# Patient Record
Sex: FEMALE | Race: BLACK OR AFRICAN AMERICAN | NOT HISPANIC OR LATINO | Employment: UNEMPLOYED | URBAN - METROPOLITAN AREA
[De-identification: names, ages, dates, MRNs, and addresses within clinical notes are randomized per-mention and may not be internally consistent; named-entity substitution may affect disease eponyms.]

---

## 2017-07-27 ENCOUNTER — OFFICE VISIT (OUTPATIENT)
Dept: OBSTETRICS AND GYNECOLOGY | Facility: CLINIC | Age: 27
End: 2017-07-27
Attending: OBSTETRICS & GYNECOLOGY
Payer: MEDICAID

## 2017-07-27 VITALS
BODY MASS INDEX: 24.52 KG/M2 | WEIGHT: 152.56 LBS | HEIGHT: 66 IN | DIASTOLIC BLOOD PRESSURE: 64 MMHG | SYSTOLIC BLOOD PRESSURE: 100 MMHG

## 2017-07-27 DIAGNOSIS — Z30.46 ENCOUNTER FOR SURVEILLANCE OF IMPLANTABLE SUBDERMAL CONTRACEPTIVE: ICD-10-CM

## 2017-07-27 DIAGNOSIS — Z01.411 ENCOUNTER FOR GYNECOLOGICAL EXAMINATION WITH ABNORMAL FINDING: Primary | ICD-10-CM

## 2017-07-27 DIAGNOSIS — Z30.9 ENCOUNTER FOR CONTRACEPTIVE MANAGEMENT, UNSPECIFIED TYPE: ICD-10-CM

## 2017-07-27 DIAGNOSIS — Z11.3 SCREEN FOR STD (SEXUALLY TRANSMITTED DISEASE): ICD-10-CM

## 2017-07-27 DIAGNOSIS — Z01.419 PAP SMEAR, AS PART OF ROUTINE GYNECOLOGICAL EXAMINATION: ICD-10-CM

## 2017-07-27 PROCEDURE — 99385 PREV VISIT NEW AGE 18-39: CPT | Mod: S$PBB,,, | Performed by: OBSTETRICS & GYNECOLOGY

## 2017-07-27 PROCEDURE — 88175 CYTOPATH C/V AUTO FLUID REDO: CPT

## 2017-07-27 PROCEDURE — 99999 PR PBB SHADOW E&M-EST. PATIENT-LVL III: CPT | Mod: PBBFAC,,, | Performed by: OBSTETRICS & GYNECOLOGY

## 2017-07-27 PROCEDURE — 87591 N.GONORRHOEAE DNA AMP PROB: CPT

## 2017-07-27 PROCEDURE — 87660 TRICHOMONAS VAGIN DIR PROBE: CPT

## 2017-07-27 PROCEDURE — 87480 CANDIDA DNA DIR PROBE: CPT

## 2017-07-27 PROCEDURE — 99213 OFFICE O/P EST LOW 20 MIN: CPT | Mod: PBBFAC | Performed by: OBSTETRICS & GYNECOLOGY

## 2017-07-27 NOTE — PROGRESS NOTES
SUBJECTIVE:   27 y.o. female   for annual routine Pap and checkup. Patient's last menstrual period was 2017..  She wants STD testing and interested in getting her nexplanon removed.  It has been in for 3 years. .        Past Medical History:   Diagnosis Date    Allergic      History reviewed. No pertinent surgical history.  Social History     Social History    Marital status: Single     Spouse name: N/A    Number of children: N/A    Years of education: N/A     Occupational History    Not on file.     Social History Main Topics    Smoking status: Never Smoker    Smokeless tobacco: Not on file    Alcohol use Yes      Comment: rarely    Drug use: No    Sexual activity: Yes     Other Topics Concern    Not on file     Social History Narrative    No narrative on file     Family History   Problem Relation Age of Onset    Breast cancer Neg Hx     Colon cancer Neg Hx     Ovarian cancer Neg Hx      OB History    Para Term  AB Living   0 0 0 0 0 0   SAB TAB Ectopic Multiple Live Births   0 0 0 0 0               No current outpatient prescriptions on file.     No current facility-administered medications for this visit.      Allergies: Review of patient's allergies indicates no known allergies.     ROS:  Constitutional: no weight loss, weight gain, fever, fatigue  Eyes:  No vision changes, glasses/contacts  ENT/Mouth: No ulcers, sinus problems, ears ringing, headache  Cardiovascular: No inability to lie flat, chest pain, exercise intolerance, swelling, heart palpitations  Respiratory: No wheezing, coughing blood, shortness of breath, or cough  Gastrointestinal: No diarrhea, bloody stool, nausea/vomiting, constipation, gas, hemorrhoids  Genitourinary: No blood in urine, painful urination, urgency of urination, frequency of urination, incomplete emptying, incontinence, abnormal bleeding, painful periods, heavy periods, vaginal discharge, vaginal odor, painful intercourse, sexual problems,  "bleeding after intercourse.  Musculoskeletal: No muscle weakness  Skin/Breast: No painful breasts, nipple discharge, masses, rash, ulcers  Neurological: No passing out, seizures, numbness, headache  Endocrine: No diabetes, hypothyroid, hyperthyroid, hot flashes, hair loss, abnormal hair growth, ance  Psychiatric: No depression, crying  Hematologic: No bruises, bleeding, swollen lymph nodes, anemia.      OBJECTIVE:   The patient appears well, alert, oriented x 3, in no distress.  /64   Ht 5' 6" (1.676 m)   Wt 69.2 kg (152 lb 8.9 oz)   LMP 07/21/2017   BMI 24.62 kg/m²   NECK: no thyromegaly, trachea midline  SKIN: no acne, striae, hirsutism  BREAST EXAM: breasts appear normal, no suspicious masses, no skin or nipple changes or axillary nodes  ABDOMEN: no hernias, masses, or hepatosplenomegaly  GENITALIA: normal external genitalia, no erythema, no discharge  URETHRA: normal appearing urethra with no masses, tenderness or lesions and normal urethra, normal urethral meatus  VAGINA: Normal  CERVIX: no lesions or cervical motion tenderness  UTERUS: normal size, contour, position, consistency, mobility, non-tender  ADNEXA: no mass, fullness, tenderness    \  ASSESSMENT:   Harish was seen today for well woman and contraception.    Diagnoses and all orders for this visit:    Encounter for gynecological examination with abnormal finding  -     Liquid-based pap smear, screening    Encounter for contraceptive management, unspecified type  -     POCT urine pregnancy    Pap smear, as part of routine gynecological examination  -     Liquid-based pap smear, screening    Encounter for surveillance of implantable subdermal contraceptive  Will schedule removal.  Screen for STD (sexually transmitted disease)  -     C. trachomatis/N. gonorrhoeae by AMP DNA Cervix  -     Vaginosis Screen by DNA Probe        "

## 2017-07-28 LAB
CANDIDA RRNA VAG QL PROBE: NEGATIVE
G VAGINALIS RRNA GENITAL QL PROBE: POSITIVE
T VAGINALIS RRNA GENITAL QL PROBE: NEGATIVE

## 2017-07-30 LAB
C TRACH DNA SPEC QL NAA+PROBE: NOT DETECTED
N GONORRHOEA DNA SPEC QL NAA+PROBE: NOT DETECTED

## 2017-07-31 ENCOUNTER — TELEPHONE (OUTPATIENT)
Dept: OBSTETRICS AND GYNECOLOGY | Facility: HOSPITAL | Age: 27
End: 2017-07-31

## 2017-07-31 DIAGNOSIS — N76.0 BV (BACTERIAL VAGINOSIS): Primary | ICD-10-CM

## 2017-07-31 DIAGNOSIS — B96.89 BV (BACTERIAL VAGINOSIS): Primary | ICD-10-CM

## 2017-07-31 RX ORDER — METRONIDAZOLE 500 MG/1
500 TABLET ORAL EVERY 12 HOURS
Qty: 14 TABLET | Refills: 0 | Status: SHIPPED | OUTPATIENT
Start: 2017-07-31 | End: 2017-08-07

## 2017-07-31 NOTE — TELEPHONE ENCOUNTER
Note abnormal affirm.  Positive for bacterial vaginosis.  Rx sent to pharmacy on file.    Please call pt to notify her of the results.

## 2017-08-03 ENCOUNTER — TELEPHONE (OUTPATIENT)
Dept: OBSTETRICS AND GYNECOLOGY | Facility: CLINIC | Age: 27
End: 2017-08-03

## 2017-08-03 NOTE — TELEPHONE ENCOUNTER
----- Message from Ct Tyler sent at 8/3/2017  8:46 AM CDT -----  Contact: self  Patient is wanting to reschedule her nexplanon removal, patient has medicaid, please advise on this request. Thank you! Patient can be reached at [phone]

## 2017-08-03 NOTE — TELEPHONE ENCOUNTER
Patient was rescheduled for nexplanon removal for Tues 8/15 at the [facility].

## 2017-08-15 ENCOUNTER — PROCEDURE VISIT (OUTPATIENT)
Dept: OBSTETRICS AND GYNECOLOGY | Facility: CLINIC | Age: 27
End: 2017-08-15
Attending: OBSTETRICS & GYNECOLOGY
Payer: MEDICAID

## 2017-08-15 VITALS
WEIGHT: 153.88 LBS | DIASTOLIC BLOOD PRESSURE: 60 MMHG | BODY MASS INDEX: 24.73 KG/M2 | SYSTOLIC BLOOD PRESSURE: 100 MMHG | HEIGHT: 66 IN

## 2017-08-15 DIAGNOSIS — Z30.46 ENCOUNTER FOR NEXPLANON REMOVAL: Primary | ICD-10-CM

## 2017-08-15 PROCEDURE — 11982 REMOVE DRUG IMPLANT DEVICE: CPT | Mod: PBBFAC,PO | Performed by: OBSTETRICS & GYNECOLOGY

## 2017-08-15 NOTE — PROCEDURES
Removal of Nexplanon Device  Date/Time: 8/15/2017 9:23 AM  Performed by: HANNAH DEL CID  Authorized by: HANNAH DEL CID   Local anesthesia used: yes  Anesthesia: local infiltration    Anesthesia:  Local anesthesia used: yes  Local Anesthetic: lidocaine 1% without epinephrine  Patient tolerance: Patient tolerated the procedure well with no immediate complications      Ken Bills is a 27 y.o. female  presents for Nexplanon removal.    UPT negative.    Nexplanon palpated through the skin.  Area wiped with rubbing alcohol.  1 cc of 1% lidocaine without epinephrine was injected in the subcutaneous tissue.  The area was prepped with betadine.  A stabbing incision was made with an 11 blade scalpel.  The Nexplanon was identified and grasped with curved hemostat.  It was removed intact.  A bandage was placed over the incision site. Pt tolerated the procedure well.       Hannah Del Cid MD  PGY-4 OB/GYN  [phone]

## 2018-01-14 ENCOUNTER — HOSPITAL ENCOUNTER (EMERGENCY)
Facility: OTHER | Age: 28
Discharge: HOME | End: 2018-01-14
Attending: EMERGENCY MEDICINE
Payer: MEDICAID

## 2018-01-14 VITALS
RESPIRATION RATE: 18 BRPM | BODY MASS INDEX: 25.71 KG/M2 | SYSTOLIC BLOOD PRESSURE: 117 MMHG | HEART RATE: 76 BPM | HEIGHT: 66 IN | WEIGHT: 160 LBS | OXYGEN SATURATION: 99 % | TEMPERATURE: 99 F | DIASTOLIC BLOOD PRESSURE: 59 MMHG

## 2018-01-14 DIAGNOSIS — Z34.90 PREGNANCY AT EARLY STAGE: Primary | ICD-10-CM

## 2018-01-14 LAB
B-HCG UR QL: POSITIVE
BILIRUB UR QL STRIP: NEGATIVE
CLARITY UR: CLEAR
COLOR UR: YELLOW
CTP QC/QA: YES
GLUCOSE UR QL STRIP: NEGATIVE
HGB UR QL STRIP: NEGATIVE
KETONES UR QL STRIP: NEGATIVE
LEUKOCYTE ESTERASE UR QL STRIP: NEGATIVE
NITRITE UR QL STRIP: NEGATIVE
PH UR STRIP: 6 [PH] (ref 5–8)
PROT UR QL STRIP: NEGATIVE
SP GR UR STRIP: 1.02 (ref 1–1.03)
URN SPEC COLLECT METH UR: NORMAL
UROBILINOGEN UR STRIP-ACNC: 1 EU/DL

## 2018-01-14 PROCEDURE — 81025 URINE PREGNANCY TEST: CPT | Performed by: EMERGENCY MEDICINE

## 2018-01-14 PROCEDURE — 81003 URINALYSIS AUTO W/O SCOPE: CPT

## 2018-01-14 PROCEDURE — 99283 EMERGENCY DEPT VISIT LOW MDM: CPT | Mod: 25

## 2018-01-14 PROCEDURE — 25000003 PHARM REV CODE 250: Performed by: PHYSICIAN ASSISTANT

## 2018-01-14 RX ORDER — ACETAMINOPHEN 500 MG
1000 TABLET ORAL
Status: COMPLETED | OUTPATIENT
Start: 2018-01-14 | End: 2018-01-14

## 2018-01-14 RX ADMIN — ACETAMINOPHEN 1000 MG: 500 TABLET ORAL at 06:01

## 2018-01-15 NOTE — ED NOTES
Appearance: Pt awake, alert & oriented to person, place & time. Pt in no acute distress at present time. Pt is clean and well groomed with clothes appropriately fastened.   Skin: Skin warm, dry & intact. Color consistent with ethnicity. Mucous membranes moist. No breakdown or brusing noted.   Musculoskeletal: Patient moving all extremities well, no obvious swelling or deformities noted.   Respiratory: Respirations spontaneous, even, and non-labored. Visible chest rise noted. Airway is open and patent. No accessory muscle use noted.   Neurologic: Sensation is intact. Speech is clear and appropriate. Eyes open spontaneously, behavior appropriate to situation, follows commands,  Cardiac: Pt denies active chest pains, SOB, dizziness, blurred vision, weakness or fatigue at this time.   Abdomen: Abdomen soft, non distended. SEE HPI.   : Pt reports no dysuria or hematuria.

## 2018-01-15 NOTE — ED PROVIDER NOTES
Encounter Date: 2018       History     Chief Complaint   Patient presents with    Possible Pregnancy     took a home pregnancy test and it showed positive and she is looking for confirmation and states that for the last few days her stomach has been hurting her     Patient is a 27-year-old  female with no significant past medical history who presents to the ED with possible pregnancy.  Patient states the past week she has had mild, intermittent upper abdominal cramping for the past week.  She denies vaginal bleeding or vaginal discharge.  She denies nausea or vomiting.  She denies dysuria or hematuria.  She states she took a pregnancy test at home today and was positive.  She states she wants us to confirm that she is pregnant.       The history is provided by the patient.     Review of patient's allergies indicates:  No Known Allergies  Past Medical History:   Diagnosis Date    Allergic      Past Surgical History:   Procedure Laterality Date    NO PAST SURGERIES       Family History   Problem Relation Age of Onset    Breast cancer Neg Hx     Colon cancer Neg Hx     Ovarian cancer Neg Hx      Social History   Substance Use Topics    Smoking status: Never Smoker    Smokeless tobacco: Never Used    Alcohol use Yes      Comment: rarely     Review of Systems   Constitutional: Negative for chills and fever.   HENT: Negative for congestion and sore throat.    Eyes: Negative for pain.   Respiratory: Negative for shortness of breath.    Cardiovascular: Negative for chest pain.   Gastrointestinal: Positive for abdominal pain. Negative for diarrhea, nausea and vomiting.   Genitourinary: Negative for dysuria, hematuria, pelvic pain, vaginal bleeding and vaginal discharge.   Musculoskeletal: Negative for arthralgias.   Skin: Negative for rash.   Neurological: Negative for headaches.       Physical Exam     Initial Vitals [18 1618]   BP Pulse Resp Temp SpO2   (!) 115/55 75 16 98.3 °F (36.8 °C) 100 %       MAP       75         Physical Exam    Constitutional: Vital signs are normal. She is cooperative.   -American female in no acute distress.  She is nontoxic-appearing.   HENT:   Head: Normocephalic and atraumatic.   Mouth/Throat: Oropharynx is clear and moist.   Eyes: Conjunctivae and EOM are normal. Pupils are equal, round, and reactive to light.   Neck: Normal range of motion. Neck supple.   Cardiovascular: Normal rate, regular rhythm and intact distal pulses.   No murmur heard.  Pulmonary/Chest: Breath sounds normal. She has no wheezes. She has no rhonchi. She has no rales.   Abdominal: Soft. Bowel sounds are normal. There is no tenderness. There is no rebound and no guarding.   Musculoskeletal: Normal range of motion. She exhibits no edema.   Neurological: She is alert and oriented to person, place, and time. She has normal strength. GCS eye subscore is 4. GCS verbal subscore is 5. GCS motor subscore is 6.   Skin: Skin is warm and dry. Capillary refill takes less than 2 seconds. No rash noted.   Psychiatric: Her behavior is normal.         ED Course   Procedures  Labs Reviewed   POCT URINE PREGNANCY - Abnormal; Notable for the following:        Result Value    POC Preg Test, Ur Positive (*)     All other components within normal limits   URINALYSIS             Medical Decision Making:   Initial Assessment:   Urgent evaluation of a 27 y.o. female presenting to the emergency department complaining of possible pregnancy. Patient is afebrile, nontoxic appearing and hemodynamically stable.  ED Management:  Patient with positive home UPT.  UPT is positive here.  Exam is unremarkable.  No signs of acute abdomen.  Urinalysis reveals no UTI.  No vaginal bleeding.  I do not believe further workup is indicated at this time.  Patient will be sent home with prenatal vitamins and given referral to [facility] clinic.  I have given her specific return precautions for new or worsening symptoms. I have discussed the  patient with the attending thoroughly and he/she agrees to the treatment and plan.   Other:   I have discussed this case with another health care provider.  This note was created using Dragon Medical Dictation. There may be typographical errors secondary to dictation.                    ED Course      Clinical Impression:     1. Pregnancy at early stage                             Gino Tadeo PA-C  01/14/18 1928

## 2018-01-15 NOTE — ED NOTES
Pt presenting to ED with reporting she had positive home pregnancy today, wanted to come to ED confirm her pregnancy. Pt with generalized abdominal pain. Denies nausea, vomiting, fever or chills. Reports her last LMP was on December 13th of 2017. No other complaints. Pt AAOx4 and appropriate at this time. Respirations even and unlabored. No acute distress noted.

## 2018-01-22 ENCOUNTER — OFFICE VISIT (OUTPATIENT)
Dept: OBSTETRICS AND GYNECOLOGY | Facility: CLINIC | Age: 28
End: 2018-01-22
Payer: MEDICAID

## 2018-01-22 VITALS
SYSTOLIC BLOOD PRESSURE: 110 MMHG | DIASTOLIC BLOOD PRESSURE: 66 MMHG | HEIGHT: 66 IN | WEIGHT: 150.81 LBS | BODY MASS INDEX: 24.24 KG/M2

## 2018-01-22 DIAGNOSIS — Z32.01 POSITIVE PREGNANCY TEST: Primary | ICD-10-CM

## 2018-01-22 DIAGNOSIS — N91.2 AMENORRHEA: ICD-10-CM

## 2018-01-22 LAB
B-HCG UR QL: POSITIVE
C TRACH DNA SPEC QL NAA+PROBE: NOT DETECTED
CTP QC/QA: YES
N GONORRHOEA DNA SPEC QL NAA+PROBE: NOT DETECTED

## 2018-01-22 PROCEDURE — 99213 OFFICE O/P EST LOW 20 MIN: CPT | Mod: TH,S$PBB,, | Performed by: STUDENT IN AN ORGANIZED HEALTH CARE EDUCATION/TRAINING PROGRAM

## 2018-01-22 PROCEDURE — 99999 PR PBB SHADOW E&M-EST. PATIENT-LVL III: CPT | Mod: PBBFAC,,, | Performed by: STUDENT IN AN ORGANIZED HEALTH CARE EDUCATION/TRAINING PROGRAM

## 2018-01-22 PROCEDURE — 87086 URINE CULTURE/COLONY COUNT: CPT

## 2018-01-22 PROCEDURE — 81025 URINE PREGNANCY TEST: CPT | Mod: PBBFAC,PO | Performed by: STUDENT IN AN ORGANIZED HEALTH CARE EDUCATION/TRAINING PROGRAM

## 2018-01-22 PROCEDURE — 99213 OFFICE O/P EST LOW 20 MIN: CPT | Mod: PBBFAC,TH,PO | Performed by: STUDENT IN AN ORGANIZED HEALTH CARE EDUCATION/TRAINING PROGRAM

## 2018-01-22 PROCEDURE — 87491 CHLMYD TRACH DNA AMP PROBE: CPT

## 2018-01-22 NOTE — PROGRESS NOTES
"CC: Absence of menses    Ken Bills is a 27 y.o. female  presents with complaint of absence of menstruation.  UPT is positive.   She complains of multiple symptoms including abdominal pain (patient cannot characterize but says it is not cramping). She also has nausea, vomiting (x 1 two days ago, has not had since), headaches, and dizziness. She has not fallen and denies syncope or seizures. She denies fevers, chills, or diarrhea. She denies vaginal bleeding.    Past Medical History:   Diagnosis Date    Allergic      Past Surgical History:   Procedure Laterality Date    NO PAST SURGERIES       Social History     Social History    Marital status: Single     Spouse name: N/A    Number of children: N/A    Years of education: N/A     Social History Main Topics    Smoking status: Never Smoker    Smokeless tobacco: Never Used    Alcohol use No      Comment: rarely    Drug use: No    Sexual activity: Yes     Partners: Male     Birth control/ protection: None     Other Topics Concern    None     Social History Narrative    None     Family History   Problem Relation Age of Onset    Breast cancer Neg Hx     Colon cancer Neg Hx     Ovarian cancer Neg Hx      OB History    Para Term  AB Living   1 0 0 0 0 0   SAB TAB Ectopic Multiple Live Births   0 0 0 0 0      # Outcome Date GA Lbr Randell/2nd Weight Sex Delivery Anes PTL Lv   1 Current                   /66   Ht 5' 6" (1.676 m)   Wt 68.4 kg (150 lb 12.7 oz)   LMP 2017 (Approximate)   BMI 24.34 kg/m²     ROS:  GENERAL: See HPI.  SKIN: Denies rash or lesions.   HEAD: Denies head injury or headache.   NODES: Denies enlarged lymph nodes.   CHEST: Denies chest pain or shortness of breath.   CARDIOVASCULAR: Denies palpitations or left sided chest pain.   ABDOMEN: No constipation, diarrhea, or rectal bleeding. + nausea, vomiting, abdominal pain.   URINARY: No frequency, dysuria, hematuria, or burning on " urination.  REPRODUCTIVE: See HPI.   BREASTS: The patient performs breast self-examination and denies pain, lumps, or nipple discharge.   HEMATOLOGIC: No easy bruisability or excessive bleeding.   MUSCULOSKELETAL: Denies joint pain or swelling.   NEUROLOGIC: Denies syncope or weakness.   PSYCHIATRIC: Denies depression, anxiety or mood swings.    PE:   APPEARANCE: Well nourished, well developed, in no acute distress.  AFFECT: WNL, alert and oriented x 3.  SKIN: No acne or hirsutism.  NECK: Neck symmetric without masses or thyromegaly.  CHEST: Good respiratory effort.   ABDOMEN: Soft. No tenderness or masses. No hepatosplenomegaly. No hernias.  BREASTS: Symmetrical, no skin changes or visible lesions. No palpable masses, nipple discharge bilaterally.  PELVIC: Normal external female genitalia without lesions. Normal hair distribution. Adequate perineal body, normal urethral meatus. Vagina moist and well rugated without lesions or discharge. Cervix pink, without lesions, discharge or tenderness. No significant cystocele or rectocele. Bimanual exam shows uterus is 8 weeks, regular, mobile and nontender. Adnexa without masses or tenderness.  EXTREMITIES: No edema.          ASSESSMENT and PLAN:    ICD-10-CM ICD-9-CM    1. Positive pregnancy test Z32.01 V72.42 RPR      HIV-1 and HIV-2 antibodies      Hemoglobin Electrophoresis,Hgb A2 [place].      Hepatitis B surface antigen      Type & Screen - Ob Profile      Rubella antibody, IgG      Urine culture      C. trachomatis/N. gonorrhoeae by AMP DNA Cervicovaginal      CBC auto differential      US OB/GYN Procedure ([facility])      CANCELED: Liquid-based pap smear, screening   2. Amenorrhea N91.2 626.0 POCT urine pregnancy          Patient was counseled today on proper weight gain based on the [facility]'s recommendations based on her pre-pregnancy weight. Discussed foods to avoid in pregnancy (i.e. sushi, fish that are high in mercury, deli meat, and unpasteurized  cheeses). Discussed prenatal vitamin options (i.e. stool softener, DHA).  - first trimester labs ordered  - SPADD  - ultrasound ordered  - counseled to use carlos and/or B6/doxylamine prn for nausea. Will have her return to clinic in 2 weeks to assess symptoms and see if patient is losing weight.  - ask about contingency screening and flu shot at that time    Follow-up in about 2 weeks (around 2/5/2018).     I have evaluated the patient and agree with the resident's diagnosis and management plan.

## 2018-01-23 LAB — BACTERIA UR CULT: NORMAL

## 2018-02-05 ENCOUNTER — INITIAL PRENATAL (OUTPATIENT)
Dept: OBSTETRICS AND GYNECOLOGY | Facility: CLINIC | Age: 28
End: 2018-02-05
Payer: MEDICAID

## 2018-02-05 VITALS
WEIGHT: 148.56 LBS | BODY MASS INDEX: 23.98 KG/M2 | DIASTOLIC BLOOD PRESSURE: 78 MMHG | SYSTOLIC BLOOD PRESSURE: 118 MMHG

## 2018-02-05 DIAGNOSIS — O21.9 NAUSEA AND VOMITING IN PREGNANCY: ICD-10-CM

## 2018-02-05 DIAGNOSIS — Z32.01 POSITIVE PREGNANCY TEST: ICD-10-CM

## 2018-02-05 DIAGNOSIS — O21.9 NAUSEA/VOMITING IN PREGNANCY: Primary | ICD-10-CM

## 2018-02-05 PROCEDURE — 3008F BODY MASS INDEX DOCD: CPT | Mod: ,,, | Performed by: STUDENT IN AN ORGANIZED HEALTH CARE EDUCATION/TRAINING PROGRAM

## 2018-02-05 PROCEDURE — 99999 PR PBB SHADOW E&M-EST. PATIENT-LVL II: CPT | Mod: PBBFAC,,, | Performed by: STUDENT IN AN ORGANIZED HEALTH CARE EDUCATION/TRAINING PROGRAM

## 2018-02-05 PROCEDURE — 99212 OFFICE O/P EST SF 10 MIN: CPT | Mod: PBBFAC,TH,PO | Performed by: STUDENT IN AN ORGANIZED HEALTH CARE EDUCATION/TRAINING PROGRAM

## 2018-02-05 PROCEDURE — 99213 OFFICE O/P EST LOW 20 MIN: CPT | Mod: TH,S$PBB,, | Performed by: STUDENT IN AN ORGANIZED HEALTH CARE EDUCATION/TRAINING PROGRAM

## 2018-02-05 RX ORDER — ONDANSETRON 4 MG/1
4 TABLET, ORALLY DISINTEGRATING ORAL EVERY 8 HOURS PRN
Qty: 60 TABLET | Refills: 3 | Status: ON HOLD | OUTPATIENT
Start: 2018-02-05 | End: 2018-09-14 | Stop reason: HOSPADM

## 2018-02-05 NOTE — PROGRESS NOTES
Complaints today: Patient is here today for follow up of nausea and vomiting. She states she vomits infrequently and mainly has nausea causing decreased appetite. She is generally able to keep food down (she had ribs for dinner last night, banana for breakfast this morning) but has vomited with some foods (had emesis following ravioli for lunch yesterday). She has been drinking plenty of fluids. She did not try the ginger as she did not know what kind of ginger to take and did not try doxylamine as she was told at the pharmacy that it was a sleep aid. She is otherwise asymptomatic and has no complaints.    /78   Wt 67.4 kg (148 lb 9.4 oz)   LMP 2017 (Approximate)   BMI 23.98 kg/m²     27 y.o., at Unknown by Estimated Date of Delivery: None noted.  There is no problem list on file for this patient.    OB History    Para Term  AB Living   1 0 0 0 0 0   SAB TAB Ectopic Multiple Live Births   0 0 0 0 0      # Outcome Date GA Lbr Randell/2nd Weight Sex Delivery Anes PTL Lv   1 Current                   Dating reviewed    Allergies and problem list reviewed and updated    Medical and surgical history reviewed    Prenatal labs reviewed and updated    Physical Exam:  ABD: soft, gravid, nontender    Assessment:  Ken was seen today for initial prenatal visit.    Diagnoses and all orders for this visit:    Nausea/vomiting in pregnancy  -     ondansetron (ZOFRAN-ODT) 4 MG TbDL; Take 1 tablet (4 mg total) by mouth every 8 (eight) hours as needed.    Positive pregnancy test  -     ondansetron (ZOFRAN-ODT) 4 MG TbDL; Take 1 tablet (4 mg total) by mouth every 8 (eight) hours as needed.  -     Influenza - Trivalent (3 years & older) (PF); Future    Other orders  -     prenatal 21-iron fu-folic acid (PRENATAL COMPLETE) 14 mg iron- 400 mcg Tab; Take 1 tablet by mouth once daily.      Plan:   - Rx for PNV refilled  - patient has lost 2 lbs since two weeks ago  - discussed that ginger can be taken as tea, in  ginger ale, in capsule form, or in candy form, as long as it is real ginger.  - prescription given for zofran 4mg odt q8prn.   - flu vaccine ordered  - desires contingency screening  - follow up 2 weeks for next prenatal visit. Will reassess n/v at that time. Discussed that she should call sooner if unable to tolerate food or water by mouth. Patient voiced understanding and agreement.

## 2018-02-08 ENCOUNTER — LAB VISIT (OUTPATIENT)
Dept: LAB | Facility: OTHER | Age: 28
End: 2018-02-08
Payer: MEDICAID

## 2018-02-08 ENCOUNTER — PROCEDURE VISIT (OUTPATIENT)
Dept: OBSTETRICS AND GYNECOLOGY | Facility: CLINIC | Age: 28
End: 2018-02-08
Payer: MEDICAID

## 2018-02-08 ENCOUNTER — CLINICAL SUPPORT (OUTPATIENT)
Dept: OBSTETRICS AND GYNECOLOGY | Facility: CLINIC | Age: 28
End: 2018-02-08
Payer: MEDICAID

## 2018-02-08 DIAGNOSIS — N91.1 SECONDARY AMENORRHEA: ICD-10-CM

## 2018-02-08 DIAGNOSIS — Z32.01 POSITIVE PREGNANCY TEST: ICD-10-CM

## 2018-02-08 DIAGNOSIS — Z23 NEEDS FLU SHOT: Primary | ICD-10-CM

## 2018-02-08 DIAGNOSIS — Z36.89 ESTABLISH GESTATIONAL AGE, ULTRASOUND: ICD-10-CM

## 2018-02-08 LAB
ABO + RH BLD: NORMAL
BASOPHILS # BLD AUTO: 0.01 K/UL
BASOPHILS NFR BLD: 0.1 %
BLD GP AB SCN CELLS X3 SERPL QL: NORMAL
DIFFERENTIAL METHOD: NORMAL
EOSINOPHIL # BLD AUTO: 0.1 K/UL
EOSINOPHIL NFR BLD: 1.1 %
ERYTHROCYTE [DISTWIDTH] IN BLOOD BY AUTOMATED COUNT: 13 %
HCT VFR BLD AUTO: 37.3 %
HGB BLD-MCNC: 12.5 G/DL
LYMPHOCYTES # BLD AUTO: 1.6 K/UL
LYMPHOCYTES NFR BLD: 19.4 %
MCH RBC QN AUTO: 29.5 PG
MCHC RBC AUTO-ENTMCNC: 33.5 G/DL
MCV RBC AUTO: 88 FL
MONOCYTES # BLD AUTO: 0.7 K/UL
MONOCYTES NFR BLD: 8.8 %
NEUTROPHILS # BLD AUTO: 5.6 K/UL
NEUTROPHILS NFR BLD: 70.1 %
PLATELET # BLD AUTO: 255 K/UL
PMV BLD AUTO: 9.6 FL
RBC # BLD AUTO: 4.24 M/UL
RPR SER QL: NORMAL
WBC # BLD AUTO: 8 K/UL

## 2018-02-08 PROCEDURE — 86762 RUBELLA ANTIBODY: CPT

## 2018-02-08 PROCEDURE — 76817 TRANSVAGINAL US OBSTETRIC: CPT | Mod: 26,S$PBB,, | Performed by: OBSTETRICS & GYNECOLOGY

## 2018-02-08 PROCEDURE — 99211 OFF/OP EST MAY X REQ PHY/QHP: CPT | Mod: PBBFAC,25,TH

## 2018-02-08 PROCEDURE — 83020 HEMOGLOBIN ELECTROPHORESIS: CPT

## 2018-02-08 PROCEDURE — 76817 TRANSVAGINAL US OBSTETRIC: CPT | Mod: PBBFAC | Performed by: OBSTETRICS & GYNECOLOGY

## 2018-02-08 PROCEDURE — 99999 PR PBB SHADOW E&M-EST. PATIENT-LVL I: CPT | Mod: PBBFAC,,,

## 2018-02-08 PROCEDURE — 90686 IIV4 VACC NO PRSV 0.5 ML IM: CPT | Mod: PBBFAC

## 2018-02-08 PROCEDURE — 86703 HIV-1/HIV-2 1 RESULT ANTBDY: CPT

## 2018-02-08 PROCEDURE — 86850 RBC ANTIBODY SCREEN: CPT

## 2018-02-08 PROCEDURE — 85025 COMPLETE CBC W/AUTO DIFF WBC: CPT

## 2018-02-08 PROCEDURE — 87340 HEPATITIS B SURFACE AG IA: CPT

## 2018-02-08 PROCEDURE — 86592 SYPHILIS TEST NON-TREP QUAL: CPT

## 2018-02-08 PROCEDURE — 36415 COLL VENOUS BLD VENIPUNCTURE: CPT

## 2018-02-08 NOTE — PROGRESS NOTES
Here for Influenza - Quadrivalent - PF (ADULT) vaccine . Vaccine Information sheet given to patient. Patient without complaint of pain prior to or after injection. Immunization tolerated well and patient advised to wait in lobby 15 minutes. Report any adverse reactions.      Site: LD

## 2018-02-09 LAB
HBV SURFACE AG SERPL QL IA: NEGATIVE
HIV 1+2 AB+HIV1 P24 AG SERPL QL IA: NEGATIVE
RUBV IGG SER-ACNC: 16.9 IU/ML
RUBV IGG SER-IMP: REACTIVE

## 2018-02-12 LAB
HGB A2 MFR BLD HPLC: 2.7 %
HGB FRACT BLD ELPH-IMP: NORMAL
HGB FRACT BLD ELPH-IMP: NORMAL

## 2018-02-19 ENCOUNTER — ROUTINE PRENATAL (OUTPATIENT)
Dept: OBSTETRICS AND GYNECOLOGY | Facility: CLINIC | Age: 28
End: 2018-02-19
Payer: MEDICAID

## 2018-02-19 VITALS
DIASTOLIC BLOOD PRESSURE: 74 MMHG | WEIGHT: 152.56 LBS | SYSTOLIC BLOOD PRESSURE: 114 MMHG | BODY MASS INDEX: 24.62 KG/M2

## 2018-02-19 DIAGNOSIS — Z34.02 ENCOUNTER FOR SUPERVISION OF NORMAL FIRST PREGNANCY IN SECOND TRIMESTER: ICD-10-CM

## 2018-02-19 PROBLEM — Z34.00 PREGNANCY, SUPERVISION, NORMAL, FIRST: Status: ACTIVE | Noted: 2018-02-19

## 2018-02-19 PROCEDURE — 99999 PR PBB SHADOW E&M-EST. PATIENT-LVL II: CPT | Mod: PBBFAC,,, | Performed by: STUDENT IN AN ORGANIZED HEALTH CARE EDUCATION/TRAINING PROGRAM

## 2018-02-19 PROCEDURE — 99213 OFFICE O/P EST LOW 20 MIN: CPT | Mod: TH,S$PBB,, | Performed by: STUDENT IN AN ORGANIZED HEALTH CARE EDUCATION/TRAINING PROGRAM

## 2018-02-19 PROCEDURE — 99212 OFFICE O/P EST SF 10 MIN: CPT | Mod: PBBFAC,TH,PO | Performed by: STUDENT IN AN ORGANIZED HEALTH CARE EDUCATION/TRAINING PROGRAM

## 2018-02-19 PROCEDURE — 3008F BODY MASS INDEX DOCD: CPT | Mod: ,,, | Performed by: STUDENT IN AN ORGANIZED HEALTH CARE EDUCATION/TRAINING PROGRAM

## 2018-02-19 NOTE — PROGRESS NOTES
Doing well, no questions,no problems.  I have reviewed the resident's note, evaluated the patient and agree with the diagnosis and management plan

## 2018-02-19 NOTE — PROGRESS NOTES
Complaints today: Denies vaginal bleeding, contractions, loss of fluid. Nausea/vomiting from last visit has resolved. Taking daily prenatal vitamins. Has already received her flu shot. Interested in genetic screening.      /74   Wt 69.2 kg (152 lb 8.9 oz)   LMP 2017 (Approximate)   BMI 24.62 kg/m²     27 y.o., at 11w5d by Estimated Date of Delivery: 18  Patient Active Problem List   Diagnosis    Nausea and vomiting in pregnancy    Pregnancy, supervision, normal, first     OB History    Para Term  AB Living   1 0 0 0 0 0   SAB TAB Ectopic Multiple Live Births   0 0 0 0 0      # Outcome Date GA Lbr Randell/2nd Weight Sex Delivery Anes PTL Lv   1 Current                   Dating reviewed    Allergies and problem list reviewed and updated    Medical and surgical history reviewed    Prenatal labs reviewed and updated    Physical Exam:  ABD: soft, gravid, nontender,     Assessment:  Ken was seen today for routine prenatal visit.    Diagnoses and all orders for this visit:    Encounter for supervision of normal first pregnancy in second trimester  -     US MFM Procedure ([facility]); Future          Plan:   - NT ordered  - s/p flu shot  - will need anatomy scan ordered next visit   follow up 4 Weeks, bleeding/pain precautions given

## 2018-02-26 ENCOUNTER — TELEPHONE (OUTPATIENT)
Dept: OBSTETRICS AND GYNECOLOGY | Facility: CLINIC | Age: 28
End: 2018-02-26

## 2018-02-26 ENCOUNTER — OFFICE VISIT (OUTPATIENT)
Dept: MATERNAL FETAL MEDICINE | Facility: CLINIC | Age: 28
End: 2018-02-26
Payer: MEDICAID

## 2018-02-26 ENCOUNTER — LAB VISIT (OUTPATIENT)
Dept: LAB | Facility: OTHER | Age: 28
End: 2018-02-26
Attending: OBSTETRICS & GYNECOLOGY
Payer: MEDICAID

## 2018-02-26 VITALS — BODY MASS INDEX: 25.34 KG/M2 | WEIGHT: 156.94 LBS

## 2018-02-26 DIAGNOSIS — Z36.82 ENCOUNTER FOR (NT) NUCHAL TRANSLUCENCY SCAN: ICD-10-CM

## 2018-02-26 DIAGNOSIS — Z34.02 ENCOUNTER FOR SUPERVISION OF NORMAL FIRST PREGNANCY IN SECOND TRIMESTER: ICD-10-CM

## 2018-02-26 DIAGNOSIS — Z36.89 ENCOUNTER FOR FETAL ANATOMIC SURVEY: Primary | ICD-10-CM

## 2018-02-26 PROCEDURE — 36415 COLL VENOUS BLD VENIPUNCTURE: CPT

## 2018-02-26 PROCEDURE — 81508 FTL CGEN ABNOR TWO PROTEINS: CPT

## 2018-02-26 PROCEDURE — 99499 UNLISTED E&M SERVICE: CPT | Mod: S$PBB,,, | Performed by: OBSTETRICS & GYNECOLOGY

## 2018-02-26 PROCEDURE — 76813 OB US NUCHAL MEAS 1 GEST: CPT | Mod: PBBFAC | Performed by: OBSTETRICS & GYNECOLOGY

## 2018-02-26 PROCEDURE — 76813 OB US NUCHAL MEAS 1 GEST: CPT | Mod: 26,S$PBB,, | Performed by: OBSTETRICS & GYNECOLOGY

## 2018-02-26 PROCEDURE — 99999 PR PBB SHADOW E&M-EST. PATIENT-LVL I: CPT | Mod: PBBFAC,,,

## 2018-02-26 PROCEDURE — 99211 OFF/OP EST MAY X REQ PHY/QHP: CPT | Mod: PBBFAC,TH

## 2018-02-26 NOTE — TELEPHONE ENCOUNTER
----- Message from Svetlana Mcallister sent at 2/26/2018  9:08 AM CST -----  12wks ob pt would like to know can she take orajel. Pt can be reached at [phone].

## 2018-02-26 NOTE — PROGRESS NOTES
OB Ultrasound Report (see PDF version under imaging tab)    Indication  ========    First trimester screening.    Method  ======    Transabdominal ultrasound examination. View: Good view.    Pregnancy  =========    [place] pregnancy. Number of fetuses: 1.    Dating  ======    Ultrasound examination on: 2/26/2018  GA by U/S based upon: CRL  GA by U/S 12 w + 6 d  DANIEL by U/S: 9/4/2018  Assigned: Dating performed on 02/8/2018, based on ultrasound (CRL)  Assigned GA 12 w + 5 d  Assigned DANIEL: 9/5/2018    General Evaluation  ===============    Cardiac activity: present.  Placenta: posterior.    Fetal Biometry  ===========    CRL 65.0 mm 12w 6d [place]  NT 1.4 mm   bpm    Fetal Anatomy  ===========    The following structures appear normal:  Cranium. GI tract.    The following structures were visualized:  Arms. Legs.    Impression  =========    Fetal size is consistent with established dating, and normal fetal heart motion is seen. The nuchal translucency is measured, and a  sample of maternal blood will be sent today for the first portion of the integrated screen.    Recommendation  ==============    First portion of sequential screening completed today. Results to be sent to primary pregnancy care provider. Recommend to complete  second blood draw beyond 15 weeks EGA of pregnancy. Ultrasound for fetal anatomical survey scheduled by [facility] today for 19-20  weeks EGA.

## 2018-02-28 LAB
# FETUSES US: NORMAL
AGE AT DELIVERY: 28
B-HCG MOM SERPL: NORMAL
B-HCG SERPL-ACNC: 61.3 IU/ML
FET CRL US.MEAS: 65 MM
FET NASAL BONE LENGTH US.MEAS: NORMAL MM
FET NUCHAL FOLD MOM THICKNESS US.MEAS: NORMAL
FET NUCHAL FOLD THICKNESS US.MEAS: 1.4 MM
FET TS 21 RISK FROM MAT AGE: NORMAL
GA (DAYS): 6 D
GA (WEEKS): 12 WK
IDDM PATIENT QL: NORMAL
INTEGRATED SCN PATIENT-IMP: NEGATIVE
PAPP-A MOM SERPL: NORMAL
PAPP-A SERPL-MCNC: 869.4 NG/ML
SEQUENTIAL SCREEN I INTERP.: NORMAL
SMOKING STATUS FTND: NO
TS 18 RISK FETUS: NORMAL
TS 21 RISK FETUS: NORMAL
US DATE: NORMAL

## 2018-03-19 ENCOUNTER — ROUTINE PRENATAL (OUTPATIENT)
Dept: OBSTETRICS AND GYNECOLOGY | Facility: CLINIC | Age: 28
End: 2018-03-19
Payer: MEDICAID

## 2018-03-19 VITALS
DIASTOLIC BLOOD PRESSURE: 70 MMHG | SYSTOLIC BLOOD PRESSURE: 120 MMHG | BODY MASS INDEX: 25.16 KG/M2 | WEIGHT: 155.88 LBS

## 2018-03-19 DIAGNOSIS — Z34.02 ENCOUNTER FOR SUPERVISION OF NORMAL FIRST PREGNANCY IN SECOND TRIMESTER: Primary | ICD-10-CM

## 2018-03-19 DIAGNOSIS — O21.9 NAUSEA AND VOMITING IN PREGNANCY: ICD-10-CM

## 2018-03-19 PROCEDURE — 99212 OFFICE O/P EST SF 10 MIN: CPT | Mod: PBBFAC,TH,PO | Performed by: STUDENT IN AN ORGANIZED HEALTH CARE EDUCATION/TRAINING PROGRAM

## 2018-03-19 PROCEDURE — 99213 OFFICE O/P EST LOW 20 MIN: CPT | Mod: TH,S$PBB,, | Performed by: STUDENT IN AN ORGANIZED HEALTH CARE EDUCATION/TRAINING PROGRAM

## 2018-03-19 PROCEDURE — 99999 PR PBB SHADOW E&M-EST. PATIENT-LVL II: CPT | Mod: PBBFAC,,, | Performed by: STUDENT IN AN ORGANIZED HEALTH CARE EDUCATION/TRAINING PROGRAM

## 2018-03-19 NOTE — PROGRESS NOTES
Doing well,lost lost .5 kg since last visit, feeling better no questions,no problems.  I have reviewed the resident's note, evaluated the patient and agree with the diagnosis and management plan

## 2018-03-19 NOTE — PROGRESS NOTES
Complaints today: Denies vaginal bleeding, contractions, loss of fluid. N/V improved since last visit. Using zofran every other day for nausea/vomiting. States she is feeling better. Requests refill for PNV.    /70   Wt 70.7 kg (155 lb 13.8 oz)   LMP 2017 (Approximate)   BMI 25.16 kg/m²     27 y.o., at 15w5d by Estimated Date of Delivery: 18  Patient Active Problem List   Diagnosis    Nausea and vomiting in pregnancy    Pregnancy, supervision, normal, first     OB History    Para Term  AB Living   1 0 0 0 0 0   SAB TAB Ectopic Multiple Live Births   0 0 0 0 0      # Outcome Date GA Lbr Randell/2nd Weight Sex Delivery Anes PTL Lv   1 Current                   Dating reviewed    Allergies and problem list reviewed and updated    Medical and surgical history reviewed    Prenatal labs reviewed and updated    Physical Exam:  ABD: soft, gravid, nontender,     Assessment:  Ken was seen today for routine prenatal visit.    Diagnoses and all orders for this visit:    Encounter for supervision of normal first pregnancy in second trimester    Nausea and vomiting in pregnancy    Other orders  -     prenatal 21-iron fu-folic acid (PRENATAL COMPLETE) 14 mg iron- 400 mcg Tab; Take 1 tablet by mouth once daily.          Plan:   - s/p Flu shot  - Anatomy scan ordered and in  - completed first part of sequential screening  - continue zofran as needed for nausea vomiting. Patient weight is stable, has lost 1lb since last prenatal visit. Will continue to monitor.    follow up 4Weeks, bleeding/pain precautions  Given

## 2018-04-12 ENCOUNTER — RESEARCH ENCOUNTER (OUTPATIENT)
Dept: RESEARCH | Facility: HOSPITAL | Age: 28
End: 2018-04-12

## 2018-04-12 ENCOUNTER — LAB VISIT (OUTPATIENT)
Dept: LAB | Facility: OTHER | Age: 28
End: 2018-04-12
Attending: OBSTETRICS & GYNECOLOGY
Payer: MEDICAID

## 2018-04-12 ENCOUNTER — OFFICE VISIT (OUTPATIENT)
Dept: MATERNAL FETAL MEDICINE | Facility: CLINIC | Age: 28
End: 2018-04-12
Attending: OBSTETRICS & GYNECOLOGY
Payer: MEDICAID

## 2018-04-12 DIAGNOSIS — Z36.89 ENCOUNTER FOR FETAL ANATOMIC SURVEY: ICD-10-CM

## 2018-04-12 DIAGNOSIS — Z36.9 ENCOUNTER FOR ANTENATAL SCREENING: ICD-10-CM

## 2018-04-12 DIAGNOSIS — Z36.9 ENCOUNTER FOR ANTENATAL SCREENING: Primary | ICD-10-CM

## 2018-04-12 PROCEDURE — 76805 OB US >/= 14 WKS SNGL FETUS: CPT | Mod: PBBFAC | Performed by: OBSTETRICS & GYNECOLOGY

## 2018-04-12 PROCEDURE — 36415 COLL VENOUS BLD VENIPUNCTURE: CPT

## 2018-04-12 PROCEDURE — 76805 OB US >/= 14 WKS SNGL FETUS: CPT | Mod: 26,S$PBB,, | Performed by: OBSTETRICS & GYNECOLOGY

## 2018-04-12 PROCEDURE — 81511 FTL CGEN ABNOR FOUR ANAL: CPT

## 2018-04-12 PROCEDURE — 99499 UNLISTED E&M SERVICE: CPT | Mod: S$PBB,,, | Performed by: OBSTETRICS & GYNECOLOGY

## 2018-04-12 NOTE — PROGRESS NOTES
OB Ultrasound Report (see PDF version under imaging tab)    Indication  ========    Fetal anatomy survey.    Method  ======    Transabdominal ultrasound examination, 2D Color Doppler, [place] E10. View: Good view.    Pregnancy  =========    [place] pregnancy. Number of fetuses: 1.    Dating  ======    Ultrasound examination on: 4/12/2018  GA by U/S based upon: AC, BPD, Femur, HC  GA by U/S 19 w + 1 d  DANIEL by U/S: 9/5/2018  Assigned: Dating performed on 02/8/2018, based on ultrasound ([facility])  Assigned GA 19 w + 1 d  Assigned DANIEL: 9/5/2018    General Evaluation  ===============    Cardiac activity: present.  bpm.  Fetal movements: visualized.  Presentation: breech.  Placenta:  Placental site: posterior. Distance from internal cervical os 32 mm.  Umbilical cord: Cord vessels: 3 vessel cord. Cord insertion: placental insertion: normal.  Amniotic fluid: Amount of AF: normal amount.    Fetal Biometry  ===========    Fetal Biometry  BPD 42.6 mm 18w 6d Hadlock  OFD 56.6 mm 19w 6d Mylene  .8 mm 18w 5d Hadlock  .7 mm 19w 6d Hadlock  Femur 29.6 mm 19w 1d Hadlock  Cerebellum tr 19.8 mm 19w 5d Dent  CM 2.7 mm  Nuchal fold 2.94 mm  Humerus 27.1 mm 18w 4d Mylene   g  Calculated by: Hadlock (BPD-HC-AC-FL)  EFW (lb) 0 lb  EFW (oz) 10 oz  Cephalic index 0.75  HC / AC 1.10  FL / BPD 0.69  FL / AC 0.20   bpm  Head / Face / Neck   6.0 mm  Nasal bone 7.2 mm    Fetal Anatomy  ===========    Cranium: normal  Lateral ventricles: normal  Choroid plexus: normal  Midline falx: normal  Cavum septi pellucidi: normal  Cerebellum: normal  Cisterna magna: normal  Nuchal fold: normal  Lips: normal  Profile: normal  Nose: normal  4-chamber view: normal  RVOT: normal  LVOT: normal  Situs: normal  Aortic arch: normal  Ductal arch: normal  Cardiac rhythm: normal  Rt lung: normal  Lt lung: normal  Diaphragm: normal  Cord  insertion: normal  Stomach: normal  Kidneys: normal  Bladder: normal  Genitals: normal  Bowel: normal  Cervical spine: normal  Thoracic spine: normal  Lumbar spine: normal  Sacral spine: normal  Rt arm: normal  Lt arm: normal  Rt leg: normal  Lt leg: normal  Position of hands: normal  Position of feet: normal  Wants to know gender: no    Maternal Structures  ===============    Uterus / Cervix  Cervical length 38.6 mm  Ovaries / Tubes / Adnexa  Rt ovary: Normal  Lt ovary: Suboptimal    Impression  =========    A luna living IUP is identified. Fetal size is appropriate for established dating. No fetal structural malformations are identified.  Cervical length is normal, and placental location is normal without evidence of previa. Follow-up ultrasound as clinically indicated.

## 2018-04-12 NOTE — PROGRESS NOTES
..2016.165.B TREETOP Consent.      This morning I met with Ken Bills in [facility]. She is pregnant and here for a visit. We discussed the study in detail, including the purpose, numbers/length, procedures, risk/benefit, cost/payment, contacts (investigators/IRB), alternatives/voluntary nature/withdrawal, confidentiality/HIPPA. Dr. Muniz was available for questions. She verbalized understanding and had no questions. She consented to optional blood storage and genetic sequencing. The consent form was signed on [id number]. She was given a copy of the signed informed consent.       Her blood was drawn in the lab at the same time as the 2nd part of her sequential screen. After her blood was drawn, she was given her $25.00 gift card.       Blood was drawn at 11:13. Placed in centrifuge at room temperature at 11:24. hemolysis in both tubes. Tubes were combined at 11:36. Twenty 250 ul aliquots were place in the -80 freezer at 11:41.

## 2018-04-16 ENCOUNTER — ROUTINE PRENATAL (OUTPATIENT)
Dept: OBSTETRICS AND GYNECOLOGY | Facility: CLINIC | Age: 28
End: 2018-04-16
Payer: MEDICAID

## 2018-04-16 VITALS
WEIGHT: 160.06 LBS | SYSTOLIC BLOOD PRESSURE: 110 MMHG | DIASTOLIC BLOOD PRESSURE: 70 MMHG | BODY MASS INDEX: 25.83 KG/M2

## 2018-04-16 DIAGNOSIS — O21.9 NAUSEA AND VOMITING IN PREGNANCY: ICD-10-CM

## 2018-04-16 DIAGNOSIS — Z34.02 ENCOUNTER FOR SUPERVISION OF NORMAL FIRST PREGNANCY IN SECOND TRIMESTER: Primary | ICD-10-CM

## 2018-04-16 LAB
# FETUSES US: NORMAL
AFP MOM SERPL: 0.5
AFP SERPL-MCNC: 31.4 NG/ML
AGE AT DELIVERY: 28
B-HCG MOM SERPL: 0.5
B-HCG SERPL-ACNC: 11.1 IU/ML
COLLECT DATE BLD: NORMAL
COLLECT DATE: NORMAL
FET NASAL BONE LENGTH US.MEAS: NORMAL MM
FET NUCHAL FOLD MOM THICKNESS US.MEAS: 1.06
FET NUCHAL FOLD THICKNESS US.MEAS: 1.4 MM
FET TS 21 RISK FROM MAT AGE: NORMAL
GA (DAYS): 6 D
GA (WEEKS): 19 WK
GA METHOD: NORMAL
GEST. AGE (DAYS) 2ND SAMPLE (SS2): 2
GEST. AGE (WKS) 1ST SAMPLE (SS2): 12
IDDM PATIENT QL: NORMAL
INHIBIN A MOM SERPL: 0.4
INHIBIN A SERPL-MCNC: 64.2 PG/ML
INTEGRATED SCN PATIENT-IMP: NEGATIVE
PAPP-A MOM SERPL: 0.68
PAPP-A SERPL-MCNC: 869.4 NG/ML
SEQUENTIAL SCREEN PART 2 INTERP: NORMAL
TS 18 RISK FETUS: NORMAL
TS 21 RISK FETUS: NORMAL
U ESTRIOL MOM SERPL: 0.78
U ESTRIOL SERPL-MCNC: 1.2 NG/ML

## 2018-04-16 PROCEDURE — 99213 OFFICE O/P EST LOW 20 MIN: CPT | Mod: TH,S$PBB,, | Performed by: STUDENT IN AN ORGANIZED HEALTH CARE EDUCATION/TRAINING PROGRAM

## 2018-04-16 PROCEDURE — 99999 PR PBB SHADOW E&M-EST. PATIENT-LVL II: CPT | Mod: PBBFAC,,, | Performed by: STUDENT IN AN ORGANIZED HEALTH CARE EDUCATION/TRAINING PROGRAM

## 2018-04-16 PROCEDURE — 99212 OFFICE O/P EST SF 10 MIN: CPT | Mod: PBBFAC,TH,PO | Performed by: STUDENT IN AN ORGANIZED HEALTH CARE EDUCATION/TRAINING PROGRAM

## 2018-04-16 NOTE — PROGRESS NOTES
Complaints today:Denies vaginal bleeding, contractions, loss of fluid. Not yet feeling fetal movement. Nausea, improved from last visit, occasionally using zofran. Has gained weight since last visit.     /70   Wt 72.6 kg (160 lb 0.9 oz)   LMP 2017 (Approximate)   BMI 25.83 kg/m²     27 y.o., at 19w5d by Estimated Date of Delivery: 18  Patient Active Problem List   Diagnosis    Nausea and vomiting in pregnancy    Pregnancy, supervision, normal, first     OB History    Para Term  AB Living   1 0 0 0 0 0   SAB TAB Ectopic Multiple Live Births   0 0 0 0 0      # Outcome Date GA Lbr Randell/2nd Weight Sex Delivery Anes PTL Lv   1 Current                   Dating reviewed    Allergies and problem list reviewed and updated    Medical and surgical history reviewed    Prenatal labs reviewed and updated    Physical Exam:  ABD: soft, gravid, nontender,     Assessment:  Ken was seen today for routine prenatal visit.    Diagnoses and all orders for this visit:    Encounter for supervision of normal first pregnancy in second trimester  -     OB Glucose Screen  -     CBC auto differential    Nausea and vomiting in pregnancy          Plan:   - S/p flu shot  - 2 T labs and glucose screen today  - counseled on weight gain  - counseled on feeling fetal movement    follow up 4 Weeks, bleeding/pain precautions given.

## 2018-04-30 ENCOUNTER — HOSPITAL ENCOUNTER (EMERGENCY)
Facility: OTHER | Age: 28
Discharge: HOME | End: 2018-04-30
Payer: MEDICAID

## 2018-04-30 VITALS
DIASTOLIC BLOOD PRESSURE: 65 MMHG | RESPIRATION RATE: 18 BRPM | SYSTOLIC BLOOD PRESSURE: 107 MMHG | HEART RATE: 70 BPM | TEMPERATURE: 98 F | OXYGEN SATURATION: 100 %

## 2018-04-30 DIAGNOSIS — R42 DIZZINESS: Primary | ICD-10-CM

## 2018-04-30 DIAGNOSIS — Z3A.21 21 WEEKS GESTATION OF PREGNANCY: ICD-10-CM

## 2018-04-30 LAB
BASOPHILS # BLD AUTO: 0.02 K/UL
BASOPHILS NFR BLD: 0.2 %
BILIRUB SERPL-MCNC: NORMAL MG/DL
BLOOD URINE, POC: NORMAL
COLOR, POC UA: YELLOW
DIFFERENTIAL METHOD: ABNORMAL
EOSINOPHIL # BLD AUTO: 0.1 K/UL
EOSINOPHIL NFR BLD: 0.6 %
ERYTHROCYTE [DISTWIDTH] IN BLOOD BY AUTOMATED COUNT: 13.4 %
GLUCOSE UR QL STRIP: NORMAL
HCT VFR BLD AUTO: 36.3 %
HGB BLD-MCNC: 12.2 G/DL
KETONES UR QL STRIP: NORMAL
LEUKOCYTE ESTERASE URINE, POC: NORMAL
LYMPHOCYTES # BLD AUTO: 1.8 K/UL
LYMPHOCYTES NFR BLD: 19.3 %
MCH RBC QN AUTO: 30.3 PG
MCHC RBC AUTO-ENTMCNC: 33.6 G/DL
MCV RBC AUTO: 90 FL
MONOCYTES # BLD AUTO: 0.7 K/UL
MONOCYTES NFR BLD: 7.4 %
NEUTROPHILS # BLD AUTO: 6.6 K/UL
NEUTROPHILS NFR BLD: 72.3 %
NITRITE, POC UA: NORMAL
PH, POC UA: NORMAL
PLATELET # BLD AUTO: 217 K/UL
PMV BLD AUTO: 9.1 FL
POCT GLUCOSE: 121 MG/DL (ref 70–110)
PROTEIN, POC: NORMAL
RBC # BLD AUTO: 4.02 M/UL
SPECIFIC GRAVITY, POC UA: NORMAL
UROBILINOGEN, POC UA: NORMAL
WBC # BLD AUTO: 9.08 K/UL

## 2018-04-30 PROCEDURE — 99284 EMERGENCY DEPT VISIT MOD MDM: CPT | Mod: ,,, | Performed by: OBSTETRICS & GYNECOLOGY

## 2018-04-30 PROCEDURE — 99283 EMERGENCY DEPT VISIT LOW MDM: CPT | Mod: 25

## 2018-04-30 PROCEDURE — 81002 URINALYSIS NONAUTO W/O SCOPE: CPT

## 2018-04-30 PROCEDURE — 82962 GLUCOSE BLOOD TEST: CPT

## 2018-04-30 PROCEDURE — 85025 COMPLETE CBC W/AUTO DIFF WBC: CPT

## 2018-04-30 NOTE — DISCHARGE INSTRUCTIONS
Call clinic [phone] or L & D after hours at [phone] for vaginal bleeding, leakage of fluids, contractions 4-5 in one hour, headache not relieved by Tylenol, blurry vision, or temp of 100.4 or greater.  Begin doing fetal kick counts at least 10 movements in 2 hours starting at 28 weeks gestation.  Keep next clinic appointment

## 2018-04-30 NOTE — ED PROVIDER NOTES
"Encounter Date: 2018       History     Chief Complaint   Patient presents with    Dizziness     Ken Bills is a 27 y.o. [id number] at 21w5d presents complaining of feeling dizzy, lightheaded and weak while working to work today. Patient denies syncope or falling. Patient denies any CP or SOB. Last ate some chili cheese fries earlier this morning. Patient admits to not drinking a lot of water but drinking "cold drinks ". This IUP is uncomplicated. Patient denies contractions, denies vaginal bleeding, denies LOF.   Fetal Movement: normal.            Review of patient's allergies indicates:  No Known Allergies  Past Medical History:   Diagnosis Date    Allergic      Past Surgical History:   Procedure Laterality Date    NO PAST SURGERIES       Family History   Problem Relation Age of Onset    Breast cancer Neg Hx     Colon cancer Neg Hx     Ovarian cancer Neg Hx      Social History   Substance Use Topics    Smoking status: Never Smoker    Smokeless tobacco: Never Used    Alcohol use No      Comment: rarely     Review of Systems   Constitutional: Negative for chills, fatigue and fever.   HENT: Negative for congestion.    Eyes: Negative for visual disturbance.   Respiratory: Negative for cough and shortness of breath.    Cardiovascular: Negative for chest pain and palpitations.   Gastrointestinal: Negative for abdominal distention, abdominal pain, constipation, diarrhea, nausea and vomiting.   Genitourinary: Negative for difficulty urinating, dysuria, hematuria, vaginal bleeding and vaginal discharge.   Skin: Negative for rash.   Neurological: Positive for dizziness and light-headedness. Negative for seizures and headaches.   Hematological: Does not bruise/bleed easily.   Psychiatric/Behavioral: Negative for dysphoric mood. The patient is not nervous/anxious.        Physical Exam     Initial Vitals   BP Pulse Resp Temp SpO2   18 1506 18 1506 18 1502 18 1515 18 1507 "   109/62 82 18 98.4 °F (36.9 °C) 97 %      MAP       04/30/18 1506       77.67         Physical Exam    Vitals reviewed.  Constitutional: She appears well-developed and well-nourished. She is not diaphoretic. No distress.   HENT:   Head: Normocephalic and atraumatic.   Eyes: Conjunctivae are normal.   Neck: Neck supple.   Cardiovascular: Normal rate.   Pulmonary/Chest: No respiratory distress.   Abdominal: Soft. She exhibits no distension. There is no tenderness. There is no rebound and no guarding.   Gravid     Musculoskeletal: She exhibits no edema.   Neurological: She is alert and oriented to person, place, and time.   Skin: Skin is warm and dry. No rash noted. No erythema.   Psychiatric: She has a normal mood and affect. Her behavior is normal. Judgment and thought content normal.         ED Course   Procedures  Labs Reviewed   CBC W/ AUTO DIFFERENTIAL - Abnormal; Notable for the following:        Result Value    Hematocrit 36.3 (*)     MPV 9.1 (*)     All other components within normal limits   POCT GLUCOSE - Abnormal; Notable for the following:     POCT Glucose 121 (*)     All other components within normal limits   POCT URINALYSIS, DIPSTICK OR TABLET REAGENT, AUTOMATED, WITH MICROSCOP             Medical Decision Making:   ED Management:  FHTs confirmed 154  VSS  Patient not currently feeling light headed or dizzy  POCT glucose 121  Udip negative  CBC WNL, no signs of anemia  Discussed vasovagal episodes with patient. Discussed increasing po water intake, eating protein, and compression stockings with patient. Patient verbalized understanding.  ED and labor precautions discussed with patient               Attending Attestation:   Physician Attestation Statement for Resident:  As the supervising MD   Physician Attestation Statement: I have personally seen and examined this patient.   I agree with the above history. -:   As the supervising MD I agree with the above PE.    As the supervising MD I agree with the  above treatment, course, plan, and disposition.  I was personally present during the critical portions of the procedure(s) performed by the resident and was immediately available in the ED to provide services and assistance as needed during the entire procedure.  I have reviewed and agree with the residents interpretation of the following: lab data and EKG.  I have reviewed the following: old records at this facility.                       Clinical Impression:   The primary encounter diagnosis was Dizziness. A diagnosis of 21 weeks gestation of pregnancy was also pertinent to this visit.    Disposition:   Disposition: Discharged  Condition: Stable         Giuliana Macdonald MD  Resident  04/30/18 1607       Jessica Hampton DO  05/02/18 5930

## 2018-05-28 ENCOUNTER — ROUTINE PRENATAL (OUTPATIENT)
Dept: OBSTETRICS AND GYNECOLOGY | Facility: CLINIC | Age: 28
End: 2018-05-28
Payer: MEDICAID

## 2018-05-28 VITALS — WEIGHT: 168 LBS | BODY MASS INDEX: 27.96 KG/M2 | SYSTOLIC BLOOD PRESSURE: 110 MMHG | DIASTOLIC BLOOD PRESSURE: 60 MMHG

## 2018-05-28 DIAGNOSIS — Z34.92 SECOND TRIMESTER PREGNANCY: Primary | ICD-10-CM

## 2018-05-28 PROCEDURE — 99213 OFFICE O/P EST LOW 20 MIN: CPT | Mod: S$PBB,TH,, | Performed by: OBSTETRICS & GYNECOLOGY

## 2018-05-28 PROCEDURE — 99999 PR PBB SHADOW E&M-EST. PATIENT-LVL III: CPT | Mod: PBBFAC,,, | Performed by: STUDENT IN AN ORGANIZED HEALTH CARE EDUCATION/TRAINING PROGRAM

## 2018-05-28 PROCEDURE — 99213 OFFICE O/P EST LOW 20 MIN: CPT | Mod: PBBFAC,PO | Performed by: STUDENT IN AN ORGANIZED HEALTH CARE EDUCATION/TRAINING PROGRAM

## 2018-05-28 NOTE — PROGRESS NOTES
Complaints today: No complaints today. Patient missed her OB glucose screen and did not reschedule. Placed order again. Endorses good FM, no vaginal bleeding, no discharge, no contractions.     /60   Wt 76.2 kg (167 lb 15.9 oz)   LMP 2017 (Approximate)   BMI 27.96 kg/m²     27 y.o., at 25w5d by Estimated Date of Delivery: 18  Patient Active Problem List   Diagnosis    Nausea and vomiting in pregnancy    Pregnancy, supervision, normal, first     OB History    Para Term  AB Living   1 0 0 0 0 0   SAB TAB Ectopic Multiple Live Births   0 0 0 0 0      # Outcome Date GA Lbr Randell/2nd Weight Sex Delivery Anes PTL Lv   1 Current                   Dating reviewed    Allergies and problem list reviewed and updated    Medical and surgical history reviewed    Prenatal labs reviewed and updated    Physical Exam:  ABD: soft, gravid, nontender, fundal height 25    Assessment:  Normal 2T pregnancy    Plan:   - OB glucose screen re ordered  - follow up 2 Weeks, bleeding/pain precautions, kick counts, labor precautions counseled    Keaton Jarrett MD  OB/GYN PGY-2  Pager: [phone]

## 2018-05-28 NOTE — PROGRESS NOTES
FM pos  Doing well, no questions,no problems.  I have reviewed the resident's note, evaluated the patient and agree with the diagnosis and management plan

## 2018-05-30 ENCOUNTER — APPOINTMENT (OUTPATIENT)
Dept: LAB | Facility: HOSPITAL | Age: 28
End: 2018-05-30
Attending: OBSTETRICS & GYNECOLOGY
Payer: MEDICAID

## 2018-05-30 DIAGNOSIS — Z34.92 SECOND TRIMESTER PREGNANCY: Primary | ICD-10-CM

## 2018-05-30 LAB — GLUCOSE SERPL-MCNC: 57 MG/DL

## 2018-05-30 PROCEDURE — 82950 GLUCOSE TEST: CPT

## 2018-05-30 PROCEDURE — 36415 COLL VENOUS BLD VENIPUNCTURE: CPT | Mod: PO

## 2018-06-11 ENCOUNTER — ROUTINE PRENATAL (OUTPATIENT)
Dept: OBSTETRICS AND GYNECOLOGY | Facility: CLINIC | Age: 28
End: 2018-06-11
Payer: MEDICAID

## 2018-06-11 VITALS
BODY MASS INDEX: 27.48 KG/M2 | WEIGHT: 165.13 LBS | SYSTOLIC BLOOD PRESSURE: 116 MMHG | DIASTOLIC BLOOD PRESSURE: 70 MMHG

## 2018-06-11 DIAGNOSIS — Z34.92 SECOND TRIMESTER PREGNANCY: Primary | ICD-10-CM

## 2018-06-11 PROCEDURE — 99213 OFFICE O/P EST LOW 20 MIN: CPT | Mod: S$PBB,TH,, | Performed by: STUDENT IN AN ORGANIZED HEALTH CARE EDUCATION/TRAINING PROGRAM

## 2018-06-11 PROCEDURE — 99212 OFFICE O/P EST SF 10 MIN: CPT | Mod: PBBFAC,PO | Performed by: STUDENT IN AN ORGANIZED HEALTH CARE EDUCATION/TRAINING PROGRAM

## 2018-06-11 PROCEDURE — 99999 PR PBB SHADOW E&M-EST. PATIENT-LVL II: CPT | Mod: PBBFAC,,, | Performed by: STUDENT IN AN ORGANIZED HEALTH CARE EDUCATION/TRAINING PROGRAM

## 2018-06-11 NOTE — PROGRESS NOTES
Complaints today: none. Passed OB glucose screen. Good FM. No complaints.     /70   Wt 74.9 kg (165 lb 2 oz)   LMP 2017 (Approximate)   BMI 27.48 kg/m²     27 y.o., at 27w5d by Estimated Date of Delivery: 18  Patient Active Problem List   Diagnosis    Nausea and vomiting in pregnancy    Pregnancy, supervision, normal, first     OB History    Para Term  AB Living   1 0 0 0 0 0   SAB TAB Ectopic Multiple Live Births   0 0 0 0 0      # Outcome Date GA Lbr Randell/2nd Weight Sex Delivery Anes PTL Lv   1 Current                   Dating reviewed    Allergies and problem list reviewed and updated    Medical and surgical history reviewed    Prenatal labs reviewed and updated    Physical Exam:  ABD: soft, gravid, nontender, fundal height at 28 cm    Assessment:  Normal G1 third trimester    Plan:    follow up 2 Weeks, bleeding/pain precautions, kick counts, labor precautions counselled    Keaton Jarrett MD  OB/GYN PGY-2  Pager: [phone]

## 2018-06-11 NOTE — PROGRESS NOTES
Doing well, no questions,no problems. FM pos.  I have reviewed the resident's note, evaluated the patient and agree with the diagnosis and management plan

## 2018-06-25 ENCOUNTER — ROUTINE PRENATAL (OUTPATIENT)
Dept: OBSTETRICS AND GYNECOLOGY | Facility: CLINIC | Age: 28
End: 2018-06-25
Payer: MEDICAID

## 2018-06-25 VITALS
DIASTOLIC BLOOD PRESSURE: 70 MMHG | WEIGHT: 170.88 LBS | BODY MASS INDEX: 28.43 KG/M2 | SYSTOLIC BLOOD PRESSURE: 118 MMHG

## 2018-06-25 DIAGNOSIS — Z34.03 ENCOUNTER FOR SUPERVISION OF NORMAL FIRST PREGNANCY IN THIRD TRIMESTER: Primary | ICD-10-CM

## 2018-06-25 PROCEDURE — 99213 OFFICE O/P EST LOW 20 MIN: CPT | Mod: TH,S$PBB,, | Performed by: STUDENT IN AN ORGANIZED HEALTH CARE EDUCATION/TRAINING PROGRAM

## 2018-06-25 PROCEDURE — 99999 PR PBB SHADOW E&M-EST. PATIENT-LVL II: CPT | Mod: PBBFAC,,, | Performed by: STUDENT IN AN ORGANIZED HEALTH CARE EDUCATION/TRAINING PROGRAM

## 2018-06-25 PROCEDURE — 99212 OFFICE O/P EST SF 10 MIN: CPT | Mod: PBBFAC,TH,PO | Performed by: STUDENT IN AN ORGANIZED HEALTH CARE EDUCATION/TRAINING PROGRAM

## 2018-06-25 RX ORDER — ONDANSETRON 4 MG/1
4 TABLET, FILM COATED ORAL EVERY 8 HOURS PRN
Qty: 60 TABLET | Refills: 3 | Status: ON HOLD | OUTPATIENT
Start: 2018-06-25 | End: 2018-09-14 | Stop reason: HOSPADM

## 2018-06-25 NOTE — PROGRESS NOTES
Complaints today: mildly nauseous. No other complaints. Endorses good FM. Denies contractions.     /70   Wt 77.5 kg (170 lb 13.7 oz)   LMP 2017 (Approximate)   BMI 28.43 kg/m²     28 y.o., at 29w5d by Estimated Date of Delivery: 18  Patient Active Problem List   Diagnosis    Nausea and vomiting in pregnancy    Pregnancy, supervision, normal, first     OB History    Para Term  AB Living   1 0 0 0 0 0   SAB TAB Ectopic Multiple Live Births   0 0 0 0 0      # Outcome Date GA Lbr Randell/2nd Weight Sex Delivery Anes PTL Lv   1 Current                   Dating reviewed    Allergies and problem list reviewed and updated    Medical and surgical history reviewed    Prenatal labs reviewed and updated    Physical Exam:  ABD: soft, gravid, nontender, fundal height at 29 cm    Assessment:  Normal first pregnancy    Plan:   - Zofran for nausea  -  follow up 2 Weeks, bleeding/pain precautions, kick counts, labor precautions counseled

## 2018-07-09 ENCOUNTER — ROUTINE PRENATAL (OUTPATIENT)
Dept: OBSTETRICS AND GYNECOLOGY | Facility: CLINIC | Age: 28
End: 2018-07-09
Payer: MEDICAID

## 2018-07-09 ENCOUNTER — APPOINTMENT (OUTPATIENT)
Dept: LAB | Facility: HOSPITAL | Age: 28
End: 2018-07-09
Attending: OBSTETRICS & GYNECOLOGY
Payer: MEDICAID

## 2018-07-09 VITALS
SYSTOLIC BLOOD PRESSURE: 124 MMHG | WEIGHT: 169.06 LBS | DIASTOLIC BLOOD PRESSURE: 78 MMHG | BODY MASS INDEX: 28.14 KG/M2

## 2018-07-09 DIAGNOSIS — Z3A.31 31 WEEKS GESTATION OF PREGNANCY: Primary | ICD-10-CM

## 2018-07-09 LAB
BACTERIA #/AREA URNS AUTO: NORMAL /HPF
BASOPHILS # BLD AUTO: 0.03 K/UL
BASOPHILS NFR BLD: 0.4 %
BILIRUB UR QL STRIP: NEGATIVE
CLARITY UR REFRACT.AUTO: CLEAR
COLOR UR AUTO: YELLOW
DIFFERENTIAL METHOD: ABNORMAL
EOSINOPHIL # BLD AUTO: 0.1 K/UL
EOSINOPHIL NFR BLD: 1.3 %
ERYTHROCYTE [DISTWIDTH] IN BLOOD BY AUTOMATED COUNT: 12.9 %
GLUCOSE UR QL STRIP: NEGATIVE
HCT VFR BLD AUTO: 36.8 %
HGB BLD-MCNC: 12 G/DL
HGB UR QL STRIP: NEGATIVE
IMM GRANULOCYTES # BLD AUTO: 0.04 K/UL
IMM GRANULOCYTES NFR BLD AUTO: 0.6 %
KETONES UR QL STRIP: NEGATIVE
LEUKOCYTE ESTERASE UR QL STRIP: ABNORMAL
LYMPHOCYTES # BLD AUTO: 1.5 K/UL
LYMPHOCYTES NFR BLD: 20.8 %
MCH RBC QN AUTO: 29.5 PG
MCHC RBC AUTO-ENTMCNC: 32.6 G/DL
MCV RBC AUTO: 90 FL
MICROSCOPIC COMMENT: NORMAL
MONOCYTES # BLD AUTO: 0.8 K/UL
MONOCYTES NFR BLD: 10.9 %
NEUTROPHILS # BLD AUTO: 4.7 K/UL
NEUTROPHILS NFR BLD: 66 %
NITRITE UR QL STRIP: NEGATIVE
NRBC BLD-RTO: 0 /100 WBC
PH UR STRIP: 6 [PH] (ref 5–8)
PLATELET # BLD AUTO: 237 K/UL
PMV BLD AUTO: 10.3 FL
PROT UR QL STRIP: NEGATIVE
RBC # BLD AUTO: 4.07 M/UL
RBC #/AREA URNS AUTO: 1 /HPF (ref 0–4)
SP GR UR STRIP: 1.01 (ref 1–1.03)
SQUAMOUS #/AREA URNS AUTO: 12 /HPF
URN SPEC COLLECT METH UR: ABNORMAL
UROBILINOGEN UR STRIP-ACNC: NEGATIVE EU/DL
WBC # BLD AUTO: 7.16 K/UL
WBC #/AREA URNS AUTO: 1 /HPF (ref 0–5)

## 2018-07-09 PROCEDURE — 99213 OFFICE O/P EST LOW 20 MIN: CPT | Mod: PBBFAC,PO | Performed by: STUDENT IN AN ORGANIZED HEALTH CARE EDUCATION/TRAINING PROGRAM

## 2018-07-09 PROCEDURE — 99213 OFFICE O/P EST LOW 20 MIN: CPT | Mod: TH,S$PBB,, | Performed by: STUDENT IN AN ORGANIZED HEALTH CARE EDUCATION/TRAINING PROGRAM

## 2018-07-09 PROCEDURE — 81001 URINALYSIS AUTO W/SCOPE: CPT

## 2018-07-09 PROCEDURE — 86703 HIV-1/HIV-2 1 RESULT ANTBDY: CPT

## 2018-07-09 PROCEDURE — 99999 PR PBB SHADOW E&M-EST. PATIENT-LVL III: CPT | Mod: PBBFAC,,, | Performed by: STUDENT IN AN ORGANIZED HEALTH CARE EDUCATION/TRAINING PROGRAM

## 2018-07-09 PROCEDURE — 86592 SYPHILIS TEST NON-TREP QUAL: CPT

## 2018-07-09 PROCEDURE — 85025 COMPLETE CBC W/AUTO DIFF WBC: CPT

## 2018-07-09 NOTE — PROGRESS NOTES
Patient has no complaints today. No contractions, bleeding, leakage of fluid. Reports good fetal movement. Nausea has mostly resolved with prn zofran.     /78   Wt 76.7 kg (169 lb 1.5 oz)   LMP 2017 (Approximate)   BMI 28.14 kg/m²     28 y.o., at 31w5d by Estimated Date of Delivery: 18  Patient Active Problem List   Diagnosis    Nausea and vomiting in pregnancy    Pregnancy, supervision, normal, first     OB History    Para Term  AB Living   1 0 0 0 0 0   SAB TAB Ectopic Multiple Live Births   0 0 0 0 0      # Outcome Date GA Lbr Randell/2nd Weight Sex Delivery Anes PTL Lv   1 Current                   Dating reviewed    Allergies and problem list reviewed and updated    Medical and surgical history reviewed    Prenatal labs reviewed and updated    Physical Exam:  GEN: Alert, oriented  RESP: No increased work of breathing.  CARDIO: Regular rate  ABD: Soft, gravid, nontender  EXT: No edema    Assessment:  29 yo G1 at 31w5d presents for follow up.    Plan:   - Third trimester labs drawn today.  - Follow up in 2 weeks.    Mare Lopez MD  OBGYN PGY-1

## 2018-07-09 NOTE — PROGRESS NOTES
Needs blood work   Doing well, no questions,no problems, FM pos.  I have reviewed the resident's note, evaluated the patient and agree with the diagnosis and management plan

## 2018-07-10 LAB
HIV 1+2 AB+HIV1 P24 AG SERPL QL IA: NEGATIVE
RPR SER QL: NORMAL

## 2018-07-18 ENCOUNTER — HOSPITAL ENCOUNTER (EMERGENCY)
Facility: OTHER | Age: 28
Discharge: HOME | End: 2018-07-18
Attending: OBSTETRICS & GYNECOLOGY
Payer: MEDICAID

## 2018-07-18 VITALS
DIASTOLIC BLOOD PRESSURE: 73 MMHG | RESPIRATION RATE: 18 BRPM | TEMPERATURE: 97 F | HEART RATE: 90 BPM | OXYGEN SATURATION: 97 % | SYSTOLIC BLOOD PRESSURE: 104 MMHG

## 2018-07-18 DIAGNOSIS — Z36.89 NST (NON-STRESS TEST) REACTIVE: ICD-10-CM

## 2018-07-18 DIAGNOSIS — R42 LIGHTHEADEDNESS: ICD-10-CM

## 2018-07-18 DIAGNOSIS — R55 VASOVAGAL NEAR SYNCOPE: Primary | ICD-10-CM

## 2018-07-18 DIAGNOSIS — Z3A.33 33 WEEKS GESTATION OF PREGNANCY: ICD-10-CM

## 2018-07-18 LAB
GLUCOSE SERPL-MCNC: 110 MG/DL (ref 70–110)
POCT GLUCOSE: 110 MG/DL (ref 70–110)

## 2018-07-18 PROCEDURE — 99284 EMERGENCY DEPT VISIT MOD MDM: CPT | Mod: 25,,, | Performed by: OBSTETRICS & GYNECOLOGY

## 2018-07-18 PROCEDURE — 59025 FETAL NON-STRESS TEST: CPT | Mod: 26,,, | Performed by: OBSTETRICS & GYNECOLOGY

## 2018-07-18 PROCEDURE — 59025 FETAL NON-STRESS TEST: CPT

## 2018-07-18 PROCEDURE — 99284 EMERGENCY DEPT VISIT MOD MDM: CPT | Mod: 25

## 2018-07-18 PROCEDURE — 82962 GLUCOSE BLOOD TEST: CPT

## 2018-07-18 NOTE — DISCHARGE INSTRUCTIONS
Call provider/return to clinic if you experience vaginal bleeding (other than spotting in the next 24 hours), leaking of fluid, 6 or more contractions in one hour, or decreased fetal movement (less than 10 movements in 2 hours).    Drink plenty of fluid - at least 10 - 12 glasses of water daily.    Call provider/return to clinic if you experience headache unrelieved by Tylenol, blurry vision, or pain in your right upper abdomen.     Your feedback is important to us.  If you should receive a survey in the next few days, please share your experience with us.    Dizziness or Fainting During Pregnancy  Feeling dizzy or faint is very common during pregnancy. It generally does not mean something is wrong. It is most common during the first trimester, but it can happen anytime during pregnancy. Dizziness and fainting (syncope) are often caused by a drop in blood pressure. This is from the hormones released during pregnancy that relax the bodys blood vessels. Too little blood is then pumped up to the brain. When this happens, you lose consciousness (faint). Fainting is not dangerous to you or your baby unless you fall and hurt yourself.     If you must stand for long periods, compression stockings can help keep your blood moving.   Home care  To help prevent dizziness and fainting:  · When you get up from sitting or lying down, do so slowly. Clench and release your leg muscles before and during standing.  · Avoid standing for too long. If you must stand for prolonged periods of time, wear compression stockings. These are special stockings that help keep blood flowing toward the heart. Most medical supply stores sell these stockings.  · Avoid long breaks between meals. Keep snacks handy in case you get hungry.  · Avoid hot showers or baths. Use warm water. Be careful to stand up from a bath slowly.  · After the first trimester, avoid lying on your back.  · Try to eat every few hours. Instead of eating 3 larger meals a day,  try eating 6 smaller meals. Snack on healthy foods that contain some protein like nuts and peanut butter.  · If you have been told you are anemic, eating iron-rich foods may be helpful.  If you feel dizzy:  · Sit or lie down. If you sit, put your head between your knees. If you lie down, try to get your feet higher than your head.  · Take deep breaths. Breathe out slowly.  · Move toward fresh or circulating air.  · Loosen tight clothing.  · Do not eat or drink anything while you feel dizzy  Follow-up care  Follow up with your healthcare provider, or as advised. Arrange for prenatal care if you havent already. Go to all your prenatal appointments. Get prenatal tests as instructed.  When to seek medical advice  Call your healthcare provider right away if you have any of the following symptoms. These may mean a more severe cause for dizziness or fainting:  · Vaginal bleeding  · Pain in your abdomen  · Less movement of the baby than usual  · Unusually pale skin  · Dizziness or fainting with blurred vision, headaches, confusion, palpitations, or fast heartbeat  · You have fainted and hurt yourself or fallen hard on your abdomen  Date Last Reviewed: 6/1/2016  © 0439-1862 [employer]. [address] [place] [address], [place] [address]. All rights reserved. This information is not intended as a substitute for professional medical care. Always follow your healthcare professional's instructions.

## 2018-07-18 NOTE — PROGRESS NOTES
Pt presents to OB ED c/o episode of dizziness while at work at 1400. She became dizzy, began sweating, and felt as if she were going to pass out. Feels less dizzy now. Stated she only drank one bottle of water today. Ate lunch. No pain, ctx, vaginal bleeding, or LOF. + FM. Dr. Lopez notified of above.

## 2018-07-18 NOTE — ED PROVIDER NOTES
Encounter Date: 7/18/2018       History     Chief Complaint   Patient presents with    Dizziness     29 yo G1 at 33w0d presents for an episode of sweating, dizziness and light-headness for the past hour. States that she was at work at [employer] when she began to feel as though she may pass out. She states that she has not experienced this before. Reports that she has eaten twice today, but has not been drinking water. No contractions, vaginal bleeding, leakage of fluid. Reports good fetal movement.    These symptoms have happened to her a few weeks ago.  [facility]; no complications this pregnancy.          Review of patient's allergies indicates:  No Known Allergies  Past Medical History:   Diagnosis Date    Allergic      Past Surgical History:   Procedure Laterality Date    NO PAST SURGERIES       Family History   Problem Relation Age of Onset    Breast cancer Neg Hx     Colon cancer Neg Hx     Ovarian cancer Neg Hx      Social History   Substance Use Topics    Smoking status: Never Smoker    Smokeless tobacco: Never Used    Alcohol use No      Comment: rarely     Review of Systems   Constitutional: Positive for diaphoresis. Negative for activity change, appetite change, chills and fever.   HENT: Negative for congestion and facial swelling.    Eyes: Negative for photophobia and visual disturbance.   Respiratory: Negative for apnea, cough, chest tightness and shortness of breath.    Cardiovascular: Negative for chest pain and palpitations.   Gastrointestinal: Negative for abdominal pain, constipation, diarrhea, nausea and vomiting.   Genitourinary: Negative for dysuria, frequency, pelvic pain, vaginal bleeding and vaginal discharge.   Musculoskeletal: Negative for back pain.   Neurological: Positive for dizziness and light-headedness. Negative for weakness and headaches.   Psychiatric/Behavioral: Negative for dysphoric mood, self-injury and suicidal ideas.       Physical  Exam     Initial Vitals   BP Pulse Resp Temp SpO2   07/18/18 1535 07/18/18 1535 07/18/18 1535 07/18/18 1534 07/18/18 1555   96/61 86 18 97 °F (36.1 °C) 100 %      MAP       --              /73   Pulse 90   Temp 97 °F (36.1 °C)   Resp 18   LMP 12/13/2017 (Approximate)   SpO2 97%     Physical Exam    Vitals reviewed.  Constitutional: She appears well-developed and well-nourished. She is not diaphoretic. No distress.   HENT:   Head: Normocephalic and atraumatic.   Nose: Nose normal.   Eyes: EOM are normal. Right eye exhibits no discharge. Left eye exhibits no discharge.   Cardiovascular: Normal rate, regular rhythm and normal heart sounds. Exam reveals no gallop and no friction rub.    No murmur heard.  Pulmonary/Chest: Breath sounds normal. No respiratory distress. She has no wheezes. She exhibits no tenderness.   Abdominal: Bowel sounds are normal. There is no tenderness.   Gravid   Musculoskeletal: She exhibits no edema or tenderness.   Neurological: She is alert and oriented to person, place, and time.   Skin: Skin is warm and dry.   Psychiatric: She has a normal mood and affect. Her behavior is normal. Judgment and thought content normal.         ED Course   Obtain Fetal nonstress test (NST)  Date/Time: 7/18/2018 3:49 PM  Performed by: CHRIS SHIRLEY  Authorized by: CHRIS SHIRLEY     Nonstress Test:     Variability:  6-25 BPM    Decelerations:  None    Accelerations:  15 bpm    Acoustic Stimulator: No      Baseline:  150    Contractions:  Not present  Biophysical Profile:     Nonstress Test Interpretation: reactive      Overall Impression:  Reassuring      Labs Reviewed   POCT GLUCOSE MONITORING CONTINUOUS - Normal   POCT URINALYSIS, DIPSTICK OR TABLET REAGENT, AUTOMATED, WITH MICROSCOP   POCT GLUCOSE          Imaging Results    None          Medical Decision Making:   Initial Assessment:   29 yo G1 at 33w0d presents for an episode of sweating, light-headedness, and dizziness.  ED  Management:  Vital signs stable.  NST reactive.  Accucheck 101  Urine dipstick negative  LINDA hose placed.  PO hydration with resolution of symptoms.              Attending Attestation:   Physician Attestation Statement for Resident:  As the supervising MD   Physician Attestation Statement: I have personally seen and examined this patient.   I agree with the above history. -:   As the supervising MD I agree with the above PE.    As the supervising MD I agree with the above treatment, course, plan, and disposition.  I was personally present during the critical portions of the procedure(s) performed by the resident and was immediately available in the ED to provide services and assistance as needed during the entire procedure.  I have reviewed and agree with the residents interpretation of the following: lab data.  I have reviewed the following: old records at this facility.                       Clinical Impression:   The primary encounter diagnosis was Vasovagal near syncope. Diagnoses of Lightheadedness, 33 weeks gestation of pregnancy, and NST (non-stress test) reactive were also pertinent to this visit.      Disposition:   Disposition: Discharged  Condition: Stable  Near syncope likely related to dehydration.  Instructed patient to drink 8-10 cups of water daily.  Encouraged patient to keep follow up appointment at [facility].       Mare Lopez MD  OBGYN PGY-1                     Mare Lopez MD  Resident  07/18/18 1983       Ragini Vargas MD  07/18/18 9840

## 2018-07-23 ENCOUNTER — ROUTINE PRENATAL (OUTPATIENT)
Dept: OBSTETRICS AND GYNECOLOGY | Facility: CLINIC | Age: 28
End: 2018-07-23
Payer: MEDICAID

## 2018-07-23 VITALS
BODY MASS INDEX: 28.62 KG/M2 | DIASTOLIC BLOOD PRESSURE: 72 MMHG | SYSTOLIC BLOOD PRESSURE: 110 MMHG | WEIGHT: 171.94 LBS

## 2018-07-23 DIAGNOSIS — Z3A.33 33 WEEKS GESTATION OF PREGNANCY: ICD-10-CM

## 2018-07-23 PROCEDURE — 99212 OFFICE O/P EST SF 10 MIN: CPT | Mod: PBBFAC,PO | Performed by: STUDENT IN AN ORGANIZED HEALTH CARE EDUCATION/TRAINING PROGRAM

## 2018-07-23 PROCEDURE — 99213 OFFICE O/P EST LOW 20 MIN: CPT | Mod: TH,S$PBB,, | Performed by: STUDENT IN AN ORGANIZED HEALTH CARE EDUCATION/TRAINING PROGRAM

## 2018-07-23 PROCEDURE — 99999 PR PBB SHADOW E&M-EST. PATIENT-LVL II: CPT | Mod: PBBFAC,,, | Performed by: STUDENT IN AN ORGANIZED HEALTH CARE EDUCATION/TRAINING PROGRAM

## 2018-07-23 NOTE — PROGRESS NOTES
29 yo G1 at 33w5d presents for routine follow up. Denies contractions, vaginal bleeding, leakage of fluid. Reports good fetal movement. Patient went to OB ED last week for episode of dizziness and sweating. States that she has not had any more episodes like this.    /72   Wt 78 kg (171 lb 15.3 oz)   LMP 2017 (Approximate)   BMI 28.62 kg/m²     28 y.o., at 33w5d by Estimated Date of Delivery: 18  Patient Active Problem List   Diagnosis    Nausea and vomiting in pregnancy    Pregnancy, supervision, normal, first     OB History    Para Term  AB Living   1 0 0 0 0 0   SAB TAB Ectopic Multiple Live Births   0 0 0 0 0      # Outcome Date GA Lbr Randell/2nd Weight Sex Delivery Anes PTL Lv   1 Current                   Dating reviewed    Allergies and problem list reviewed and updated    Medical and surgical history reviewed    Prenatal labs reviewed and updated    Physical Exam:.  GEN: Alert, no apparent distress  CARDIO: Regular rate and rhythm  RESP: Clear to auscultation bilaterally  ABD: Soft, gravid, nontender  EXT: No edema    Assessment:  29 yo G1 at 33w5d presents for routine follow up.    Plan:   - GBS swab at next visit.  - Discussed symptoms of hypoglycemia and ways to manage episodes.  - Follow up 2 weeks. Educated patient regarding kick counts, labor precautions.    Mare Lopez MD  OBGYN PGY-1

## 2018-07-23 NOTE — PROGRESS NOTES
Discussed episodes of hypoglycemia and use of protein snack.  Doing well, no questions,no problems. FM pos.  I have reviewed the resident's note, evaluated the patient and agree with the diagnosis and management plan  RTC 2 weeks.

## 2018-08-06 ENCOUNTER — ROUTINE PRENATAL (OUTPATIENT)
Dept: OBSTETRICS AND GYNECOLOGY | Facility: CLINIC | Age: 28
End: 2018-08-06
Payer: MEDICAID

## 2018-08-06 VITALS — BODY MASS INDEX: 28.4 KG/M2 | WEIGHT: 170.63 LBS | SYSTOLIC BLOOD PRESSURE: 104 MMHG | DIASTOLIC BLOOD PRESSURE: 64 MMHG

## 2018-08-06 DIAGNOSIS — Z34.03 ENCOUNTER FOR SUPERVISION OF NORMAL FIRST PREGNANCY IN THIRD TRIMESTER: Primary | ICD-10-CM

## 2018-08-06 DIAGNOSIS — Z3A.35 35 WEEKS GESTATION OF PREGNANCY: ICD-10-CM

## 2018-08-06 PROCEDURE — 87081 CULTURE SCREEN ONLY: CPT

## 2018-08-06 PROCEDURE — 99213 OFFICE O/P EST LOW 20 MIN: CPT | Mod: PBBFAC,TH,PO | Performed by: STUDENT IN AN ORGANIZED HEALTH CARE EDUCATION/TRAINING PROGRAM

## 2018-08-06 PROCEDURE — 99213 OFFICE O/P EST LOW 20 MIN: CPT | Mod: TH,S$PBB,, | Performed by: STUDENT IN AN ORGANIZED HEALTH CARE EDUCATION/TRAINING PROGRAM

## 2018-08-06 PROCEDURE — 87184 SC STD DISK METHOD PER PLATE: CPT

## 2018-08-06 PROCEDURE — 99999 PR PBB SHADOW E&M-EST. PATIENT-LVL III: CPT | Mod: PBBFAC,,, | Performed by: STUDENT IN AN ORGANIZED HEALTH CARE EDUCATION/TRAINING PROGRAM

## 2018-08-06 PROCEDURE — 87147 CULTURE TYPE IMMUNOLOGIC: CPT

## 2018-08-07 NOTE — PROGRESS NOTES
Complaints today: None. Patient is doing well. Nausea and vomiting is well-controlled with zofran. Reports fetal movement. Denies contractions, vaginal bleeding, and LOF.    /64   Wt 77.4 kg (170 lb 10.2 oz)   LMP 2017 (Approximate)   BMI 28.40 kg/m²     28 y.o., at 35w5d by Estimated Date of Delivery: 18  Patient Active Problem List   Diagnosis    Nausea and vomiting in pregnancy    Pregnancy, supervision, normal, first    33 weeks gestation of pregnancy     OB History    Para Term  AB Living   1 0 0 0 0 0   SAB TAB Ectopic Multiple Live Births   0 0 0 0 0      # Outcome Date GA Lbr Randell/2nd Weight Sex Delivery Anes PTL Lv   1 Current                   Dating reviewed    Allergies and problem list reviewed and updated    Medical and surgical history reviewed    Prenatal labs reviewed and updated    Physical Exam:  ABD: soft, gravid, nontender  Cervical check: closed, thick, and high    Assessment:  29 yo  at 35w5d presenting for VIPUL visit, overall doing well.    Plan:   - GBS and cervical check today  - Continue zofran prn nausea  - Kick counts and labor precautions reviewed, patient voiced understanding  - Follow up: 2 Weeks    Mikaela Blanco MD  OBGYN, PGY-1

## 2018-08-10 LAB — BACTERIA SPEC AEROBE CULT: NORMAL

## 2018-08-20 ENCOUNTER — ROUTINE PRENATAL (OUTPATIENT)
Dept: OBSTETRICS AND GYNECOLOGY | Facility: CLINIC | Age: 28
End: 2018-08-20
Payer: MEDICAID

## 2018-08-20 VITALS
SYSTOLIC BLOOD PRESSURE: 116 MMHG | DIASTOLIC BLOOD PRESSURE: 74 MMHG | WEIGHT: 175.25 LBS | BODY MASS INDEX: 29.17 KG/M2

## 2018-08-20 DIAGNOSIS — Z3A.37 37 WEEKS GESTATION OF PREGNANCY: Primary | ICD-10-CM

## 2018-08-20 PROCEDURE — 99213 OFFICE O/P EST LOW 20 MIN: CPT | Mod: TH,S$PBB,, | Performed by: STUDENT IN AN ORGANIZED HEALTH CARE EDUCATION/TRAINING PROGRAM

## 2018-08-20 PROCEDURE — 99212 OFFICE O/P EST SF 10 MIN: CPT | Mod: PBBFAC,PO | Performed by: STUDENT IN AN ORGANIZED HEALTH CARE EDUCATION/TRAINING PROGRAM

## 2018-08-20 PROCEDURE — 99999 PR PBB SHADOW E&M-EST. PATIENT-LVL II: CPT | Mod: PBBFAC,,, | Performed by: STUDENT IN AN ORGANIZED HEALTH CARE EDUCATION/TRAINING PROGRAM

## 2018-08-20 NOTE — PROGRESS NOTES
Doing well, no questions,no problems.no contractions, FM pos.  I have reviewed the resident's note, evaluated the patient and agree with the diagnosis and management plan

## 2018-08-20 NOTE — PROGRESS NOTES
Complaints today: None. Doing well. Reports good fetal movement. Denies contractions, bleeding, and LOF. Denies n/v with zofran prn.    /74   Wt 79.5 kg (175 lb 4.3 oz)   LMP 2017 (Approximate)   BMI 29.17 kg/m²     28 y.o., at 37w5d by Estimated Date of Delivery: 18  Patient Active Problem List   Diagnosis    Nausea and vomiting in pregnancy    Pregnancy, supervision, normal, first    33 weeks gestation of pregnancy     OB History    Para Term  AB Living   1 0 0 0 0 0   SAB TAB Ectopic Multiple Live Births   0 0 0 0 0      # Outcome Date GA Lbr Randell/2nd Weight Sex Delivery Anes PTL Lv   1 Current                   Dating reviewed    Allergies and problem list reviewed and updated    Medical and surgical history reviewed    Prenatal labs reviewed and updated    Physical Exam:  ABD: soft, gravid, nontender    SVE: cl/th/hi, medium consistency, posterior    Assessment:  27 yo  at 37w5d presents for VIPUL visit, overall doing well.    Plan:   - Patient doing well today, labs reviewed  - GBS+  - Labor precautions reviewed, patient voiced understanding  - Follow-up: 1 week    Mikaela Blanco MD  OBGYN, PGY-1

## 2018-08-27 ENCOUNTER — ROUTINE PRENATAL (OUTPATIENT)
Dept: OBSTETRICS AND GYNECOLOGY | Facility: CLINIC | Age: 28
End: 2018-08-27
Payer: MEDICAID

## 2018-08-27 VITALS — SYSTOLIC BLOOD PRESSURE: 106 MMHG | DIASTOLIC BLOOD PRESSURE: 74 MMHG | WEIGHT: 173.5 LBS | BODY MASS INDEX: 28.87 KG/M2

## 2018-08-27 DIAGNOSIS — Z3A.38 38 WEEKS GESTATION OF PREGNANCY: Primary | ICD-10-CM

## 2018-08-27 PROCEDURE — 99999 PR PBB SHADOW E&M-EST. PATIENT-LVL II: CPT | Mod: PBBFAC,,, | Performed by: STUDENT IN AN ORGANIZED HEALTH CARE EDUCATION/TRAINING PROGRAM

## 2018-08-27 PROCEDURE — 99212 OFFICE O/P EST SF 10 MIN: CPT | Mod: PBBFAC,PO | Performed by: STUDENT IN AN ORGANIZED HEALTH CARE EDUCATION/TRAINING PROGRAM

## 2018-08-27 PROCEDURE — 99213 OFFICE O/P EST LOW 20 MIN: CPT | Mod: TH,S$PBB,, | Performed by: OBSTETRICS & GYNECOLOGY

## 2018-08-27 NOTE — PROGRESS NOTES
29yo  at 38w5d who presents today for VIPUL visit. Ms. Bills complains of crampy abdominal pain but isn't sure if she's having contractions. She states that the pain is sharp and crampy, comes and goes, and has been occurring off and on for 2 days. She also reports occasional mild, frontal headaches. She has not taken anything for pain. Otherwise, she is doing well. Denies nausea/vomiting. Reports good fetal movement. Denies vaginal bleeding and LOF.    /74   Wt 78.7 kg (173 lb 8 oz)   LMP 2017 (Approximate)   BMI 28.87 kg/m²     28 y.o., at 38w5d by Estimated Date of Delivery: 18  Patient Active Problem List   Diagnosis    Nausea and vomiting in pregnancy    Pregnancy, supervision, normal, first    33 weeks gestation of pregnancy     OB History    Para Term  AB Living   1 0 0 0 0 0   SAB TAB Ectopic Multiple Live Births   0 0 0 0 0      # Outcome Date GA Lbr Randell/2nd Weight Sex Delivery Anes PTL Lv   1 Current                   Dating reviewed    Allergies and problem list reviewed and updated    Medical and surgical history reviewed    Prenatal labs reviewed and updated    Physical Exam:  ABD: soft, gravid, nontender  SVE: cl/th/hi, medium consistency, posterior    Assessment:  29yo  at 38w5d who presents for VIPUL visit with complaints of abdominal pain. Otherwise doing well.    Plan:   - 3rd trimester labs reviewed and up to date, GBS+  - Advised that tylenol is safe during pregnancy and take prn pain  - Reviewed labor precautions and kick counts, patient voiced understanding  - Follow up: 1 week    Mikaela Blanco MD  OBGYN, PGY-1

## 2018-09-04 ENCOUNTER — ROUTINE PRENATAL (OUTPATIENT)
Dept: OBSTETRICS AND GYNECOLOGY | Facility: CLINIC | Age: 28
End: 2018-09-04
Payer: MEDICAID

## 2018-09-04 VITALS — BODY MASS INDEX: 29.2 KG/M2 | DIASTOLIC BLOOD PRESSURE: 60 MMHG | WEIGHT: 175.5 LBS | SYSTOLIC BLOOD PRESSURE: 110 MMHG

## 2018-09-04 DIAGNOSIS — Z34.03 ENCOUNTER FOR SUPERVISION OF NORMAL FIRST PREGNANCY IN THIRD TRIMESTER: Primary | ICD-10-CM

## 2018-09-04 PROBLEM — Z3A.33 33 WEEKS GESTATION OF PREGNANCY: Status: RESOLVED | Noted: 2018-07-23 | Resolved: 2018-09-04

## 2018-09-04 PROBLEM — O21.9 NAUSEA AND VOMITING IN PREGNANCY: Status: RESOLVED | Noted: 2018-02-05 | Resolved: 2018-09-04

## 2018-09-04 PROCEDURE — 99999 PR PBB SHADOW E&M-EST. PATIENT-LVL III: CPT | Mod: PBBFAC,,, | Performed by: STUDENT IN AN ORGANIZED HEALTH CARE EDUCATION/TRAINING PROGRAM

## 2018-09-04 PROCEDURE — 99213 OFFICE O/P EST LOW 20 MIN: CPT | Mod: TH,S$PBB,, | Performed by: STUDENT IN AN ORGANIZED HEALTH CARE EDUCATION/TRAINING PROGRAM

## 2018-09-04 PROCEDURE — 99213 OFFICE O/P EST LOW 20 MIN: CPT | Mod: PBBFAC,TH,PO | Performed by: STUDENT IN AN ORGANIZED HEALTH CARE EDUCATION/TRAINING PROGRAM

## 2018-09-04 NOTE — PROGRESS NOTES
Complaints today: none  Good fm.  Denies ctx, vb, lof    /60   Wt 79.6 kg (175 lb 7.8 oz)   LMP 2017 (Approximate)   BMI 29.20 kg/m²     28 y.o., at 39w6d by Estimated Date of Delivery: 18  Patient Active Problem List   Diagnosis    Pregnancy, supervision, normal, first     OB History    Para Term  AB Living   1 0 0 0 0 0   SAB TAB Ectopic Multiple Live Births   0 0 0 0 0      # Outcome Date GA Lbr Randell/2nd Weight Sex Delivery Anes PTL Lv   1 Current                   Dating reviewed    Allergies and problem list reviewed and updated    Medical and surgical history reviewed    Prenatal labs reviewed and updated    Physical Exam:  ABD: soft, gravid, nontender    Assessment:  There are no diagnoses linked to this encounter.    Plan:   1. Routine prenatal visit  - Doing well, no complaints  - Patient states she already received flu shot earlier in the year and does not desire one this year  - Plans for depo for contraception post partum  - Given patient's cervix unfavorable, will schedule induction on  at 2030  - Labor and delivery notified regarding induction    Follow up in 1 Weeks, kick counts, labor precautions

## 2018-09-11 ENCOUNTER — HOSPITAL ENCOUNTER (INPATIENT)
Facility: OTHER | Age: 28
LOS: 4 days | Discharge: HOME | End: 2018-09-15
Attending: OBSTETRICS & GYNECOLOGY | Admitting: OBSTETRICS & GYNECOLOGY
Payer: MEDICAID

## 2018-09-11 ENCOUNTER — ANESTHESIA EVENT (OUTPATIENT)
Dept: OBSTETRICS AND GYNECOLOGY | Facility: OTHER | Age: 28
End: 2018-09-11
Payer: MEDICAID

## 2018-09-11 ENCOUNTER — ANESTHESIA (OUTPATIENT)
Dept: OBSTETRICS AND GYNECOLOGY | Facility: OTHER | Age: 28
End: 2018-09-11
Payer: MEDICAID

## 2018-09-11 DIAGNOSIS — Z3A.41 41 WEEKS GESTATION OF PREGNANCY: ICD-10-CM

## 2018-09-11 DIAGNOSIS — O36.8390 NON-REASSURING FETAL HEART RATE OR RHYTHM AFFECTING MANAGEMENT OF MOTHER: Primary | ICD-10-CM

## 2018-09-11 DIAGNOSIS — O48.0 41 WEEKS GESTATION OF PREGNANCY: ICD-10-CM

## 2018-09-11 LAB
BASOPHILS # BLD AUTO: 0.01 K/UL
BASOPHILS NFR BLD: 0.1 %
DIFFERENTIAL METHOD: ABNORMAL
EOSINOPHIL # BLD AUTO: 0.1 K/UL
EOSINOPHIL NFR BLD: 0.7 %
ERYTHROCYTE [DISTWIDTH] IN BLOOD BY AUTOMATED COUNT: 14.3 %
HCT VFR BLD AUTO: 35.1 %
HGB BLD-MCNC: 11.3 G/DL
LYMPHOCYTES # BLD AUTO: 2 K/UL
LYMPHOCYTES NFR BLD: 24.2 %
MCH RBC QN AUTO: 28.3 PG
MCHC RBC AUTO-ENTMCNC: 32.2 G/DL
MCV RBC AUTO: 88 FL
MONOCYTES # BLD AUTO: 1.2 K/UL
MONOCYTES NFR BLD: 15 %
NEUTROPHILS # BLD AUTO: 4.9 K/UL
NEUTROPHILS NFR BLD: 59.8 %
PLATELET # BLD AUTO: 250 K/UL
PMV BLD AUTO: 9.8 FL
RBC # BLD AUTO: 3.99 M/UL
WBC # BLD AUTO: 8.14 K/UL

## 2018-09-11 PROCEDURE — 11000001 HC ACUTE MED/SURG PRIVATE ROOM

## 2018-09-11 PROCEDURE — 85025 COMPLETE CBC W/AUTO DIFF WBC: CPT

## 2018-09-11 PROCEDURE — 63600175 PHARM REV CODE 636 W HCPCS: Performed by: STUDENT IN AN ORGANIZED HEALTH CARE EDUCATION/TRAINING PROGRAM

## 2018-09-11 PROCEDURE — 25000003 PHARM REV CODE 250: Performed by: STUDENT IN AN ORGANIZED HEALTH CARE EDUCATION/TRAINING PROGRAM

## 2018-09-11 PROCEDURE — 86901 BLOOD TYPING SEROLOGIC RH(D): CPT

## 2018-09-11 RX ORDER — OXYTOCIN/RINGER'S LACTATE 20/1000 ML
20 PLASTIC BAG, INJECTION (ML) INTRAVENOUS ONCE
Status: DISCONTINUED | OUTPATIENT
Start: 2018-09-11 | End: 2018-09-13

## 2018-09-11 RX ORDER — OXYTOCIN/RINGER'S LACTATE 20/1000 ML
333 PLASTIC BAG, INJECTION (ML) INTRAVENOUS CONTINUOUS
Status: ACTIVE | OUTPATIENT
Start: 2018-09-11 | End: 2018-09-11

## 2018-09-11 RX ORDER — OXYTOCIN/RINGER'S LACTATE 20/1000 ML
41.7 PLASTIC BAG, INJECTION (ML) INTRAVENOUS CONTINUOUS
Status: ACTIVE | OUTPATIENT
Start: 2018-09-11 | End: 2018-09-12

## 2018-09-11 RX ORDER — TERBUTALINE SULFATE 1 MG/ML
0.25 INJECTION SUBCUTANEOUS
Status: DISCONTINUED | OUTPATIENT
Start: 2018-09-11 | End: 2018-09-13

## 2018-09-11 RX ORDER — OXYTOCIN/RINGER'S LACTATE 20/1000 ML
41.65 PLASTIC BAG, INJECTION (ML) INTRAVENOUS CONTINUOUS
Status: ACTIVE | OUTPATIENT
Start: 2018-09-11 | End: 2018-09-12

## 2018-09-11 RX ORDER — MISOPROSTOL 200 UG/1
600 TABLET ORAL
Status: DISCONTINUED | OUTPATIENT
Start: 2018-09-11 | End: 2018-09-15 | Stop reason: HOSPADM

## 2018-09-11 RX ORDER — CARBOPROST TROMETHAMINE 250 UG/ML
250 INJECTION, SOLUTION INTRAMUSCULAR
Status: DISCONTINUED | OUTPATIENT
Start: 2018-09-11 | End: 2018-09-15 | Stop reason: HOSPADM

## 2018-09-11 RX ORDER — ONDANSETRON 8 MG/1
8 TABLET, ORALLY DISINTEGRATING ORAL EVERY 8 HOURS PRN
Status: DISCONTINUED | OUTPATIENT
Start: 2018-09-11 | End: 2018-09-13

## 2018-09-11 RX ORDER — METHYLERGONOVINE MALEATE 0.2 MG/ML
200 INJECTION INTRAVENOUS
Status: DISCONTINUED | OUTPATIENT
Start: 2018-09-11 | End: 2018-09-15 | Stop reason: HOSPADM

## 2018-09-11 RX ORDER — SODIUM CHLORIDE, SODIUM LACTATE, POTASSIUM CHLORIDE, CALCIUM CHLORIDE 600; 310; 30; 20 MG/100ML; MG/100ML; MG/100ML; MG/100ML
INJECTION, SOLUTION INTRAVENOUS CONTINUOUS
Status: DISCONTINUED | OUTPATIENT
Start: 2018-09-11 | End: 2018-09-13

## 2018-09-11 RX ORDER — SODIUM CHLORIDE 9 MG/ML
INJECTION, SOLUTION INTRAVENOUS
Status: DISCONTINUED | OUTPATIENT
Start: 2018-09-11 | End: 2018-09-13

## 2018-09-11 RX ADMIN — SODIUM CHLORIDE, SODIUM LACTATE, POTASSIUM CHLORIDE, AND CALCIUM CHLORIDE: .6; .31; .03; .02 INJECTION, SOLUTION INTRAVENOUS at 11:09

## 2018-09-11 RX ADMIN — MISOPROSTOL 50 MCG: 100 TABLET ORAL at 10:09

## 2018-09-11 RX ADMIN — DEXTROSE 5 MILLION UNITS: 50 INJECTION, SOLUTION INTRAVENOUS at 10:09

## 2018-09-12 PROBLEM — O48.0 41 WEEKS GESTATION OF PREGNANCY: Status: ACTIVE | Noted: 2018-09-12

## 2018-09-12 PROBLEM — A49.1 GROUP B STREPTOCOCCAL INFECTION: Status: ACTIVE | Noted: 2018-09-12

## 2018-09-12 PROBLEM — Z3A.41 41 WEEKS GESTATION OF PREGNANCY: Status: ACTIVE | Noted: 2018-09-12

## 2018-09-12 LAB
ABO + RH BLD: NORMAL
BLD GP AB SCN CELLS X3 SERPL QL: NORMAL

## 2018-09-12 PROCEDURE — 11000001 HC ACUTE MED/SURG PRIVATE ROOM

## 2018-09-12 PROCEDURE — 25000003 PHARM REV CODE 250: Performed by: STUDENT IN AN ORGANIZED HEALTH CARE EDUCATION/TRAINING PROGRAM

## 2018-09-12 PROCEDURE — 62326 NJX INTERLAMINAR LMBR/SAC: CPT | Performed by: ANESTHESIOLOGY

## 2018-09-12 PROCEDURE — 37000009 HC ANESTHESIA EA ADD 15 MINS: Performed by: OBSTETRICS & GYNECOLOGY

## 2018-09-12 PROCEDURE — 63600175 PHARM REV CODE 636 W HCPCS: Performed by: STUDENT IN AN ORGANIZED HEALTH CARE EDUCATION/TRAINING PROGRAM

## 2018-09-12 PROCEDURE — 59514 CESAREAN DELIVERY ONLY: CPT | Mod: GB,,, | Performed by: OBSTETRICS & GYNECOLOGY

## 2018-09-12 PROCEDURE — 01968 ANES/ANALG CS DLVR NEURAXIAL: CPT | Mod: AA,,, | Performed by: ANESTHESIOLOGY

## 2018-09-12 PROCEDURE — 27800517 HC TRAY,EPIDURAL-CONTINUOUS: Performed by: ANESTHESIOLOGY

## 2018-09-12 PROCEDURE — S0028 INJECTION, FAMOTIDINE, 20 MG: HCPCS | Performed by: ANESTHESIOLOGY

## 2018-09-12 PROCEDURE — 37000008 HC ANESTHESIA 1ST 15 MINUTES: Performed by: OBSTETRICS & GYNECOLOGY

## 2018-09-12 PROCEDURE — 25000003 PHARM REV CODE 250: Performed by: ANESTHESIOLOGY

## 2018-09-12 PROCEDURE — 27200710 HC EPIDURAL INFUSION PUMP SET: Performed by: ANESTHESIOLOGY

## 2018-09-12 PROCEDURE — 59514 CESAREAN DELIVERY ONLY: CPT | Mod: AA,,, | Performed by: ANESTHESIOLOGY

## 2018-09-12 RX ORDER — OXYTOCIN 10 [USP'U]/ML
INJECTION, SOLUTION INTRAMUSCULAR; INTRAVENOUS
Status: DISCONTINUED | OUTPATIENT
Start: 2018-09-12 | End: 2018-09-13

## 2018-09-12 RX ORDER — OXYTOCIN/RINGER'S LACTATE 20/1000 ML
333 PLASTIC BAG, INJECTION (ML) INTRAVENOUS CONTINUOUS
Status: ACTIVE | OUTPATIENT
Start: 2018-09-12 | End: 2018-09-12

## 2018-09-12 RX ORDER — PHENYLEPHRINE HYDROCHLORIDE 10 MG/ML
INJECTION INTRAVENOUS
Status: DISCONTINUED | OUTPATIENT
Start: 2018-09-12 | End: 2018-09-13

## 2018-09-12 RX ORDER — MISOPROSTOL 200 UG/1
800 TABLET ORAL
Status: DISCONTINUED | OUTPATIENT
Start: 2018-09-12 | End: 2018-09-15 | Stop reason: HOSPADM

## 2018-09-12 RX ORDER — SODIUM CHLORIDE, SODIUM LACTATE, POTASSIUM CHLORIDE, CALCIUM CHLORIDE 600; 310; 30; 20 MG/100ML; MG/100ML; MG/100ML; MG/100ML
INJECTION, SOLUTION INTRAVENOUS CONTINUOUS
Status: DISCONTINUED | OUTPATIENT
Start: 2018-09-12 | End: 2018-09-13

## 2018-09-12 RX ORDER — FENTANYL CITRATE 50 UG/ML
INJECTION, SOLUTION INTRAMUSCULAR; INTRAVENOUS
Status: DISCONTINUED | OUTPATIENT
Start: 2018-09-12 | End: 2018-09-13

## 2018-09-12 RX ORDER — MIDAZOLAM HYDROCHLORIDE 1 MG/ML
INJECTION INTRAMUSCULAR; INTRAVENOUS
Status: DISCONTINUED | OUTPATIENT
Start: 2018-09-12 | End: 2018-09-13

## 2018-09-12 RX ORDER — FAMOTIDINE 10 MG/ML
20 INJECTION INTRAVENOUS ONCE
Status: COMPLETED | OUTPATIENT
Start: 2018-09-12 | End: 2018-09-12

## 2018-09-12 RX ORDER — CEFAZOLIN SODIUM 2 G/50ML
2 SOLUTION INTRAVENOUS
Status: DISCONTINUED | OUTPATIENT
Start: 2018-09-12 | End: 2018-09-13

## 2018-09-12 RX ORDER — SODIUM CITRATE AND CITRIC ACID MONOHYDRATE 334; 500 MG/5ML; MG/5ML
30 SOLUTION ORAL
Status: DISCONTINUED | OUTPATIENT
Start: 2018-09-12 | End: 2018-09-13

## 2018-09-12 RX ORDER — FENTANYL/BUPIVACAINE/NS/PF 2MCG/ML-.1
PLASTIC BAG, INJECTION (ML) INJECTION CONTINUOUS
Status: DISCONTINUED | OUTPATIENT
Start: 2018-09-12 | End: 2018-09-13

## 2018-09-12 RX ORDER — METHYLERGONOVINE MALEATE 0.2 MG/ML
200 INJECTION INTRAVENOUS
Status: DISCONTINUED | OUTPATIENT
Start: 2018-09-12 | End: 2018-09-15 | Stop reason: HOSPADM

## 2018-09-12 RX ORDER — MUPIROCIN 20 MG/G
OINTMENT TOPICAL
Status: CANCELLED | OUTPATIENT
Start: 2018-09-12

## 2018-09-12 RX ORDER — FAMOTIDINE 10 MG/ML
20 INJECTION INTRAVENOUS
Status: CANCELLED | OUTPATIENT
Start: 2018-09-12

## 2018-09-12 RX ORDER — FENTANYL/BUPIVACAINE/NS/PF 2MCG/ML-.1
PLASTIC BAG, INJECTION (ML) INJECTION
Status: DISPENSED
Start: 2018-09-12 | End: 2018-09-13

## 2018-09-12 RX ORDER — SODIUM CITRATE AND CITRIC ACID MONOHYDRATE 334; 500 MG/5ML; MG/5ML
30 SOLUTION ORAL ONCE
Status: COMPLETED | OUTPATIENT
Start: 2018-09-12 | End: 2018-09-12

## 2018-09-12 RX ORDER — FENTANYL/BUPIVACAINE/NS/PF 2MCG/ML-.1
PLASTIC BAG, INJECTION (ML) INJECTION CONTINUOUS PRN
Status: DISCONTINUED | OUTPATIENT
Start: 2018-09-12 | End: 2018-09-13

## 2018-09-12 RX ORDER — LIDOCAINE HCL/EPINEPHRINE/PF 2%-1:200K
VIAL (ML) INJECTION
Status: DISCONTINUED | OUTPATIENT
Start: 2018-09-12 | End: 2018-09-13

## 2018-09-12 RX ORDER — KETOROLAC TROMETHAMINE 30 MG/ML
30 INJECTION, SOLUTION INTRAMUSCULAR; INTRAVENOUS EVERY 6 HOURS
Status: COMPLETED | OUTPATIENT
Start: 2018-09-13 | End: 2018-09-13

## 2018-09-12 RX ORDER — CARBOPROST TROMETHAMINE 250 UG/ML
250 INJECTION, SOLUTION INTRAMUSCULAR
Status: DISCONTINUED | OUTPATIENT
Start: 2018-09-12 | End: 2018-09-13

## 2018-09-12 RX ORDER — OXYTOCIN/RINGER'S LACTATE 20/1000 ML
2 PLASTIC BAG, INJECTION (ML) INTRAVENOUS CONTINUOUS
Status: DISCONTINUED | OUTPATIENT
Start: 2018-09-12 | End: 2018-09-13

## 2018-09-12 RX ORDER — SODIUM CHLORIDE, SODIUM LACTATE, POTASSIUM CHLORIDE, CALCIUM CHLORIDE 600; 310; 30; 20 MG/100ML; MG/100ML; MG/100ML; MG/100ML
INJECTION, SOLUTION INTRAVENOUS CONTINUOUS PRN
Status: DISCONTINUED | OUTPATIENT
Start: 2018-09-12 | End: 2018-09-13

## 2018-09-12 RX ORDER — OXYTOCIN/RINGER'S LACTATE 20/1000 ML
41.7 PLASTIC BAG, INJECTION (ML) INTRAVENOUS CONTINUOUS
Status: DISCONTINUED | OUTPATIENT
Start: 2018-09-12 | End: 2018-09-13

## 2018-09-12 RX ORDER — CEFAZOLIN SODIUM 1 G/3ML
INJECTION, POWDER, FOR SOLUTION INTRAMUSCULAR; INTRAVENOUS
Status: DISCONTINUED | OUTPATIENT
Start: 2018-09-12 | End: 2018-09-13

## 2018-09-12 RX ORDER — ONDANSETRON HYDROCHLORIDE 2 MG/ML
INJECTION, SOLUTION INTRAMUSCULAR; INTRAVENOUS
Status: DISCONTINUED | OUTPATIENT
Start: 2018-09-12 | End: 2018-09-13

## 2018-09-12 RX ORDER — OXYTOCIN/RINGER'S LACTATE 20/1000 ML
10 PLASTIC BAG, INJECTION (ML) INTRAVENOUS ONCE
Status: DISCONTINUED | OUTPATIENT
Start: 2018-09-12 | End: 2018-09-13

## 2018-09-12 RX ORDER — FENTANYL/BUPIVACAINE/NS/PF 2MCG/ML-.1
PLASTIC BAG, INJECTION (ML) INJECTION
Status: DISPENSED
Start: 2018-09-12 | End: 2018-09-12

## 2018-09-12 RX ADMIN — FAMOTIDINE 20 MG: 10 INJECTION, SOLUTION INTRAVENOUS at 10:09

## 2018-09-12 RX ADMIN — DEXTROSE 2.5 MILLION UNITS: 50 INJECTION, SOLUTION INTRAVENOUS at 11:09

## 2018-09-12 RX ADMIN — OXYTOCIN 3 UNITS: 10 INJECTION, SOLUTION INTRAMUSCULAR; INTRAVENOUS at 11:09

## 2018-09-12 RX ADMIN — LIDOCAINE HYDROCHLORIDE,EPINEPHRINE BITARTRATE 5 ML: 20; .005 INJECTION, SOLUTION EPIDURAL; INFILTRATION; INTRACAUDAL; PERINEURAL at 11:09

## 2018-09-12 RX ADMIN — SODIUM CITRATE AND CITRIC ACID MONOHYDRATE 30 ML: 500; 334 SOLUTION ORAL at 10:09

## 2018-09-12 RX ADMIN — LIDOCAINE HYDROCHLORIDE,EPINEPHRINE BITARTRATE 2 ML: 20; .005 INJECTION, SOLUTION EPIDURAL; INFILTRATION; INTRACAUDAL; PERINEURAL at 11:09

## 2018-09-12 RX ADMIN — AZITHROMYCIN MONOHYDRATE 500 MG: 500 INJECTION, POWDER, LYOPHILIZED, FOR SOLUTION INTRAVENOUS at 11:09

## 2018-09-12 RX ADMIN — FENTANYL CITRATE 100 MCG: 50 INJECTION, SOLUTION INTRAMUSCULAR; INTRAVENOUS at 11:09

## 2018-09-12 RX ADMIN — DEXTROSE 2.5 MILLION UNITS: 50 INJECTION, SOLUTION INTRAVENOUS at 03:09

## 2018-09-12 RX ADMIN — PHENYLEPHRINE HYDROCHLORIDE 100 MCG: 10 INJECTION INTRAVENOUS at 11:09

## 2018-09-12 RX ADMIN — DEXTROSE 2.5 MILLION UNITS: 50 INJECTION, SOLUTION INTRAVENOUS at 07:09

## 2018-09-12 RX ADMIN — CEFAZOLIN 2 G: 330 INJECTION, POWDER, FOR SOLUTION INTRAMUSCULAR; INTRAVENOUS at 11:09

## 2018-09-12 RX ADMIN — ONDANSETRON 4 MG: 2 INJECTION, SOLUTION INTRAMUSCULAR; INTRAVENOUS at 11:09

## 2018-09-12 RX ADMIN — FENTANYL CITRATE 100 MCG: 50 INJECTION, SOLUTION INTRAMUSCULAR; INTRAVENOUS at 04:09

## 2018-09-12 RX ADMIN — SODIUM CHLORIDE, SODIUM LACTATE, POTASSIUM CHLORIDE, AND CALCIUM CHLORIDE: .6; .31; .03; .02 INJECTION, SOLUTION INTRAVENOUS at 02:09

## 2018-09-12 RX ADMIN — SODIUM CHLORIDE, SODIUM LACTATE, POTASSIUM CHLORIDE, AND CALCIUM CHLORIDE: 600; 310; 30; 20 INJECTION, SOLUTION INTRAVENOUS at 11:09

## 2018-09-12 RX ADMIN — DEXTROSE 2.5 MILLION UNITS: 50 INJECTION, SOLUTION INTRAVENOUS at 06:09

## 2018-09-12 RX ADMIN — Medication 10 ML/HR: at 02:09

## 2018-09-12 RX ADMIN — MIDAZOLAM HYDROCHLORIDE 2 MG: 1 INJECTION, SOLUTION INTRAMUSCULAR; INTRAVENOUS at 11:09

## 2018-09-12 RX ADMIN — DEXTROSE 2.5 MILLION UNITS: 50 INJECTION, SOLUTION INTRAVENOUS at 02:09

## 2018-09-12 RX ADMIN — Medication 2 MILLI-UNITS/MIN: at 04:09

## 2018-09-12 RX ADMIN — LIDOCAINE HYDROCHLORIDE,EPINEPHRINE BITARTRATE 3 ML: 20; .005 INJECTION, SOLUTION EPIDURAL; INFILTRATION; INTRACAUDAL; PERINEURAL at 11:09

## 2018-09-12 NOTE — PROGRESS NOTES
LABOR PROGRESS NOTE    S:  MD to bedside for cervical check. Complaints: No. Epidural in place, working.      O: Temp:  [96.5 °F (35.8 °C)-97.6 °F (36.4 °C)] 97.6 °F (36.4 °C)  Pulse:  [56-88] 70  Resp:  [16] 16  SpO2:  [97 %-100 %] 100 %  BP: (108-138)/(68-89) 113/76      FHT: 115BPM/Mod beat to beat variability/+accels/occasional decels, overall reassuring  CTX: q 3 minutes  SVE: 2/50/-3        ASSESSMENT:   28 y.o.  at 41w0d, IOL    Active Hospital Problems    Diagnosis  POA    *41 weeks gestation of pregnancy [Z3A.41]  Unknown    Group B streptococcal infection [A49.1]  Unknown    Indication for care in labor or delivery [O75.9]  Yes      Resolved Hospital Problems   No resolved problems to display.     2130: 0.5/-3  0230: 1/50/-3  0330: Cook balloon placed, pitocin started  0515: 2/50/-3    PLAN:    Labor management  Continue Close Maternal/Fetal Monitoring.   Continue pitocin per protocol   Recheck in 2 hours or PRN      Katja Vargas MD

## 2018-09-12 NOTE — H&P
[facility]  Obstetrics  History & Physical    Patient Name: Ken Bills  MRN: [id number]  Admission Date: 2018  Primary Care Provider: Primary Doctor No    Subjective:     Principal Problem:41 weeks gestation of pregnancy    History of Present Illness:  Ken Bills is a 28 y.o.  at 40w6d who presents to L&D for a scheduled induction for post-term dates. On admission, she denies any obstetric complaints including contractions, vaginal bleeding or leaking fluids and states that fetal movements are active.    This IUP is complicated by GBS + Status.     Obstetric HPI:    Obstetric History       T0      L0     SAB0   TAB0   Ectopic0   Multiple0   Live Births0       # Outcome Date GA Lbr Randell/2nd Weight Sex Delivery Anes PTL Lv   1 Current                 Past Medical History:   Diagnosis Date    Allergic      Past Surgical History:   Procedure Laterality Date    NO PAST SURGERIES         PTA Medications   Medication Sig    ondansetron (ZOFRAN) 4 MG tablet Take 1 tablet (4 mg total) by mouth every 8 (eight) hours as needed for Nausea.    ondansetron (ZOFRAN-ODT) 4 MG TbDL Take 1 tablet (4 mg total) by mouth every 8 (eight) hours as needed.    prenatal 21-iron fu-folic acid (PRENATAL COMPLETE) 14 mg iron- 400 mcg Tab Take 1 tablet by mouth once daily.       Review of patient's allergies indicates:  No Known Allergies     Family History     None        Tobacco Use    Smoking status: Never Smoker    Smokeless tobacco: Never Used   Substance and Sexual Activity    Alcohol use: No     Comment: rarely    Drug use: No    Sexual activity: Yes     Partners: Male     Birth control/protection: None     Review of Systems   Constitutional: Negative for chills and fever.   Eyes: Negative for visual disturbance.   Respiratory: Negative for shortness of breath.    Cardiovascular: Negative for chest pain.   Gastrointestinal: Negative for diarrhea, nausea and  vomiting.   Genitourinary: Negative for dysuria, hematuria, vaginal bleeding and vaginal discharge.   Skin:  Negative for rash.   Neurological: Negative for headaches.   Hematological: Does not bruise/bleed easily.   Psychiatric/Behavioral: Negative.  The patient is not nervous/anxious.       Objective:     Vital Signs (Most Recent):  Temp: 96.5 °F (35.8 °C) (09/12/18 0026)  Pulse: 60 (09/12/18 0336)  Resp: 16 (09/12/18 0026)  BP: 119/75 (09/12/18 0336)  SpO2: 100 % (09/12/18 0334) Vital Signs (24h Range):  Temp:  [96.5 °F (35.8 °C)-97.3 °F (36.3 °C)] 96.5 °F (35.8 °C)  Pulse:  [58-88] 60  Resp:  [16] 16  SpO2:  [97 %-100 %] 100 %  BP: (108-138)/(72-89) 119/75        There is no height or weight on file to calculate BMI.    FHT: 125, moderate variability, accels present, no decells, Category 1 - Reassuring  TOCO: Minimal Contraction Activity    Physical Exam:   Constitutional: She is oriented to person, place, and time. She appears well-developed and well-nourished. No distress.    HENT:   Head: Normocephalic and atraumatic.    Eyes: Conjunctivae are normal.    Neck: Neck supple.    Cardiovascular: Normal rate, regular rhythm and intact distal pulses.     Pulmonary/Chest: Effort normal. No respiratory distress.        Abdominal: Soft. She exhibits no distension and no abdominal incision. There is no tenderness. There is no rebound and no guarding.     Genitourinary: Vagina normal.               Neurological: She is alert and oriented to person, place, and time.    Skin: Skin is warm and dry. She is not diaphoretic.    Psychiatric: She has a normal mood and affect. Her behavior is normal. Judgment and thought content normal.       Cervix:  Dilation:  0.5  Effacement:  50%  Station: -3  Presentation: Vertex     Significant Labs:  Lab Results   Component Value Date    GROUPTRH O POS 09/11/2018    HEPBSAG Negative 02/08/2018    STREPBCULT  08/06/2018     STREPTOCOCCUS AGALACTIAE (GROUP B)  Beta-hemolytic streptococci  are routinely susceptible to   penicillins,cephalosporins and carbapenems.         I have personallly reviewed all pertinent lab results from the last 24 hours.    Assessment/Plan:     28 y.o. female  at 41w0d for:    * 41 weeks gestation of pregnancy    - Consents signed and to chart  - Admit to Labor and Delivery unit  - Plan for induction is Cytotec > Pitocin when clinically appropriate  - Epidural per Anesthesia  - Draw CBC, T&S  - Notify Staff - [facility] Staff aware  - Ultrasound performed, fetus in vertex position.   - Recheck in 4 hrs or PRN  - Post-Partum Hemorrhage risk - low        Group B streptococcal infection    - Penicillin for GBS Prophylaxis            Shivam Ruiz MD  Obstetrics  [facility]    Patient presented to the labor and delivery unit for a scheduled induction.

## 2018-09-12 NOTE — PROGRESS NOTES
"LABOR NOTE    S:  Complaints: No.  Epidural working:  yes      O: /74   Pulse 84   Temp 96.3 °F (35.7 °C) (Temporal)   Resp 16   Ht 5' 6" (1.676 m)   Wt 80.7 kg (178 lb)   LMP 2017 (Approximate)   SpO2 100%   Breastfeeding? No   BMI 28.73 kg/m²       FHT: 120bpm, mod btbv,+accels, +1 spontaneous decel to 70bpm for 1 minutes which resolved with maternal repositioning, Cat 1 (reassuring)  CTX: q 3 minutes  SVE: 2/70/-3      ASSESSMENT:   28 y.o.  at 41w0d, IOL for postdates    FHT reassuring    Active Hospital Problems    Diagnosis  POA    *41 weeks gestation of pregnancy [Z3A.41]  Unknown    Group B streptococcal infection [A49.1]  Unknown    Indication for care in labor or delivery [O75.9]  Yes      Resolved Hospital Problems   No resolved problems to display.         PLAN:    Continue Close Maternal/Fetal Monitoring  Pitocin Augmentation per protocol  Recheck 2 hours or PRN      Timeline:  : 0.53  0230: 1/50/-3  0330: Cook balloon placed, pitocin started  0515: 2/50/-3  0900: 2/70/-3, Balloon in place. Pit at 8mU/min.         "

## 2018-09-12 NOTE — HPI
Ken Bills is a 28 y.o.  at 40w6d who presents to L&D for a scheduled induction for post-term dates. On admission, she denies any obstetric complaints including contractions, vaginal bleeding or leaking fluids and states that fetal movements are active.    This IUP is complicated by GBS + Status.

## 2018-09-12 NOTE — SUBJECTIVE & OBJECTIVE
Obstetric HPI:    Obstetric History       T0      L0     SAB0   TAB0   Ectopic0   Multiple0   Live Births0       # Outcome Date GA Lbr Randell/2nd Weight Sex Delivery Anes PTL Lv   1 Current                 Past Medical History:   Diagnosis Date    Allergic      Past Surgical History:   Procedure Laterality Date    NO PAST SURGERIES         PTA Medications   Medication Sig    ondansetron (ZOFRAN) 4 MG tablet Take 1 tablet (4 mg total) by mouth every 8 (eight) hours as needed for Nausea.    ondansetron (ZOFRAN-ODT) 4 MG TbDL Take 1 tablet (4 mg total) by mouth every 8 (eight) hours as needed.    prenatal 21-iron fu-folic acid (PRENATAL COMPLETE) 14 mg iron- 400 mcg Tab Take 1 tablet by mouth once daily.       Review of patient's allergies indicates:  No Known Allergies     Family History     None        Tobacco Use    Smoking status: Never Smoker    Smokeless tobacco: Never Used   Substance and Sexual Activity    Alcohol use: No     Comment: rarely    Drug use: No    Sexual activity: Yes     Partners: Male     Birth control/protection: None     Review of Systems   Constitutional: Negative for chills and fever.   Eyes: Negative for visual disturbance.   Respiratory: Negative for shortness of breath.    Cardiovascular: Negative for chest pain.   Gastrointestinal: Negative for diarrhea, nausea and vomiting.   Genitourinary: Negative for dysuria, hematuria, vaginal bleeding and vaginal discharge.   Skin:  Negative for rash.   Neurological: Negative for headaches.   Hematological: Does not bruise/bleed easily.   Psychiatric/Behavioral: Negative.  The patient is not nervous/anxious.       Objective:     Vital Signs (Most Recent):  Temp: 96.5 °F (35.8 °C) (18 0026)  Pulse: 60 (18 0336)  Resp: 16 (18 0026)  BP: 119/75 (18 0336)  SpO2: 100 % (18 0334) Vital Signs (24h Range):  Temp:  [96.5 °F (35.8 °C)-97.3 °F (36.3 °C)] 96.5 °F (35.8 °C)  Pulse:  [58-88] 60  Resp:  [16]  16  SpO2:  [97 %-100 %] 100 %  BP: (108-138)/(72-89) 119/75        There is no height or weight on file to calculate BMI.    FHT: 125, moderate variability, accels present, no decells, Category 1 - Reassuring  TOCO: Minimal Contraction Activity    Physical Exam:   Constitutional: She is oriented to person, place, and time. She appears well-developed and well-nourished. No distress.    HENT:   Head: Normocephalic and atraumatic.    Eyes: Conjunctivae are normal.    Neck: Neck supple.    Cardiovascular: Normal rate, regular rhythm and intact distal pulses.     Pulmonary/Chest: Effort normal. No respiratory distress.        Abdominal: Soft. She exhibits no distension and no abdominal incision. There is no tenderness. There is no rebound and no guarding.     Genitourinary: Vagina normal.               Neurological: She is alert and oriented to person, place, and time.    Skin: Skin is warm and dry. She is not diaphoretic.    Psychiatric: She has a normal mood and affect. Her behavior is normal. Judgment and thought content normal.       Cervix:  Dilation:  0.5  Effacement:  50%  Station: -3  Presentation: Vertex     Significant Labs:  Lab Results   Component Value Date    GROUPTRH O POS 09/11/2018    HEPBSAG Negative 02/08/2018    STREPBCULT  08/06/2018     STREPTOCOCCUS AGALACTIAE (GROUP B)  Beta-hemolytic streptococci are routinely susceptible to   penicillins,cephalosporins and carbapenems.         I have personallly reviewed all pertinent lab results from the last 24 hours.

## 2018-09-12 NOTE — PROGRESS NOTES
"LABOR NOTE    S:  Complaints: No.  Epidural working:  yes  MD to bedside for routine cervical check.    O: /66   Pulse 64   Temp 98 °F (36.7 °C) (Axillary)   Resp 16   Ht 5' 6" (1.676 m)   Wt 80.7 kg (178 lb)   LMP 2017 (Approximate)   SpO2 100%   Breastfeeding? No   BMI 28.73 kg/m²       FHT: 115bpm, mod btbv, +accels, Cat 1(reassuring)  CTX: q 2-4 minutes  SVE: /-2      ASSESSMENT:   28 y.o.  at 41w0d, IOL, doing well, reassuring fetal status    FHT reassuring    Active Hospital Problems    Diagnosis  POA    *41 weeks gestation of pregnancy [Z3A.41]  Unknown    Group B streptococcal infection [A49.1]  Unknown    Indication for care in labor or delivery [O75.9]  Yes      Resolved Hospital Problems   No resolved problems to display.         PLAN:    Continue Close Maternal/Fetal Monitoring  Pitocin Augmentation per protocol  Recheck 2 hours or PRN    Timeline:  2130: 0.550/-3  0230: 50/-3  0330: Cook balloon placed, pitocin started  0515: 50/-3  0900: /-3, Balloon in place. Pit at 8mU/min.   1130: 3/70/-3, cook in place, pit at 8mU/min  1240: 80/-2, cook balloon removed, AROM. Pit at 8 ,will decrease to 4 due to recurrent late decels and continue to monitor.   1430: 80/-2, pit at 4mu/min    Mikaela Blanco MD  OBGYN, PGY-1      "

## 2018-09-12 NOTE — ASSESSMENT & PLAN NOTE
- Consents signed and to chart  - Admit to Labor and Delivery unit  - Plan for induction is Cytotec > Pitocin when clinically appropriate  - Epidural per Anesthesia  - Draw CBC, T&S  - Notify Staff - [facility] Staff aware  - Ultrasound performed, fetus in vertex position.   - Recheck in 4 hrs or PRN  - Post-Partum Hemorrhage risk - low

## 2018-09-12 NOTE — ANESTHESIA PREPROCEDURE EVALUATION
2018  Ken Bills is a 28 y.o., female presenting for induction of labor.      OB History    Para Term  AB Living   1 0 0 0 0 0   SAB TAB Ectopic Multiple Live Births   0 0 0 0 0      # Outcome Date GA Lbr Randell/2nd Weight Sex Delivery Anes PTL Lv   1 Current                   Wt Readings from Last 1 Encounters:   18 0855 79.6 kg (175 lb 7.8 oz)       BP Readings from Last 3 Encounters:   18 110/60   18 106/74   18 116/74       Patient Active Problem List   Diagnosis    Pregnancy, supervision, normal, first    Indication for care in labor or delivery       Past Surgical History:   Procedure Laterality Date    NO PAST SURGERIES         Social History     Socioeconomic History    Marital status: Single     Spouse name: Not on file    Number of children: Not on file    Years of education: Not on file    Highest education level: Not on file   Social Needs    Financial resource strain: Not on file    Food insecurity - worry: Not on file    Food insecurity - inability: Not on file    Transportation needs - medical: Not on file    Transportation needs - non-medical: Not on file   Occupational History    Not on file   Tobacco Use    Smoking status: Never Smoker    Smokeless tobacco: Never Used   Substance and Sexual Activity    Alcohol use: No     Comment: rarely    Drug use: No    Sexual activity: Yes     Partners: Male     Birth control/protection: None   Other Topics Concern    Not on file   Social History Narrative    Not on file         Chemistry    No results found for: NA, K, CL, CO2, BUN, CREATININE, GLU No results found for: CALCIUM, ALKPHOS, AST, ALT, BILITOT, ESTGFRAFRICA, EGFRNONAA         Lab Results   Component Value Date    WBC 7.16 2018    HGB 12.0 2018    HCT 36.8 (L) 2018    MCV 90 2018      2018       No results for input(s): PT, INR, PROTIME, APTT in the last 72 hours.                Anesthesia Evaluation    I have reviewed the Patient Summary Reports.    I have reviewed the Nursing Notes.   I have reviewed the Medications.     Review of Systems  Anesthesia Hx:  No previous Anesthesia  Neg history of prior surgery. Denies Family Hx of Anesthesia complications.   Denies Personal Hx of Anesthesia complications.   Cardiovascular:   Exercise tolerance: good    OB/GYN/PEDS:  Planned Vaginal Delivery   1  , Para 0        Physical Exam  General:  Well nourished    Airway/Jaw/Neck:  Airway Findings: Mouth Opening: Normal Tongue: Normal  General Airway Assessment: Adult  Mallampati: II  Improves to II with phonation.  TM Distance: Normal, at least 6 cm  Jaw/Neck Findings:  Neck ROM: Normal ROM      Dental:  Dental Findings: In tact   Chest/Lungs:  Chest/Lungs Findings: Clear to auscultation, Normal Respiratory Rate     Heart/Vascular:  Heart Findings: Rate: Normal  Rhythm: Regular Rhythm  Sounds: Normal        Mental Status:  Mental Status Findings:  Cooperative, Alert and Oriented         Anesthesia Plan  Type of Anesthesia, risks & benefits discussed:  Anesthesia Type:  CSE, epidural, general, spinal  Patient's Preference:   Intra-op Monitoring Plan:   Intra-op Monitoring Plan Comments:   Post Op Pain Control Plan: multimodal analgesia  Post Op Pain Control Plan Comments:   Induction:   IV  Beta Blocker:         Informed Consent: Patient understands risks and agrees with Anesthesia plan.  Questions answered. Anesthesia consent signed with patient.  ASA Score: 2     Day of Surgery Review of History & Physical:    H&P update referred to the surgeon.         Ready For Surgery From Anesthesia Perspective.

## 2018-09-12 NOTE — PROGRESS NOTES
"LABOR NOTE    S:  Complaints: No.  Epidural working:  yes      O: /74   Pulse 84   Temp 96.3 °F (35.7 °C) (Temporal)   Resp 16   Ht 5' 6" (1.676 m)   Wt 80.7 kg (178 lb)   LMP 2017 (Approximate)   SpO2 100%   Breastfeeding? No   BMI 28.73 kg/m²       FHT: 120 Cat 1, mod BTBV, +accels (reassuring)  CTX: q 3 minutes  SVE: 3/70/-3, cook in place      ASSESSMENT:   28 y.o.  at 41w0d, IOL for post dates    FHT reassuring    Active Hospital Problems    Diagnosis  POA    *41 weeks gestation of pregnancy [Z3A.41]  Unknown    Group B streptococcal infection [A49.1]  Unknown    Indication for care in labor or delivery [O75.9]  Yes      Resolved Hospital Problems   No resolved problems to display.         PLAN:    Continue Close Maternal/Fetal Monitoring  Pitocin Augmentation per protocol  Recheck 2 hours or PRN    Timeline:  2130: 0.5/-3  0230: -3  0330: Cook balloon placed, pitocin started  0515: -3  0900: -3, Balloon in place. Pit at 8mU/min.   1130: 3/70/-3, cook in place, pit at 8mU/min      Adalgisa Harding MD   OBGYN, PGY-1        "

## 2018-09-12 NOTE — PROGRESS NOTES
LABOR PROGRESS NOTE    S:  MD to bedside for cervical check. Complaints: No.      O: Temp:  [96.5 °F (35.8 °C)-97.3 °F (36.3 °C)] 96.5 °F (35.8 °C)  Pulse:  [60-83] 60  Resp:  [16] 16  SpO2:  [100 %] 100 %  BP: (116-124)/(72-79) 120/77      FHT: 130BPM/Mod beat to beat variability/+accels/-decels Cat 1 (reassuring)  CTX: q 3 minutes  SVE: 3        ASSESSMENT:   28 y.o.  at 41w0d, IOL    Active Hospital Problems    Diagnosis  POA    Indication for care in labor or delivery [O75.9]  Yes      Resolved Hospital Problems   No resolved problems to display.     2130 0.550/-3  0230 3    PLAN:    Labor management  Continue Close Maternal/Fetal Monitoring.   Shi too much for cytotec, will place balloon and start pitocin  Patient would like epidural first       Katja Vargas MD

## 2018-09-12 NOTE — PROGRESS NOTES
To bedside for cook balloon placement. Epidural working, patient comfortable    Cervix 1/50/-3  Cook balloon placed without difficulty    FHTs   130 bpm, Mod BTBV, + Acc, intermittent decelerations resolved with maternal repositioning, Overall reactive/reassuring  [place]: q2-3 minutes      - Cook placed, will start pitocin as FHTs continue to remain reassuring     Katja Vargas MD  Ob/Gyn PGY-3

## 2018-09-12 NOTE — PROGRESS NOTES
"LABOR NOTE    S:  Complaints: No.  Epidural working:  Now working, anesthesia on way out of room after adjusting the epidural  MD to bedside for routine cervical check.    O: /75   Pulse 73   Temp 98 °F (36.7 °C) (Axillary)   Resp 16   Ht 5' 6" (1.676 m)   Wt 80.7 kg (178 lb)   LMP 2017 (Approximate)   SpO2 100%   Breastfeeding? No   BMI 28.73 kg/m²       FHT: 115bpm, mod btbv, +accels, Cat 1(reassuring)  CTX: q 2-3 minutes  SVE: 4/80/-2, IUPC placed, pit at 5mu/min      ASSESSMENT:   28 y.o.  at 41w0d, IOL, doing well, reassuring fetal status    FHT reassuring    Active Hospital Problems    Diagnosis  POA    *41 weeks gestation of pregnancy [Z3A.41]  Unknown    Group B streptococcal infection [A49.1]  Unknown    Indication for care in labor or delivery [O75.9]  Yes      Resolved Hospital Problems   No resolved problems to display.     PLAN:    Continue Close Maternal/Fetal Monitoring  Pitocin Augmentation per protocol  Recheck 2 hours or PRN      Timeline:  2130: 0.5/50/-3  0230: 1/50/-3  0330: Cook balloon placed, pitocin started  0515: 2/50/-3  0900: 2/70/-3, Balloon in place. Pit at 8mU/min.   1130: 3/70/-3, cook in place, pit at 8mU/min  1240: 4/80/-2, cook balloon removed, AROM. Pit at 8 ,will decrease to 4 due to recurrent late decels and continue to monitor.   1430: 4/80/-2, pit at 4mu/min  1630: 4/80/-2, IUPC placed, pit at 5mu/min      Mikaela Blanco MD  OBGYN, PGY-1      "

## 2018-09-12 NOTE — ANESTHESIA PROCEDURE NOTES
Epidural    Patient location during procedure: OB   Reason for block: primary anesthetic   Diagnosis: iup   Start time: 9/12/2018 4:10 PM  Timeout: 9/12/2018 4:10 PM  End time: 9/12/2018 2:25 PM  Staffing  Anesthesiologist: Madeleine Chen MD  Resident/CRNA: Ady Feldman MD  Performed: resident/CRNA   Preanesthetic Checklist  Completed: patient identified, site marked, surgical consent, pre-op evaluation, timeout performed, IV checked, risks and benefits discussed, monitors and equipment checked, anesthesia consent given, hand hygiene performed and patient being monitored  Preparation  Patient position: sitting  Prep: ChloraPrep  Patient monitoring: Pulse Ox, continuous capnometry and Blood Pressure  Epidural  Skin Anesthetic: lidocaine 1%  Skin Wheal: 3 mL  Administration type: continuous  Approach: midline  Interspace: L2-3  Injection technique: TERE saline  Needle and Epidural Catheter  Needle type: Tuohy   Needle gauge: 17  Needle length: 3.5 inches  Needle insertion depth: 5.5 cm  Catheter type: springwound and multi-orifice  Catheter size: 19 G  Catheter at skin depth: 10 cm  Test dose: 3 mL of lidocaine 1.5% with Epi 1-to-200,000  Additional Documentation: incremental injection, negative aspiration for heme and CSF, no paresthesia on injection, no signs/symptoms of IV or SA injection, no significant complaints from patient and no significant pain on injection  Needle localization: anatomical landmarks

## 2018-09-12 NOTE — NURSING
Pt was educated about the benefits of breastfeeding for mother and baby, pt is willing to try breastfeeding.

## 2018-09-12 NOTE — ANESTHESIA PROCEDURE NOTES
Epidural    Patient location during procedure: OB   Reason for block: primary anesthetic   Diagnosis: iup in labor   Start time: 9/12/2018 2:20 AM  Timeout: 9/12/2018 2:19 AM  End time: 9/12/2018 2:29 AM  Staffing  Anesthesiologist: Argenis Benedict MD  Resident/CRNA: Zion Fajardo MD  Performed: resident/CRNA   Preanesthetic Checklist  Completed: patient identified, site marked, surgical consent, pre-op evaluation, timeout performed, IV checked, risks and benefits discussed, monitors and equipment checked, anesthesia consent given, hand hygiene performed and patient being monitored  Preparation  Patient position: sitting  Prep: ChloraPrep  Patient monitoring: Pulse Ox and Blood Pressure  Epidural  Skin Anesthetic: lidocaine 1%  Skin Wheal: 3 mL  Administration type: continuous  Approach: midline  Interspace: L3-4  Injection technique: TERE saline  Needle and Epidural Catheter  Needle type: Tuohy   Needle gauge: 17  Needle length: 3.5 inches  Needle insertion depth: 5.5 cm  Catheter type: springwound  Catheter size: 18 G  Catheter at skin depth: 10 cm  Test dose: 3 mL of lidocaine 1.5% with Epi 1-to-200,000  Additional Documentation: incremental injection, negative aspiration for heme and CSF, no paresthesia on injection, no signs/symptoms of IV or SA injection, no significant pain on injection and no significant complaints from patient  Needle localization: anatomical landmarks  Assessment  Ease of block: easy  Patient's tolerance of the procedure: comfortable throughout block and no complaints  Post dural Puncture Headache?: No

## 2018-09-13 LAB
ALLENS TEST: ABNORMAL
ALLENS TEST: ABNORMAL
BASOPHILS # BLD AUTO: 0.01 K/UL
BASOPHILS NFR BLD: 0.1 %
DELSYS: ABNORMAL
DELSYS: ABNORMAL
DIFFERENTIAL METHOD: ABNORMAL
EOSINOPHIL # BLD AUTO: 0.1 K/UL
EOSINOPHIL NFR BLD: 0.6 %
ERYTHROCYTE [DISTWIDTH] IN BLOOD BY AUTOMATED COUNT: 14.6 %
HCO3 UR-SCNC: 19.9 MMOL/L (ref 24–28)
HCO3 UR-SCNC: 26.3 MMOL/L (ref 24–28)
HCT VFR BLD AUTO: 28.2 %
HGB BLD-MCNC: 9.1 G/DL
LYMPHOCYTES # BLD AUTO: 1.6 K/UL
LYMPHOCYTES NFR BLD: 16.4 %
MCH RBC QN AUTO: 28.3 PG
MCHC RBC AUTO-ENTMCNC: 32.3 G/DL
MCV RBC AUTO: 88 FL
MONOCYTES # BLD AUTO: 1 K/UL
MONOCYTES NFR BLD: 9.9 %
NEUTROPHILS # BLD AUTO: 7 K/UL
NEUTROPHILS NFR BLD: 72.7 %
PCO2 BLDA: 34.3 MMHG (ref 35–45)
PCO2 BLDA: 74.2 MMHG (ref 35–45)
PH SMN: 7.16 [PH] (ref 7.35–7.45)
PH SMN: 7.37 [PH] (ref 7.35–7.45)
PLATELET # BLD AUTO: 174 K/UL
PMV BLD AUTO: 9.3 FL
PO2 BLDA: 26 MMHG (ref 80–100)
PO2 BLDA: 7 MMHG (ref 80–100)
POC BE: -2 MMOL/L
POC BE: -5 MMOL/L
POC SATURATED O2: 4 % (ref 95–100)
POC SATURATED O2: 47 % (ref 95–100)
RBC # BLD AUTO: 3.22 M/UL
SAMPLE: ABNORMAL
SAMPLE: ABNORMAL
SITE: ABNORMAL
SITE: ABNORMAL
WBC # BLD AUTO: 9.68 K/UL

## 2018-09-13 PROCEDURE — 99900035 HC TECH TIME PER 15 MIN (STAT)

## 2018-09-13 PROCEDURE — 25000003 PHARM REV CODE 250: Performed by: STUDENT IN AN ORGANIZED HEALTH CARE EDUCATION/TRAINING PROGRAM

## 2018-09-13 PROCEDURE — 82803 BLOOD GASES ANY COMBINATION: CPT

## 2018-09-13 PROCEDURE — 88312 SPECIAL STAINS GROUP 1: CPT | Mod: 26,,, | Performed by: PATHOLOGY

## 2018-09-13 PROCEDURE — 88307 TISSUE EXAM BY PATHOLOGIST: CPT | Performed by: PATHOLOGY

## 2018-09-13 PROCEDURE — 63600175 PHARM REV CODE 636 W HCPCS: Performed by: STUDENT IN AN ORGANIZED HEALTH CARE EDUCATION/TRAINING PROGRAM

## 2018-09-13 PROCEDURE — 88307 TISSUE EXAM BY PATHOLOGIST: CPT | Mod: 26,,, | Performed by: PATHOLOGY

## 2018-09-13 PROCEDURE — 88312 SPECIAL STAINS GROUP 1: CPT | Performed by: PATHOLOGY

## 2018-09-13 PROCEDURE — 85025 COMPLETE CBC W/AUTO DIFF WBC: CPT

## 2018-09-13 PROCEDURE — 99231 SBSQ HOSP IP/OBS SF/LOW 25: CPT | Mod: ,,, | Performed by: OBSTETRICS & GYNECOLOGY

## 2018-09-13 PROCEDURE — 36415 COLL VENOUS BLD VENIPUNCTURE: CPT

## 2018-09-13 PROCEDURE — 11000001 HC ACUTE MED/SURG PRIVATE ROOM

## 2018-09-13 PROCEDURE — 25000003 PHARM REV CODE 250: Performed by: OBSTETRICS & GYNECOLOGY

## 2018-09-13 PROCEDURE — 99232 SBSQ HOSP IP/OBS MODERATE 35: CPT | Mod: ,,, | Performed by: OBSTETRICS & GYNECOLOGY

## 2018-09-13 RX ORDER — AMOXICILLIN 250 MG
1 CAPSULE ORAL NIGHTLY PRN
Status: DISCONTINUED | OUTPATIENT
Start: 2018-09-13 | End: 2018-09-15 | Stop reason: HOSPADM

## 2018-09-13 RX ORDER — ACETAMINOPHEN 325 MG/1
650 TABLET ORAL EVERY 6 HOURS
Status: DISCONTINUED | OUTPATIENT
Start: 2018-09-14 | End: 2018-09-13

## 2018-09-13 RX ORDER — SIMETHICONE 80 MG
1 TABLET,CHEWABLE ORAL EVERY 6 HOURS PRN
Status: DISCONTINUED | OUTPATIENT
Start: 2018-09-13 | End: 2018-09-15 | Stop reason: HOSPADM

## 2018-09-13 RX ORDER — ACETAMINOPHEN 10 MG/ML
INJECTION, SOLUTION INTRAVENOUS
Status: DISCONTINUED | OUTPATIENT
Start: 2018-09-13 | End: 2018-09-13

## 2018-09-13 RX ORDER — HYDROCODONE BITARTRATE AND ACETAMINOPHEN 5; 325 MG/1; MG/1
1 TABLET ORAL EVERY 4 HOURS PRN
Status: DISCONTINUED | OUTPATIENT
Start: 2018-09-14 | End: 2018-09-15 | Stop reason: HOSPADM

## 2018-09-13 RX ORDER — OXYCODONE HYDROCHLORIDE 5 MG/1
10 TABLET ORAL EVERY 4 HOURS PRN
Status: ACTIVE | OUTPATIENT
Start: 2018-09-13 | End: 2018-09-14

## 2018-09-13 RX ORDER — ONDANSETRON 2 MG/ML
4 INJECTION INTRAMUSCULAR; INTRAVENOUS EVERY 6 HOURS PRN
Status: ACTIVE | OUTPATIENT
Start: 2018-09-13 | End: 2018-09-14

## 2018-09-13 RX ORDER — SODIUM CHLORIDE, SODIUM LACTATE, POTASSIUM CHLORIDE, CALCIUM CHLORIDE 600; 310; 30; 20 MG/100ML; MG/100ML; MG/100ML; MG/100ML
INJECTION, SOLUTION INTRAVENOUS CONTINUOUS
Status: ACTIVE | OUTPATIENT
Start: 2018-09-13 | End: 2018-09-13

## 2018-09-13 RX ORDER — DOCUSATE SODIUM 100 MG/1
200 CAPSULE, LIQUID FILLED ORAL 2 TIMES DAILY
Status: DISCONTINUED | OUTPATIENT
Start: 2018-09-13 | End: 2018-09-15 | Stop reason: HOSPADM

## 2018-09-13 RX ORDER — HYDROCODONE BITARTRATE AND ACETAMINOPHEN 10; 325 MG/1; MG/1
1 TABLET ORAL EVERY 4 HOURS PRN
Status: DISCONTINUED | OUTPATIENT
Start: 2018-09-14 | End: 2018-09-15 | Stop reason: HOSPADM

## 2018-09-13 RX ORDER — BISACODYL 10 MG
10 SUPPOSITORY, RECTAL RECTAL ONCE AS NEEDED
Status: ACTIVE | OUTPATIENT
Start: 2018-09-13 | End: 2018-09-13

## 2018-09-13 RX ORDER — KETAMINE HYDROCHLORIDE 50 MG/ML
INJECTION, SOLUTION INTRAMUSCULAR; INTRAVENOUS
Status: DISCONTINUED | OUTPATIENT
Start: 2018-09-13 | End: 2018-09-13

## 2018-09-13 RX ORDER — DIPHENHYDRAMINE HCL 25 MG
25 CAPSULE ORAL EVERY 4 HOURS PRN
Status: DISCONTINUED | OUTPATIENT
Start: 2018-09-13 | End: 2018-09-15 | Stop reason: HOSPADM

## 2018-09-13 RX ORDER — MORPHINE SULFATE 0.5 MG/ML
INJECTION, SOLUTION EPIDURAL; INTRATHECAL; INTRAVENOUS
Status: DISCONTINUED | OUTPATIENT
Start: 2018-09-13 | End: 2018-09-13

## 2018-09-13 RX ORDER — OXYCODONE HYDROCHLORIDE 5 MG/1
5 TABLET ORAL EVERY 4 HOURS PRN
Status: ACTIVE | OUTPATIENT
Start: 2018-09-13 | End: 2018-09-14

## 2018-09-13 RX ORDER — ONDANSETRON 8 MG/1
8 TABLET, ORALLY DISINTEGRATING ORAL EVERY 8 HOURS PRN
Status: DISCONTINUED | OUTPATIENT
Start: 2018-09-13 | End: 2018-09-15 | Stop reason: HOSPADM

## 2018-09-13 RX ORDER — HYDROCORTISONE 25 MG/G
CREAM TOPICAL 3 TIMES DAILY PRN
Status: DISCONTINUED | OUTPATIENT
Start: 2018-09-13 | End: 2018-09-15 | Stop reason: HOSPADM

## 2018-09-13 RX ORDER — OXYTOCIN/RINGER'S LACTATE 20/1000 ML
41.65 PLASTIC BAG, INJECTION (ML) INTRAVENOUS CONTINUOUS
Status: ACTIVE | OUTPATIENT
Start: 2018-09-13 | End: 2018-09-13

## 2018-09-13 RX ORDER — ADHESIVE BANDAGE
30 BANDAGE TOPICAL 2 TIMES DAILY PRN
Status: DISCONTINUED | OUTPATIENT
Start: 2018-09-14 | End: 2018-09-15 | Stop reason: HOSPADM

## 2018-09-13 RX ORDER — IBUPROFEN 600 MG/1
600 TABLET ORAL EVERY 6 HOURS
Status: DISCONTINUED | OUTPATIENT
Start: 2018-09-14 | End: 2018-09-15 | Stop reason: HOSPADM

## 2018-09-13 RX ORDER — ACETAMINOPHEN 325 MG/1
650 TABLET ORAL EVERY 6 HOURS
Status: DISPENSED | OUTPATIENT
Start: 2018-09-13 | End: 2018-09-14

## 2018-09-13 RX ORDER — MUPIROCIN 20 MG/G
1 OINTMENT TOPICAL 2 TIMES DAILY
Status: DISCONTINUED | OUTPATIENT
Start: 2018-09-13 | End: 2018-09-15 | Stop reason: HOSPADM

## 2018-09-13 RX ADMIN — OXYTOCIN 3 UNITS: 10 INJECTION, SOLUTION INTRAMUSCULAR; INTRAVENOUS at 12:09

## 2018-09-13 RX ADMIN — KETAMINE HYDROCHLORIDE 20 MG: 50 INJECTION INTRAMUSCULAR; INTRAVENOUS at 12:09

## 2018-09-13 RX ADMIN — PHENYLEPHRINE HYDROCHLORIDE 100 MCG: 10 INJECTION INTRAVENOUS at 12:09

## 2018-09-13 RX ADMIN — KETOROLAC TROMETHAMINE 30 MG: 30 INJECTION, SOLUTION INTRAMUSCULAR; INTRAVENOUS at 05:09

## 2018-09-13 RX ADMIN — KETOROLAC TROMETHAMINE 30 MG: 30 INJECTION, SOLUTION INTRAMUSCULAR; INTRAVENOUS at 12:09

## 2018-09-13 RX ADMIN — ACETAMINOPHEN 650 MG: 325 TABLET ORAL at 06:09

## 2018-09-13 RX ADMIN — ACETAMINOPHEN 650 MG: 325 TABLET ORAL at 12:09

## 2018-09-13 RX ADMIN — DOCUSATE SODIUM 200 MG: 100 CAPSULE, LIQUID FILLED ORAL at 06:09

## 2018-09-13 RX ADMIN — ACETAMINOPHEN 1000 MG: 10 INJECTION, SOLUTION INTRAVENOUS at 12:09

## 2018-09-13 RX ADMIN — OXYTOCIN 4 UNITS: 10 INJECTION, SOLUTION INTRAMUSCULAR; INTRAVENOUS at 12:09

## 2018-09-13 RX ADMIN — ACETAMINOPHEN 650 MG: 325 TABLET ORAL at 05:09

## 2018-09-13 RX ADMIN — Medication 1.5 MG: at 12:09

## 2018-09-13 RX ADMIN — MUPIROCIN 1 G: 20 OINTMENT TOPICAL at 06:09

## 2018-09-13 NOTE — PROGRESS NOTES
Baby Led Bottle Feeding education    Wash your hands.   Feed Baby on cue, not on a schedule. Babies give cues when ready to feed. Cues are soft sounds like grunts, moving arms and leg, licking lips, turning head to the side with an open mouth, and sucking hands/fingers.   Hold baby skin to skin during feedings. Look into babys eyes, talk to baby, and stroke baby while baby suckles.   Baby should be fed 8 or more times a day depending on babys cues. Some babies may be sleepy and may need to be awakened for their feeds to get the 8 feeds a day needed.   Hold the baby close while feeding and never prop a bottle.   Hold baby upright supporting head and neck.   Place the tip of nipple below babys nose, rubbing top lip and allow baby to open mouth and accept the nipple.   Hold the bottle horizontally, (level with the ground), tilt the bottle just enough to get milk in the nipple.   Watch for stress cues during feeding. Be alert for baby wrinkling eyebrow, baby turning head away from bottle, or getting fussy. Baby may need a break.   Once baby releases nipple or pulls away, do not force baby to finish bottle. Baby is full when sucks slow or stops, arms relax, turns away from nipple, pushes away or falls asleep.   Pace the feeding, feed slowly so that baby is given 15-20 minutes with breaks to burp every 10-15mls.   Alternate arms part way through feeding to allow stimulation to both babys eyes.   Use formula within one hour of starting feeding. Throw away left over formula.    Mother able to demonstrate baby led bottlefeeding

## 2018-09-13 NOTE — PHYSICIAN QUERY
PT Name: Ken Bills  MR #: [id number]     Physician Query Form - Documentation Clarification      CDS/: TIFFANY Medina,RNC-[email or web address]            Contact information:[email or web address]    This form is a permanent document in the medical record.     Query Date: 2018    By submitting this query, we are merely seeking further clarification of documentation. Please utilize your independent clinical judgment when addressing the question(s) below.    The Medical record reflects the following:    Supporting Clinical Findings Location in Medical Record   This IUP is complicated by GBS + Status    Group B streptococcal infection  - Penicillin for GBS Prophylaxis    Technical Procedures Used: Primary  Delivery via Pfannenstiel Incision   H&P             L&D Delivery note                                                                                       Doctor, Please specify diagnosis or diagnoses associated with above clinical findings.    Provider Use Only      [ X ] Group beta strep positive carrier status only  [  ] Group beta strep infection, please specify infection type:____________________________________________  [  ] Other, please specify:__________________________________                                                                                                                   [  ] Clinically undetermined

## 2018-09-13 NOTE — TRANSFER OF CARE
"Anesthesia Transfer of Care Note    Patient: Ken Bills    Procedure(s) Performed: Procedure(s) (LRB):   SECTION (N/A)    Patient location: Labor and Delivery    Anesthesia Type: epidural    Transport from OR: Transported from OR on room air with adequate spontaneous ventilation    Post pain: adequate analgesia    Post assessment: no apparent anesthetic complications    Post vital signs: stable    Level of consciousness: awake    Nausea/Vomiting: no nausea/vomiting    Complications: none    Transfer of care protocol was followed      Last vitals:   Visit Vitals  /85   Pulse (!) 54   Temp 36.7 °C (98 °F)   Resp 18   Ht 5' 6" (1.676 m)   Wt 80.7 kg (178 lb)   LMP 2017 (Approximate)   SpO2 100%   Breastfeeding? No   BMI 28.73 kg/m²     "

## 2018-09-13 NOTE — ANESTHESIA POSTPROCEDURE EVALUATION
"Anesthesia Post Evaluation    Patient: Ken Bills    Procedure(s) Performed: Procedure(s) (LRB):   SECTION (N/A)    Final Anesthesia Type: epidural  Patient location during evaluation: labor & delivery  Patient participation: Yes- Able to Participate  Level of consciousness: awake and alert  Post-procedure vital signs: reviewed and stable  Pain management: adequate  Airway patency: patent  PONV status at discharge: No PONV  Anesthetic complications: no      Cardiovascular status: blood pressure returned to baseline and stable  Respiratory status: room air, unassisted and spontaneous ventilation  Hydration status: euvolemic  Follow-up not needed.        Visit Vitals  BP (!) 114/58   Pulse 97   Temp 37.2 °C (98.9 °F) (Oral)   Resp 18   Ht 5' 6" (1.676 m)   Wt 80.7 kg (178 lb)   LMP 2017 (Approximate)   SpO2 98%   Breastfeeding? Unknown   BMI 28.73 kg/m²       Pain/Lisette Score: Pain Rating Prior to Med Admin: 5 (2018 12:44 PM)  Pain Rating Post Med Admin: 5 (2018  6:54 AM)        "

## 2018-09-13 NOTE — L&D DELIVERY NOTE
[facility]   Section   Operative Note    SUMMARY     Date of Procedure: 2018     Procedure: Procedure(s) (LRB):   SECTION (N/A)    Surgeon(s) and Role:     * Jesi Iqbal MD - Primary    Assisting Surgeon: Shivam Ruiz MD    Pre-Operative Diagnosis: Indication for care in labor or delivery [O75.9]  Non-reassuring fetal heart rate or rhythm affecting management of mother [O36.8390]    Post-Operative Diagnosis: Post-Op Diagnosis Codes:     * Indication for care in labor or delivery [O75.9]     * Non-reassuring fetal heart rate or rhythm affecting management of mother [O36.8390]    Anesthesia: Spinal/Epidural    Technical Procedures Used: Primary  Delivery via Pfannenstiel Incision           Description of the Findings of the Procedure:   1. Single viable female infant in transverse presentation  2. Normal uterine contour with bilateral normal ovaries and fallopian tubes   3. Good hemostasis noted at the conclusion of the case    Complications: No    Blood Loss: 825 mL    PreOp CBC:   Lab Results   Component Value Date    WBC 8.14 2018    HGB 11.3 (L) 2018    HCT 35.1 (L) 2018    MCV 88 2018     2018       Procedure Details   The patient was seen in the Holding Room. The risks, benefits, complications, treatment options, and expected outcomes were discussed with the patient.  The patient concurred with the proposed plan, giving informed consent.  The site of surgery properly noted/marked. The patient was taken to Operating Room, identified as Ken Bills and the procedure verified as  Delivery. A Time Out was held and the above information confirmed.    After induction of anesthesia, the patient was prepped and draped in the usual sterile manner while placed in a dorsal supine position with a left lateral tilt.  A jacobsen catheter was also placed per nursing. Preoperative antibiotics were administered, a  vaginal prep was perfromed and an allis test was performed yielding adequate anesthesia.  A Pfannenstiel incision was made and carried down through the subcutaneous tissue to the fascia. Fascial incision was made and extended transversely. The fascia was grasped with Kocher clamps and  from the underlying rectus tissue superiorly and inferiorly. The peritoneum was identified, found to be free of adherent bowel and entered bluntly. Peritoneal incision was extended longitudinally. The vesico-uterine peritoneum was identified and bladder blade was inserted. The vesico-uterine peritoneum was incised transversely and the bladder flap was bluntly freed from the lower uterine segment. The bladder blade was reinserted to keep the bladder out of the operative field. A low transverse uterine incision was made with knife and extended with finger fracture. The amniotic sac was ruptured with hysterotomy and the infant was noted to be in vertex transverse position. The fetal head was brought to the incision and elevated out of the pelvis. Additionally a loose nuchal was noted and reduced without complication. The patient delivered a single viable female infant without difficulty.  Infant weighed 3220 grams with Apgar scores of 6/9 at one and five minutes respectively. After the umbilical cord was clamped and cut cord blood was obtained for evaluation. The placenta was removed intact and appeared normal and was sent for pathology. The uterus was exteriorized. The uterine outline, tubes and ovaries appeared normal. The uterine incision was closed with running locked sutures of 0-Chromic. Hemostasis was observed. The uterus was returned to the abdominal cavity. Incision was reinspected and good hemostasis was noted. The abdominal cavity was irrigated to remove clots. The peritoneum and muscle was reapproximated with 2-0 Vicryl and 0-Chromic. The fascia was then reapproximated with running sutures of #1 PDS.  The skin was  reapproximated with 4-0 Monocryl.     Instrument, sponge, and needle counts were correct prior the abdominal closure and at the conclusion of the case.     Pt tolerated procedure well and Dr. Iqbal discussed with family that pt was stable and in good condition after the procedure.          Specimens:   Specimen (12h ago, onward)    Start     Ordered    18  Specimen to Pathology - Surgery  Once     Comments:  placenta     Start Status   18 Collected (18)       18          Condition: Good    Disposition: PACU - hemodynamically stable.    Attestation: Good         Delivery Information for  Andre Bills    Birth information:  YOB: 2018   Time of birth: 11:49 PM   Sex: female   Head Delivery Date/Time: 2018 11:49 PM   Delivery type: , Low Transverse   Gestational Age: 41w0d    Delivery Providers    Delivering clinician:  Jesi Iqbal   Provider Role    Shivam Ruiz MD Resident    Jesi Friedman, RN Registered Nurse    Isak Rubio, RN Registered Nurse    Devi Arvizu Surgical Tech            Measurements    Weight:  3220 g  Length:  50.8 cm  Head circumference:  34.3 cm  Chest circumference:  33 cm         Apgars    Living status:  Living  Apgars:   1 min.:   5 min.:   10 min.:   15 min.:   20 min.:     Skin color:   0  1       Heart rate:   2  2       Reflex irritability:   1  2       Muscle tone:   2  2       Respiratory effort:   1  2       Total:   6  9       Apgars assigned by:  GULSHAN/GREGOR         Operative Delivery    Forceps attempted?:  No  Vacuum extractor attempted?:  No         Shoulder Dystocia    Shoulder dystocia present?:  No           Presentation    Presentation:  Vertex  Position:  Occiput           Interventions/Resuscitation    Method:  Bulb Suctioning, Blow By Oxygen, Deep Suctioning, NICU Attended       Cord    Vessels:  3 vessels  Complications:  Nuchal  Nuchal Intervention:  reduced  Nuchal Cord  Description:  loose nuchal cord  Number of Loops:  1  Delayed Cord Clamping?:  No  Cord Clamped Date/Time:  2018 11:49 PM  Cord Blood Disposition:  Sent with Baby  Gases Sent?:  Yes  Stem Cell Collection (by MD):  No       Placenta    Placenta delivery date/time:  2018 2351  Placenta removal:  Manual removal  Placenta appearance:  Intact  Placenta disposition:  pathology           Labor Events:       labor: No     Labor Onset Date/Time:         Dilation Complete Date/Time:         Start Pushing Date/Time:       Rupture Date/Time:              Rupture type:           Fluid Amount:        Fluid Color:        Fluid Odor:        Membrane Status (PeriCalm): ARM (Artificial Rupture)      Rupture Date/Time (PeriCalm):        Fluid Amount (PeriCalm): Small      Fluid Color (PeriCalm): Bloody       steroids: None     Antibiotics given for GBS: Yes     Induction: balloon dilation (Boucher);misoprostol     Indications for induction:  Post-term Gestation     Augmentation: oxytocin     Indications for augmentation: Ineffective Contraction Pattern     Labor complications: Fetal Intolerance     Additional complications:          Cervical ripening:                     Delivery:      Episiotomy: None     Indication for Episiotomy:       Perineal Lacerations: None Repaired:      Periurethral Laceration: none Repaired:     Labial Laceration: none Repaired:     Sulcus Laceration: none Repaired:     Vaginal Laceration: No Repaired:     Cervical Laceration: No Repaired:     Repair suture:       Repair # of packets:       Vaginal delivery QBL (mL): 0      QBL (mL): 825     Combined Blood Loss (mL): 825     Vaginal Sweep Performed:       Surgicount Correct:         Other providers:       Anesthesia    Method:  Epidural, Spinal          Details (if applicable):  Trial of Labor Yes    Categorization: Primary    Priority: Routine   Indications for : Fetal Intolerance of  Labor   Incision Type: low transverse     Additional  information:  Forceps:    Vacuum:    Breech:    Observed anomalies    Other (Comments): NICU called to delivery for terminal meconium. Arrived at 2 min of life Apgars 6/9 assigned.

## 2018-09-13 NOTE — PROGRESS NOTES
POSTPARTUM PROGRESS NOTE     Ken Bills is a 28 y.o. female POD #1 status post Spontaneous vaginal delivery at 41w0d in a pregnancy complicated by GBS+ status. Patient is doing well this morning. She denies nausea, vomiting, fever or chills. Patient reports moderate abdominal pain that is adequately relieved by oral pain medications. Lochia is mild to moderate  and stable. Patient has not yet voided or ambulated due to Boucher still in place. She has not passed flatus, and has not had BM. Patient does plan to breast feed. Patient desires Depo for contraception.    Objective:       Temp:  [97.7 °F (36.5 °C)-98 °F (36.7 °C)] 97.7 °F (36.5 °C)  Pulse:  [54-93] 65  Resp:  [18] 18  SpO2:  [89 %-100 %] 100 %  BP: ()/(60-92) 114/80    General:   alert, appears stated age and cooperative   Lungs:   clear to auscultation bilaterally   Heart:   regular rate and rhythm, S1, S2 normal, no murmur, click, rub or gallop   Abdomen:  soft, non-tender; bowel sounds normal; no masses,  no organomegaly   Uterus:  firm located at the umblicus.    Incision: Bandage in place and is clean and dry   Extremities: peripheral pulses normal, no pedal edema, no clubbing or cyanosis     Lab Review  No results found for this or any previous visit (from the past 4 hour(s)).    I/O    Intake/Output Summary (Last 24 hours) at 2018 0755  Last data filed at 2018 0310  Gross per 24 hour   Intake 3943.7 ml   Output 2975 ml   Net 968.7 ml        Assessment:     Patient Active Problem List   Diagnosis    Pregnancy, supervision, normal, first    Indication for care in labor or delivery    41 weeks gestation of pregnancy    Group B streptococcal infection     delivery delivered - Failure to Progress at 5 cm        Plan:   1. Postpartum care s/p 1LTCS:  - Patient doing well. Continue routine management and advances.  - Continue PO pain meds. Pain well controlled.  - Heme: H/h 11.3/35.1  - Boucher to be discontinued this  morning, encourage ambulation  - Contraception - Depo before discharge  - Lactation prn      Dispo: As patient meets milestones, will plan to discharge POD#3-4.    Mikaela Blanco MD  OBGYN, PGY-1

## 2018-09-13 NOTE — PROGRESS NOTES
LABOR PROGRESS NOTE    S:  MD to bedside for cervical check. Epidural in place, working    O: Temp:  [96.3 °F (35.7 °C)-98 °F (36.7 °C)] 98 °F (36.7 °C)  Pulse:  [54-93] 54  Resp:  [16-18] 18  SpO2:  [89 %-100 %] 100 %  BP: (101-139)/(62-92) 139/85      FHT: 115BPM/Mod beat to beat variability/+accels/intermittent late decel Cat 2  CTX: q 3-5 minutes  SVE: 4-5/80/-2      Discussed labor course with patient including the fact that she has remained 4-5 cm for 10 hours. Labor course also complicated by persistent intermittent late decelerations, inability to increase pitocin due to decelerations.   Recommended C/S at this time due to fetal intolerance of labor, patient and family in agreement with plan.         Discussed with Dr. Iqbal. Anesthesia, charge aware.    Katja Vargas MD  Ob/Gyn PGY-3

## 2018-09-13 NOTE — ANESTHESIA RELEASE NOTE
"Anesthesia Release from PACU Note    Patient: Ken Bills    Procedure(s) Performed: Procedure(s) (LRB):   SECTION (N/A)    Anesthesia type: epidural    Post pain: Adequate analgesia    Post assessment: no apparent anesthetic complications    Last Vitals:   Visit Vitals  /66   Pulse 69   Temp 36.7 °C (98 °F)   Resp 18   Ht 5' 6" (1.676 m)   Wt 80.7 kg (178 lb)   LMP 2017 (Approximate)   SpO2 100%   Breastfeeding? No   BMI 28.73 kg/m²       Post vital signs: stable    Level of consciousness: awake    Nausea/Vomiting: no nausea/no vomiting    Complications: none    Airway Patency: patent    Respiratory: unassisted    Cardiovascular: stable and blood pressure at baseline    Hydration: euvolemic  "

## 2018-09-14 PROCEDURE — 72100003 HC LABOR CARE, EA. ADDL. 8 HRS

## 2018-09-14 PROCEDURE — 36000684 HC CESAREAN SECTION, UNSCHEDULED

## 2018-09-14 PROCEDURE — 25000003 PHARM REV CODE 250: Performed by: STUDENT IN AN ORGANIZED HEALTH CARE EDUCATION/TRAINING PROGRAM

## 2018-09-14 PROCEDURE — 51702 INSERT TEMP BLADDER CATH: CPT

## 2018-09-14 PROCEDURE — 11000001 HC ACUTE MED/SURG PRIVATE ROOM

## 2018-09-14 PROCEDURE — 99231 SBSQ HOSP IP/OBS SF/LOW 25: CPT | Mod: ,,, | Performed by: OBSTETRICS & GYNECOLOGY

## 2018-09-14 RX ORDER — HYDROCODONE BITARTRATE AND ACETAMINOPHEN 5; 325 MG/1; MG/1
1 TABLET ORAL EVERY 4 HOURS PRN
Qty: 30 TABLET | Refills: 0 | OUTPATIENT
Start: 2018-09-14 | End: 2019-05-15

## 2018-09-14 RX ORDER — IBUPROFEN 600 MG/1
600 TABLET ORAL EVERY 6 HOURS
Qty: 30 TABLET | Refills: 0 | OUTPATIENT
Start: 2018-09-14 | End: 2019-05-15

## 2018-09-14 RX ORDER — DOCUSATE SODIUM 100 MG/1
200 CAPSULE, LIQUID FILLED ORAL 2 TIMES DAILY
Qty: 40 CAPSULE | Refills: 0 | COMMUNITY
Start: 2018-09-14

## 2018-09-14 RX ADMIN — DOCUSATE SODIUM 200 MG: 100 CAPSULE, LIQUID FILLED ORAL at 09:09

## 2018-09-14 RX ADMIN — MUPIROCIN 1 G: 20 OINTMENT TOPICAL at 09:09

## 2018-09-14 RX ADMIN — IBUPROFEN 600 MG: 600 TABLET ORAL at 06:09

## 2018-09-14 RX ADMIN — HYDROCODONE BITARTRATE AND ACETAMINOPHEN 1 TABLET: 10; 325 TABLET ORAL at 04:09

## 2018-09-14 RX ADMIN — IBUPROFEN 600 MG: 600 TABLET ORAL at 12:09

## 2018-09-14 RX ADMIN — HYDROCODONE BITARTRATE AND ACETAMINOPHEN 1 TABLET: 5; 325 TABLET ORAL at 09:09

## 2018-09-14 RX ADMIN — IBUPROFEN 600 MG: 600 TABLET ORAL at 01:09

## 2018-09-14 NOTE — PLAN OF CARE
Pt tolerating PO well, no acute distress, ambulating and voiding without difficulty, bleeding light, fundus firm, pain well controlled on prescribed meds, bonding well with infant-- formula feeding.  Pt safety maintained.

## 2018-09-14 NOTE — DISCHARGE SUMMARY
Delivery Discharge Summary  Obstetrics      Primary OB Clinician: [facility]    Admission date: 2018  Discharge date: 09/15/2018    Disposition: To home, self care    Admit Dx:      Patient Active Problem List   Diagnosis    Pregnancy, supervision, normal, first    Indication for care in labor or delivery    41 weeks gestation of pregnancy    Group B streptococcal infection     delivery delivered - Failure to Progress at 5 cm     Discharge Dx:    Patient Active Problem List   Diagnosis    Pregnancy, supervision, normal, first    Indication for care in labor or delivery    41 weeks gestation of pregnancy    Group B streptococcal infection     delivery delivered - Failure to Progress at 5 cm     Procedure: , due to non-reassuring fetal heart tracing    Hospital Course:  Ken Bills is a 28 y.o. now , POD #3 who was admitted on 2018 at 40w6d for scheduled IOL for dates. On initial assessment, vital signs were stable and physical exam was normal. Infant was in cephalic presentation. Patient was subsequently admitted to labor and delivery unit with signed consents. Her induction course was notable for failure to dilate beyond 5 cm, which was complicated by persistent intermittent late decelerations precluding pitocin augmentation. The decision was therefore made to proceed with 1LTCS. She delivered a single viable  female via 1LTCS. Please see delivery note for further details. Pt was in stable condition post delivery and was transferred to the Mother-Baby Unit. Her postpartum course was uncomplicated. On discharge day, patient's pain is controlled with oral pain medications. Pt is tolerating ambulation without SOB or CP, and PO diet without N/V. Reports lochia is mild. Denies any HA, vision changes, F/C, LE swelling. Pt in stable condition and ready for discharge. Discharge instructions and precautions reviewed.    Pertinent studies:  Postpartum  CBC  Lab Results   Component Value Date    WBC 9.68 2018    HGB 9.1 (L) 2018    HCT 28.2 (L) 2018    MCV 88 2018     2018     Tubal Ligation: n/a  Feeding Method: breast  Rh Immune Globulin Given(O POS): N/A  Rubella Vaccine Given: N/A - immune  Tdap Vaccine Given: ordered to be given at discharge    Delivery:    Episiotomy: None   Lacerations: None   Repair suture:     Repair # of packets:     Blood loss (ml): 0     Birth information:  YOB: 2018   Time of birth: 11:49 PM   Sex: female   Delivery type: , Low Transverse   Gestational Age: 41w0d    Delivery Clinician:      Other providers:       Additional  information:  Forceps:    Vacuum:    Breech:    Observed anomalies      Living?:           APGARS  One minute Five minutes Ten minutes   Skin color:         Heart rate:         Grimace:         Muscle tone:         Breathing:         Totals: 6  9        Placenta: Delivered:       appearance      Patient Instructions:   Current Discharge Medication List      START taking these medications    Details   docusate sodium (COLACE) 100 MG capsule Take 2 capsules (200 mg total) by mouth 2 (two) times daily.  Qty: 40 capsule, Refills: 0      HYDROcodone-acetaminophen (NORCO) 5-325 mg per tablet Take 1 tablet by mouth every 4 (four) hours as needed.  Qty: 30 tablet, Refills: 0      ibuprofen (ADVIL,MOTRIN) 600 MG tablet Take 1 tablet (600 mg total) by mouth every 6 (six) hours.  Qty: 30 tablet, Refills: 0         CONTINUE these medications which have NOT CHANGED    Details   prenatal 21-iron fu-folic acid (PRENATAL COMPLETE) 14 mg iron- 400 mcg Tab Take 1 tablet by mouth once daily.  Qty: 30 tablet, Refills: 7         STOP taking these medications       ondansetron (ZOFRAN) 4 MG tablet Comments:   Reason for Stopping:         ondansetron (ZOFRAN-ODT) 4 MG TbDL Comments:   Reason for Stopping:             Discharge Procedure Orders   Diet Adult Regular     Notify  your health care provider if you experience any of the following:  increased confusion or weakness     Notify your health care provider if you experience any of the following:  persistent dizziness, light-headedness, or visual disturbances     Notify your health care provider if you experience any of the following:  worsening rash     Notify your health care provider if you experience any of the following:  severe persistent headache     Notify your health care provider if you experience any of the following:  difficulty breathing or increased cough     Notify your health care provider if you experience any of the following:  severe uncontrolled pain     Notify your health care provider if you experience any of the following:  persistent nausea and vomiting or diarrhea     Notify your health care provider if you experience any of the following:  temperature >100.4     Notify your health care provider if you experience any of the following:  redness, tenderness, or signs of infection (pain, swelling, redness, odor or green/yellow discharge around incision site)     Activity as tolerated       Follow-up Information     [facility] In 6 weeks.    Specialty:  Obstetrics and Gynecology  Why:  Post-partum check-up, Post-op checkup  Contact information:  [address] [facility] [address] [phone]               Karen Harrison MD, PhD  OBGYN, PGY-3

## 2018-09-14 NOTE — PROGRESS NOTES
POSTPARTUM PROGRESS NOTE     Ken Bills is a 28 y.o. female POD #2 status post Spontaneous vaginal delivery at 41w0d in a pregnancy complicated by GBS+ status. Patient is doing well this morning. She denies nausea, vomiting, fever or chills. Patient reports moderate abdominal pain that is adequately relieved by oral pain medications. Lochia is mild and stable. Patient has voiding and ambulating without difficulty. She has passed flatus, and has not had BM. Patient does plan to breast feed. Patient desires Depo for contraception.    Objective:       Temp:  [97.7 °F (36.5 °C)-98.9 °F (37.2 °C)] 98.4 °F (36.9 °C)  Pulse:  [71-97] 89  Resp:  [18] 18  SpO2:  [97 %-99 %] 97 %  BP: (112-139)/(55-74) 139/55    General:   alert, appears stated age and cooperative   Lungs:   clear to auscultation bilaterally   Heart:   regular rate and rhythm, S1, S2 normal, no murmur, click, rub or gallop   Abdomen:  soft, non-tender; bowel sounds normal; no masses,  no organomegaly   Uterus:  firm located at the umblicus.    Incision: Bandage in place and is clean and dry   Extremities: peripheral pulses normal, no pedal edema, no clubbing or cyanosis     Lab Review  No results found for this or any previous visit (from the past 4 hour(s)).    I/O    Intake/Output Summary (Last 24 hours) at 2018 0718  Last data filed at 2018 2000  Gross per 24 hour   Intake --   Output 940 ml   Net -940 ml        Assessment:     Patient Active Problem List   Diagnosis    Pregnancy, supervision, normal, first    Indication for care in labor or delivery    41 weeks gestation of pregnancy    Group B streptococcal infection     delivery delivered - Failure to Progress at 5 cm        Plan:   1. Postpartum care s/p 1LTCS:  - Patient doing well. Continue routine management and advances.  - Continue PO pain meds. Pain well controlled.  - Heme: H/h 11.3/35.1  - Boucher to be discontinued this morning, encourage ambulation  -  Contraception - Depo before discharge  - Lactation prn    2. Contraception  - depo injection prior to discharge      Dispo: As patient meets milestones, will plan to discharge POD#3-4.    Adalgisa Harding MD   OBGYN, PGY-1

## 2018-09-15 VITALS
WEIGHT: 178 LBS | OXYGEN SATURATION: 99 % | RESPIRATION RATE: 18 BRPM | SYSTOLIC BLOOD PRESSURE: 123 MMHG | BODY MASS INDEX: 28.61 KG/M2 | TEMPERATURE: 98 F | HEIGHT: 66 IN | HEART RATE: 82 BPM | DIASTOLIC BLOOD PRESSURE: 69 MMHG

## 2018-09-15 PROCEDURE — 25000003 PHARM REV CODE 250: Performed by: STUDENT IN AN ORGANIZED HEALTH CARE EDUCATION/TRAINING PROGRAM

## 2018-09-15 PROCEDURE — 99233 SBSQ HOSP IP/OBS HIGH 50: CPT | Mod: ,,, | Performed by: OBSTETRICS & GYNECOLOGY

## 2018-09-15 RX ADMIN — HYDROCODONE BITARTRATE AND ACETAMINOPHEN 1 TABLET: 5; 325 TABLET ORAL at 09:09

## 2018-09-15 RX ADMIN — MUPIROCIN 1 G: 20 OINTMENT TOPICAL at 09:09

## 2018-09-15 RX ADMIN — IBUPROFEN 600 MG: 600 TABLET ORAL at 12:09

## 2018-09-15 RX ADMIN — DOCUSATE SODIUM 200 MG: 100 CAPSULE, LIQUID FILLED ORAL at 09:09

## 2018-09-15 RX ADMIN — IBUPROFEN 600 MG: 600 TABLET ORAL at 06:09

## 2018-09-15 NOTE — PLAN OF CARE
Problem: Patient Care Overview  Goal: Plan of Care Review  Outcome: Outcome(s) achieved Date Met: 09/15/18  Pt ambulating, voiding, and passing flatus. Pt tolerating PO well and there is no SS of distress at this time. Pt's pain well controlled throughout shift by oral pain medication. Pt's bleeding has been light throughout shift and fundus is firm. Pt discharged this afternoon per physician's order. Pt verbalized understanding that she must follow up with her OBGYN in 6 weeks. Mother Baby Care Guide reviewed yesterday. Pt stable at this time.

## 2018-09-15 NOTE — PROGRESS NOTES
POSTPARTUM PROGRESS NOTE     Ken Bills is a 28 y.o. female POD #3 status post Spontaneous vaginal delivery at 41w0d in a pregnancy complicated by GBS+ status. Patient is doing well this morning. She denies nausea, vomiting, fever or chills. Patient reports moderate abdominal pain that is adequately relieved by oral pain medications. Lochia is mild and stable. Patient has voiding and ambulating without difficulty. She has passed flatus, and has not had BM. Patient does plan to breast feed. Depo ordered to be given prior to discharge for contraception.    Objective:       Temp:  [98.1 °F (36.7 °C)-98.4 °F (36.9 °C)] 98.4 °F (36.9 °C)  Pulse:  [74-92] 82  Resp:  [16-18] 18  SpO2:  [99 %-100 %] 99 %  BP: (116-134)/(60-83) 123/69    General:   alert, appears stated age and cooperative   Lungs:   clear to auscultation bilaterally   Heart:   regular rate and rhythm, S1, S2 normal, no murmur, click, rub or gallop   Abdomen:  soft, non-tender; bowel sounds normal; no masses,  no organomegaly   Uterus:  firm located at the umblicus.    Incision: Incision is clean, intact, and dry   Extremities: peripheral pulses normal, no pedal edema, no clubbing or cyanosis     Lab Review  No results found for this or any previous visit (from the past 4 hour(s)).    I/O    Intake/Output Summary (Last 24 hours) at 9/15/2018 0916  Last data filed at 2018  Gross per 24 hour   Intake --   Output 750 ml   Net -750 ml        Assessment:     Patient Active Problem List   Diagnosis    Pregnancy, supervision, normal, first    Indication for care in labor or delivery    41 weeks gestation of pregnancy    Group B streptococcal infection     delivery delivered - Failure to Progress at 5 cm     Plan:   1. Postpartum care s/p 1LTCS:  - Patient doing well. Continue routine management and advances.  - Continue PO pain meds. Pain well controlled.  - Heme: H/h 11.3/35.1 > 9.7/9.1/28.2/174    - Boucher to be discontinued this  morning, encourage ambulation  - Contraception - Depo ordered to be given before discharge  - Lactation prn    2. Contraception  - depo injection prior to discharge      Dispo: As patient meets milestones, will plan to discharge today, POD#3.    Karen Harrison MD, PhD  OBGYN, PGY-3

## 2018-10-02 ENCOUNTER — TELEPHONE (OUTPATIENT)
Dept: OBSTETRICS AND GYNECOLOGY | Facility: CLINIC | Age: 28
End: 2018-10-02

## 2018-10-02 NOTE — TELEPHONE ENCOUNTER
----- Message from Lotus Hennessy sent at 10/2/2018  1:09 PM CDT -----  Contact: self   Pt is requesting refills on her prescriptions (ADVIL,MOTRIN) and  (NORCO). The pt can be reached at [phone].

## 2018-10-26 ENCOUNTER — POSTPARTUM VISIT (OUTPATIENT)
Dept: OBSTETRICS AND GYNECOLOGY | Facility: CLINIC | Age: 28
End: 2018-10-26
Payer: MEDICAID

## 2018-10-26 VITALS
BODY MASS INDEX: 26.54 KG/M2 | DIASTOLIC BLOOD PRESSURE: 80 MMHG | HEIGHT: 66 IN | SYSTOLIC BLOOD PRESSURE: 136 MMHG | WEIGHT: 165.13 LBS

## 2018-10-26 PROBLEM — Z34.00 PREGNANCY, SUPERVISION, NORMAL, FIRST: Status: RESOLVED | Noted: 2018-02-19 | Resolved: 2018-10-26

## 2018-10-26 PROBLEM — Z3A.41 41 WEEKS GESTATION OF PREGNANCY: Status: RESOLVED | Noted: 2018-09-12 | Resolved: 2018-10-26

## 2018-10-26 PROBLEM — O48.0 41 WEEKS GESTATION OF PREGNANCY: Status: RESOLVED | Noted: 2018-09-12 | Resolved: 2018-10-26

## 2018-10-26 PROBLEM — A49.1 GROUP B STREPTOCOCCAL INFECTION: Status: RESOLVED | Noted: 2018-09-12 | Resolved: 2018-10-26

## 2018-10-26 PROCEDURE — 99212 OFFICE O/P EST SF 10 MIN: CPT | Mod: PBBFAC,PO | Performed by: OBSTETRICS & GYNECOLOGY

## 2018-10-26 PROCEDURE — 99999 PR PBB SHADOW E&M-EST. PATIENT-LVL II: CPT | Mod: PBBFAC,,, | Performed by: OBSTETRICS & GYNECOLOGY

## 2018-10-26 NOTE — PROGRESS NOTES
Chief Complaint   Patient presents with    Postpartum Care       HPI:  28 y.o. female  presents for a post-partum check-up    Patient's last menstrual period was 2017 (approximate).    Delivery: LTCS, 2018  Hospitalization: UNCOMPLICATED  Ambulating, tolerating Po, moving bowels: YES  Pain controlled: YES  Bleeding: STOPPED  Breastfeeding: NO  Breast pain or engorgement: DENIES  Postpartum depression: NO MOOD ISSUES.  NO THOUGHTS OF HURTING HERSELF OR HER BABY  Incision: NO COMPLAINTS    Contraception: DESIRES DEPO-PROVERA  Pap: 2017, NILM    Past Medical History:   Diagnosis Date    Allergic      Past Surgical History:   Procedure Laterality Date     SECTION N/A 2018    Procedure:  SECTION;  Surgeon: Jesi Iqbal MD;  Location: [facility] L&D;  Service: OB/GYN;  Laterality: N/A;     SECTION N/A 2018    Performed by Jesi Iqbal MD at [facility] L&D    NO PAST SURGERIES         Social History     Tobacco Use    Smoking status: Never Smoker    Smokeless tobacco: Never Used   Substance Use Topics    Alcohol use: No     Comment: rarely     Family History   Problem Relation Age of Onset    Breast cancer Neg Hx     Colon cancer Neg Hx     Ovarian cancer Neg Hx      OB History    Para Term  AB Living   1 1 1 0 0 1   SAB TAB Ectopic Multiple Live Births   0 0 0 0 1      # Outcome Date GA Lbr Randell/2nd Weight Sex Delivery Anes PTL Lv   1 Term 18 41w0d  3.22 kg (7 lb 1.6 oz) F CS-LTranv EPI, Spinal N EFE      Complications: Fetal Intolerance          MEDICATIONS: Reviewed with patient.  ALLERGIES: Patient has no known allergies.     ROS:  Review of Systems   Constitutional: Negative for fever.   Respiratory: Negative for shortness of breath.    Cardiovascular: Negative for chest pain.   Gastrointestinal: Negative for abdominal pain, nausea and vomiting.   Genitourinary: Negative for menstrual problem, pelvic pain and vaginal bleeding.  "  Neurological: Negative for headaches.   Psychiatric/Behavioral: Negative for depression.   Breast: Negative for breast mass and breast pain      PHYSICAL EXAM:    /80   Ht 5' 6" (1.676 m)   Wt 74.9 kg (165 lb 2 oz)   LMP 12/13/2017 (Approximate)   BMI 26.65 kg/m²     Physical Exam:   Constitutional: She is oriented to person, place, and time. She appears well-developed.    HENT:   Head: Normocephalic.       Pulmonary/Chest: Effort normal.        Abdominal: She exhibits no mass. There is no hepatosplenomegaly. There is no tenderness. No hernia.   Incision: Well-healed; no erythema, induration, or drainage.     Genitourinary: Vagina normal. Uterus is not enlarged and not tender. Cervix is normal. Right adnexum displays no tenderness and no fullness. Left adnexum displays no tenderness and no fullness.   Genitourinary Comments: External genitalia: Normal  Urethra: No tenderness; normal meatus  Bladder: No tenderness               Neurological: She is alert and oriented to person, place, and time.     Psychiatric: She has a normal mood and affect.         ASSESSMENT & PLAN:   Encounter for routine postpartum follow-up        - Doing well  - Exam: NORMAL  - Mood / depression screening: NO ISSUES  - Contraception: PATIENT PLANS TO GET DEPO-PROVERA FROM A CLINIC CLOSER TO HER HOME.  - Lactation referral: NOT INDICATED    - Follow-up: 1 YEAR FOR ANNUAL EXAM  "

## 2018-10-26 NOTE — LETTER
10/26/2018                 [facility]  [address]  [facility] [address]  Phone: [phone]   10/26/2018    Patient: Ken Bills   YOB: 1990   Date of Visit: 10/26/2018       To Whom it May Concern:    Ken Bills was seen in my clinic on 10/26/2018. She can return to work on 11/4/2018.    If you have any questions or concerns, please don't hesitate to call.    Sincerely,         Maricarmen Franklin MD

## 2018-11-19 NOTE — PROGRESS NOTES
"Subjective:      Christie Messer is a 22 m.o. female who presents with Nasal Congestion and Fever      Fever   This is a new problem. The current episode started in the past 7 days (T-max 101°F two days ago). The problem occurs intermittently. The problem has been waxing and waning. Associated symptoms include anorexia, congestion, fatigue, a fever and vomiting. Pertinent negatives include no rash or urinary symptoms. Nothing aggravates the symptoms. She has tried acetaminophen for the symptoms. The treatment provided significant (Got rid of the fever but has not helped with other symptoms) relief.       Review of Systems   Constitutional: Positive for fatigue, fever and malaise/fatigue.   HENT: Positive for congestion.    Eyes: Negative for discharge and redness.   Gastrointestinal: Positive for anorexia and vomiting. Negative for constipation and diarrhea.        Decreased appetite   Skin: Negative for rash.       PMH:  has a past medical history of No known health problems.  MEDS:   Current Outpatient Prescriptions:   •  triamcinolone acetonide (KENALOG) 0.1 % Cream, APPLY THIN FILM TO AFFECTED AREA UP TO TWICE A DAY ONLY IF NEEDED FOR RASH OR ITCHING., Disp: 80 g, Rfl: 3  ALLERGIES: No Known Allergies  SURGHX: History reviewed. No pertinent surgical history.  SOCHX: Lives at home with her parents and sister  FH: Family history was reviewed, no pertinent findings to report     Objective:     Pulse 134   Temp 36.4 °C (97.6 °F) (Temporal)   Resp 32   Ht 0.787 m (2' 7\")   Wt 10.4 kg (23 lb)   SpO2 94%   BMI 16.83 kg/m²        Physical Exam   Constitutional: She appears well-developed and well-nourished. She is active.   HENT:   Head: Normocephalic and atraumatic.   Right Ear: External ear and canal normal. Tympanic membrane is erythematous and bulging.   Left Ear: External ear and canal normal. Tympanic membrane is erythematous and bulging.   Nose: Mucosal edema and congestion present.   Mouth/Throat: Mucous " Doing well, no questions,no problems.  I have reviewed the resident's note, evaluated the patient and agree with the diagnosis and management plan     membranes are moist. Dentition is normal. Pharynx erythema present. Tonsils are 1+ on the right. Tonsils are 1+ on the left. No tonsillar exudate.   Eyes: Pupils are equal, round, and reactive to light. Conjunctivae are normal.   Neck: Full passive range of motion without pain. No neck adenopathy.   Cardiovascular: Normal rate, regular rhythm, S1 normal and S2 normal.    No murmur heard.  Pulmonary/Chest: Effort normal and breath sounds normal. She has no wheezes.   Abdominal: Soft. There is no tenderness.   Neurological: She is alert.   Skin: Skin is warm and dry. Capillary refill takes less than 2 seconds. No rash noted.   Vitals reviewed.        POCT Influenza A/B - Negative    POCT Rapid Strep A - Negative    Assessment/Plan:     1. Fever, unspecified fever cause  - POCT Influenza A/B  - POCT Rapid Strep A    2. Acute suppurative otitis media of both ears without spontaneous rupture of tympanic membranes, recurrence not specified  - cefdinir (OMNICEF) 250 MG/5ML suspension; Take 1.46 mL by mouth 2 times a day for 10 days.  Dispense: 29.2 mL; Refill: 0          Differential Diagnosis, natural history, and supportive care discussed. Return to the Urgent Care or follow up with your PCP if symptoms fail to resolve, or for any new or worsening symptoms. Emergency room precautions discussed. Patient and/or family appears understanding of information.

## 2018-11-28 ENCOUNTER — TELEPHONE (OUTPATIENT)
Dept: OBSTETRICS AND GYNECOLOGY | Facility: CLINIC | Age: 28
End: 2018-11-28

## 2018-11-28 DIAGNOSIS — Z30.014 ENCOUNTER FOR INITIAL PRESCRIPTION OF INTRAUTERINE CONTRACEPTIVE DEVICE (IUD): Primary | ICD-10-CM

## 2018-11-28 NOTE — TELEPHONE ENCOUNTER
----- Message from Reyna Long sent at 11/28/2018  9:39 AM CST -----  Contact: Pt   Name of Who is Calling: GEETA LANGLEY [[id number]]    What is the request in detail: Pt states that she has the information to the clinic she was told to give to Dr. Franklin. She would like her birth control information to be sent to the [facility]# [phone]. She states she is now thinking about having the Mirena. Please advise.     Can the clinic reply by [facility]: No     What Number to Call Back if not in [facility]: [phone]

## 2018-11-28 NOTE — TELEPHONE ENCOUNTER
----- Message from Liu Cortes sent at 11/28/2018 10:32 AM CST -----  Contact: GEETA LANGLEY [[id number]]    Name of Who is Calling: GEETA LANGLEY [[id number]]      What is the request in detail: Returning a call      Can the clinic reply by [facility]: no      What [facility] Back [facility] [phone]

## 2018-12-03 ENCOUNTER — HOSPITAL ENCOUNTER (EMERGENCY)
Facility: OTHER | Age: 28
Discharge: HOME | End: 2018-12-03
Attending: EMERGENCY MEDICINE
Payer: MEDICAID

## 2018-12-03 VITALS
HEART RATE: 59 BPM | OXYGEN SATURATION: 100 % | WEIGHT: 160 LBS | RESPIRATION RATE: 16 BRPM | SYSTOLIC BLOOD PRESSURE: 116 MMHG | TEMPERATURE: 98 F | BODY MASS INDEX: 25.82 KG/M2 | DIASTOLIC BLOOD PRESSURE: 73 MMHG

## 2018-12-03 DIAGNOSIS — T81.49XA INCISIONAL ABSCESS: Primary | ICD-10-CM

## 2018-12-03 LAB
B-HCG UR QL: NEGATIVE
CTP QC/QA: YES

## 2018-12-03 PROCEDURE — 87077 CULTURE AEROBIC IDENTIFY: CPT

## 2018-12-03 PROCEDURE — 25000003 PHARM REV CODE 250: Performed by: EMERGENCY MEDICINE

## 2018-12-03 PROCEDURE — 81025 URINE PREGNANCY TEST: CPT | Performed by: EMERGENCY MEDICINE

## 2018-12-03 PROCEDURE — 87070 CULTURE OTHR SPECIMN AEROBIC: CPT

## 2018-12-03 PROCEDURE — 87186 SC STD MICRODIL/AGAR DIL: CPT

## 2018-12-03 PROCEDURE — 99283 EMERGENCY DEPT VISIT LOW MDM: CPT | Mod: 25

## 2018-12-03 RX ORDER — CEPHALEXIN 500 MG/1
500 CAPSULE ORAL 4 TIMES DAILY
Qty: 28 CAPSULE | Refills: 0 | Status: SHIPPED | OUTPATIENT
Start: 2018-12-03 | End: 2018-12-10

## 2018-12-03 RX ORDER — CEPHALEXIN 500 MG/1
500 CAPSULE ORAL
Status: COMPLETED | OUTPATIENT
Start: 2018-12-03 | End: 2018-12-03

## 2018-12-03 RX ORDER — CEPHALEXIN 500 MG/1
500 CAPSULE ORAL 4 TIMES DAILY
Qty: 28 CAPSULE | Refills: 0 | Status: SHIPPED | OUTPATIENT
Start: 2018-12-03 | End: 2018-12-03 | Stop reason: SDUPTHER

## 2018-12-03 RX ADMIN — CEPHALEXIN 500 MG: 500 CAPSULE ORAL at 12:12

## 2018-12-03 NOTE — ED PROVIDER NOTES
"Encounter Date: 12/3/2018    SCRIBE #1 NOTE: I, Janene Sol, am scribing for, and in the presence of, Dr. Buck.       History     Chief Complaint   Patient presents with    Wound Check     "knot" x 4 days underneath  incision from , now with drainage x 1 day. Denies fevers.      Time seen by provider: 11:53 AM    This is a 28 y.o. female who presents for wound check. Pt reports  section on 18 with Dr. Iqbal, with no complications. She states that she saw two "bumps" to the incision four days ago. One began to drain yesterday. She reports no change in size. She states there is no pain. She has no hx of skin infections. She reports no fever, chills, fatigue, N/V, abdominal pain, lightheadedness, dizziness, confusion, or urinary sx. She reports she has been fine post-op until noticing the knots.      The history is provided by the patient.     Review of patient's allergies indicates:  No Known Allergies  Past Medical History:   Diagnosis Date    Allergic      Past Surgical History:   Procedure Laterality Date     SECTION N/A 2018    Procedure:  SECTION;  Surgeon: Jesi Iqbal MD;  [facility];  Service: OB/GYN;  Laterality: N/A;     SECTION N/A 2018    Performed by Jesi Iqbal MD at [facility] L&D    NO PAST SURGERIES       Family History   Problem Relation Age of Onset    Breast cancer Neg Hx     Colon cancer Neg Hx     Ovarian cancer Neg Hx      Social History     Tobacco Use    Smoking status: Never Smoker    Smokeless tobacco: Never Used   Substance Use Topics    Alcohol use: No     Comment: rarely    Drug use: No     Review of Systems   Constitutional: Negative for chills, fatigue and fever.   HENT: Negative for congestion, rhinorrhea and sore throat.    Respiratory: Negative for cough and shortness of breath.    Cardiovascular: Negative for chest pain.   Gastrointestinal: Negative for abdominal pain, " diarrhea, nausea and vomiting.   Endocrine: Negative for polyuria.   Genitourinary: Negative for decreased urine volume, difficulty urinating, dysuria, frequency and urgency.   Musculoskeletal: Negative for back pain.   Skin: Negative for rash.        Two knots by  section incision to lower abdomen, one of which is burst.   Allergic/Immunologic: Negative for immunocompromised state.   Neurological: Negative for dizziness, weakness and light-headedness.   Hematological: Does not bruise/bleed easily.   Psychiatric/Behavioral: Negative for confusion.       Physical Exam     Initial Vitals [18 1119]   BP Pulse Resp Temp SpO2   118/77 65 16 97.7 °F (36.5 °C) 99 %      MAP       --         Physical Exam    Nursing note and vitals reviewed.  Constitutional: She appears well-developed and well-nourished. She is not diaphoretic. No distress.   HENT:   Head: Normocephalic and atraumatic.   Right Ear: External ear normal.   Left Ear: External ear normal.   Eyes: Right eye exhibits no discharge. Left eye exhibits no discharge.   Neck: Normal range of motion. Neck supple.   Cardiovascular: Normal rate, regular rhythm and normal heart sounds. Exam reveals no gallop and no friction rub.    No murmur heard.  Pulmonary/Chest: Breath sounds normal. No respiratory distress. She has no wheezes. She has no rhonchi. She has no rales.   Abdominal: Soft. Bowel sounds are normal. She exhibits no distension. There is no tenderness. There is no rebound and no guarding.   Musculoskeletal: Normal range of motion. She exhibits no edema or tenderness.   Lymphadenopathy:     She has no cervical adenopathy.   Neurological: She is alert and oriented to person, place, and time. She has normal strength. No sensory deficit.   Skin: Skin is warm and dry. No rash noted. No erythema.   Lower abdominal horizontal  scar with induration and purulent drainage R of midline. Also area of induration L of midline with no tenderness or  erythema.   Psychiatric: She has a normal mood and affect. Her behavior is normal.         ED Course   Procedures  Labs Reviewed   CULTURE, AEROBIC  (SPECIFY SOURCE)   POCT URINE PREGNANCY             Medical Decision Making:   Initial Assessment:       28F presents with 2 areas of swelling and induration over  incision x 4 days.  done in September with no complications since. Exam with purulent drainage of induration R of midline, and firm subcutaneous induration L of midline, both with no erythema or tenderness. Unlikely to have stitch abscess this far out from procedure, and no other clear source for apparent incisional infection. No systemic symptoms or fevers, and no h/o previous skin infections. No abdominal tenderness to suggest intra-abdominal infection.   Culture of purulent drainage sent. Discussed with GYN consult, they are about to go into OR and can see patient when they finish in about an hour. Patient states she has to go to work soon and cannot wait, so per GYN recs will start on Keflex and patient will f/u for wound check with GYN clinic in 2-3 days, which is reasonable since no fevers or tenderness. Patient comfortable with discharge plan and will return for any worsening sx or other concerns.    Clinical Tests:   Lab Tests: Ordered and Reviewed            Scribe Attestation:   Scribe #1: I performed the above scribed service and the documentation accurately describes the services I performed. I attest to the accuracy of the note.    Attending Attestation:           Physician Attestation for Scribe:  Physician Attestation Statement for Scribe #1: I, Dr. Buck, reviewed documentation, as scribed by Janene Sol in my presence, and it is both accurate and complete.                    Clinical Impression:     1. Incisional abscess                                 Heber Buck MD  [id number]

## 2018-12-04 ENCOUNTER — TELEPHONE (OUTPATIENT)
Dept: PHARMACY | Facility: CLINIC | Age: 28
End: 2018-12-04

## 2018-12-04 DIAGNOSIS — Z30.013 ENCOUNTER FOR INITIAL PRESCRIPTION OF INJECTABLE CONTRACEPTIVE: Primary | ICD-10-CM

## 2018-12-04 NOTE — TELEPHONE ENCOUNTER
Reached Pt. HIPAA verified. She does not want to move forward w/ MIRENA or any other IUD at this time. Notifying MDO of update.

## 2018-12-05 ENCOUNTER — TELEPHONE (OUTPATIENT)
Dept: OBSTETRICS AND GYNECOLOGY | Facility: CLINIC | Age: 28
End: 2018-12-05

## 2018-12-05 LAB — BACTERIA SPEC AEROBE CULT: NORMAL

## 2018-12-05 RX ORDER — MEDROXYPROGESTERONE ACETATE 150 MG/ML
150 INJECTION, SUSPENSION INTRAMUSCULAR
Qty: 1 ML | Refills: 3 | Status: SHIPPED | OUTPATIENT
Start: 2018-12-05 | End: 2019-09-29 | Stop reason: SDUPTHER

## 2018-12-06 ENCOUNTER — OFFICE VISIT (OUTPATIENT)
Dept: OBSTETRICS AND GYNECOLOGY | Facility: CLINIC | Age: 28
End: 2018-12-06
Payer: MEDICAID

## 2018-12-06 VITALS
HEIGHT: 66 IN | WEIGHT: 162.69 LBS | BODY MASS INDEX: 26.14 KG/M2 | DIASTOLIC BLOOD PRESSURE: 80 MMHG | SYSTOLIC BLOOD PRESSURE: 120 MMHG

## 2018-12-06 DIAGNOSIS — Z32.00 POSSIBLE PREGNANCY: ICD-10-CM

## 2018-12-06 LAB
B-HCG UR QL: NEGATIVE
CTP QC/QA: YES

## 2018-12-06 PROCEDURE — 81025 URINE PREGNANCY TEST: CPT | Mod: PBBFAC,PO | Performed by: OBSTETRICS & GYNECOLOGY

## 2018-12-06 PROCEDURE — 99212 OFFICE O/P EST SF 10 MIN: CPT | Mod: S$PBB,,, | Performed by: OBSTETRICS & GYNECOLOGY

## 2018-12-06 PROCEDURE — 99999 PR PBB SHADOW E&M-EST. PATIENT-LVL III: CPT | Mod: PBBFAC,,, | Performed by: OBSTETRICS & GYNECOLOGY

## 2018-12-06 PROCEDURE — 99213 OFFICE O/P EST LOW 20 MIN: CPT | Mod: PBBFAC,TH,PO | Performed by: OBSTETRICS & GYNECOLOGY

## 2018-12-06 NOTE — PROGRESS NOTES
"Chief Complaint   Patient presents with    Postpartum Care       HPI:  28 y.o. female  presents for a post-partum check-up    Patient's last menstrual period was 2018.    - Seen in ED on 12/3/2018 for drainage from her C/S incision  - Noted to have a small abscess  - Discharged on keflex for 7 days    Contraception: DEPO-PROVERA  Pap: 2017, NILM    Past Medical History:   Diagnosis Date    Allergic      Past Surgical History:   Procedure Laterality Date     SECTION N/A 2018    Performed by [facility] at [facility] L[facility]D    NO PAST SURGERIES         Social History     Tobacco Use    Smoking status: Never Smoker    Smokeless tobacco: Never Used   Substance Use Topics    Alcohol use: No     Comment: rarely     Family History   Problem Relation Age of Onset    Breast cancer Neg Hx     Colon cancer Neg Hx     Ovarian cancer Neg Hx      OB History    Para Term  AB Living   1 1 1 0 0 1   SAB TAB Ectopic Multiple Live Births   0 0 0 0 1      # Outcome Date GA Lbr Randell/2nd Weight Sex Delivery Anes PTL Lv   1 Term 18 41w0d  3.22 kg (7 lb 1.6 oz) F CS-LTranv EPI, Spinal N EFE      Complications: Fetal Intolerance          MEDICATIONS: Reviewed with patient.  ALLERGIES: Patient has no known allergies.     ROS:  Review of Systems   Constitutional: Negative for fever.   Respiratory: Negative for shortness of breath.    Cardiovascular: Negative for chest pain.   Gastrointestinal: Negative for abdominal pain, nausea and vomiting.   Genitourinary: Negative for menstrual problem.   Neurological: Negative for headaches.       PHYSICAL EXAM:    /80   Ht 5' 6" (1.676 m)   Wt 73.8 kg (162 lb 11.2 oz)   LMP 2018   BMI 26.26 kg/m²     Physical Exam:   Constitutional: She is oriented to person, place, and time. She appears well-developed.    HENT:   Head: Normocephalic.       Pulmonary/Chest: Effort normal.        Abdominal: She exhibits no distension and no " mass. There is no hepatosplenomegaly. There is no tenderness. No hernia.   Incsion: 1-cm area of swelling with drainage                 Neurological: She is alert and oriented to person, place, and time.     Psychiatric: She has a normal mood and affect.         ASSESSMENT & PLAN:   Infection of obstetric surgical wound, superficial incisional site    Possible pregnancy  -     POCT Urine Pregnancy        - 1-cm incisional swelling with drainage  - Minimal purulence expressed from site  - Encouraged patient to complete antibiotics  - RTC if area does not improve

## 2019-05-04 ENCOUNTER — HOSPITAL ENCOUNTER (EMERGENCY)
Facility: OTHER | Age: 29
Discharge: HOME | End: 2019-05-04
Attending: EMERGENCY MEDICINE
Payer: MEDICAID

## 2019-05-04 VITALS
BODY MASS INDEX: 25.71 KG/M2 | TEMPERATURE: 99 F | RESPIRATION RATE: 16 BRPM | WEIGHT: 160 LBS | OXYGEN SATURATION: 98 % | HEIGHT: 66 IN | SYSTOLIC BLOOD PRESSURE: 123 MMHG | DIASTOLIC BLOOD PRESSURE: 79 MMHG | HEART RATE: 83 BPM

## 2019-05-04 DIAGNOSIS — Z20.7 SCABIES EXPOSURE: ICD-10-CM

## 2019-05-04 DIAGNOSIS — J06.9 VIRAL URI WITH COUGH: Primary | ICD-10-CM

## 2019-05-04 PROCEDURE — 99284 EMERGENCY DEPT VISIT MOD MDM: CPT

## 2019-05-04 RX ORDER — PERMETHRIN 50 MG/G
CREAM TOPICAL
Qty: 60 G | Refills: 0 | OUTPATIENT
Start: 2019-05-04 | End: 2019-05-15

## 2019-05-04 RX ORDER — BENZONATATE 100 MG/1
100 CAPSULE ORAL 3 TIMES DAILY PRN
Qty: 15 CAPSULE | Refills: 0 | Status: SHIPPED | OUTPATIENT
Start: 2019-05-04 | End: 2019-05-09

## 2019-05-04 RX ORDER — GUAIFENESIN/DEXTROMETHORPHAN 100-10MG/5
5 SYRUP ORAL NIGHTLY PRN
Qty: 120 ML | Refills: 0 | Status: SHIPPED | OUTPATIENT
Start: 2019-05-04 | End: 2019-05-09

## 2019-05-05 NOTE — ED NOTES
Pt c/o cough x several days without fever, chills, or SOB. Pt AAOx4 and appropriate at this time. Respirations even and unlabored. No acute distress noted.

## 2019-05-05 NOTE — ED NOTES
Appearance: Pt awake, alert & oriented to person, place & time. Pt in no acute distress at present time. Pt is clean and well groomed with clothes appropriately fastened.   Skin: Skin warm, dry & intact. Color consistent with ethnicity. Mucous membranes moist. No breakdown or brusing noted.   Musculoskeletal: Patient moving all extremities well, no obvious swelling or deformities noted.   Respiratory: Respirations spontaneous, even, and non-labored. Visible chest rise noted. Airway is open and patent. No accessory muscle use noted. Nonproductive cough x several days, mild expiratory wheezing heard upon auscultation.   Neurologic: Sensation is intact. Speech is clear and appropriate. Eyes open spontaneously, behavior appropriate to situation, follows commands,  purposeful motor response noted.   Cardiac:  No Bilateral lower extremity edema. Cap refill is <3 seconds. Pt denies active chest pains, SOB, dizziness, blurred vision, weakness or fatigue at this time.

## 2019-05-15 ENCOUNTER — HOSPITAL ENCOUNTER (EMERGENCY)
Facility: OTHER | Age: 29
Discharge: HOME | End: 2019-05-15
Attending: EMERGENCY MEDICINE
Payer: MEDICAID

## 2019-05-15 VITALS
TEMPERATURE: 99 F | RESPIRATION RATE: 18 BRPM | SYSTOLIC BLOOD PRESSURE: 117 MMHG | HEART RATE: 74 BPM | DIASTOLIC BLOOD PRESSURE: 72 MMHG | BODY MASS INDEX: 25.71 KG/M2 | HEIGHT: 66 IN | WEIGHT: 160 LBS | OXYGEN SATURATION: 98 %

## 2019-05-15 DIAGNOSIS — R09.82 PND (POST-NASAL DRIP): ICD-10-CM

## 2019-05-15 DIAGNOSIS — R07.9 CHEST PAIN: ICD-10-CM

## 2019-05-15 DIAGNOSIS — R05.9 COUGH: ICD-10-CM

## 2019-05-15 DIAGNOSIS — J30.9 ALLERGIC RHINITIS, UNSPECIFIED SEASONALITY, UNSPECIFIED TRIGGER: ICD-10-CM

## 2019-05-15 DIAGNOSIS — R07.89 BURNING CHEST PAIN: Primary | ICD-10-CM

## 2019-05-15 LAB
B-HCG UR QL: NEGATIVE
CTP QC/QA: YES

## 2019-05-15 PROCEDURE — 93005 ELECTROCARDIOGRAM TRACING: CPT

## 2019-05-15 PROCEDURE — 93010 ELECTROCARDIOGRAM REPORT: CPT | Mod: ,,, | Performed by: INTERNAL MEDICINE

## 2019-05-15 PROCEDURE — 93010 EKG 12-LEAD: ICD-10-PCS | Mod: ,,, | Performed by: INTERNAL MEDICINE

## 2019-05-15 PROCEDURE — 25000003 PHARM REV CODE 250: Performed by: PHYSICIAN ASSISTANT

## 2019-05-15 PROCEDURE — 81025 URINE PREGNANCY TEST: CPT | Performed by: EMERGENCY MEDICINE

## 2019-05-15 PROCEDURE — 99284 EMERGENCY DEPT VISIT MOD MDM: CPT | Mod: 25

## 2019-05-15 RX ORDER — BENZONATATE 200 MG/1
200 CAPSULE ORAL 3 TIMES DAILY PRN
Qty: 20 CAPSULE | Refills: 0 | Status: SHIPPED | OUTPATIENT
Start: 2019-05-15 | End: 2019-05-25

## 2019-05-15 RX ORDER — FLUTICASONE PROPIONATE 50 MCG
1 SPRAY, SUSPENSION (ML) NASAL 2 TIMES DAILY PRN
Qty: 15 G | Refills: 0 | Status: SHIPPED | OUTPATIENT
Start: 2019-05-15

## 2019-05-15 RX ORDER — FAMOTIDINE 20 MG/1
20 TABLET, FILM COATED ORAL 2 TIMES DAILY
Qty: 20 TABLET | Refills: 0 | Status: SHIPPED | OUTPATIENT
Start: 2019-05-15 | End: 2020-05-14

## 2019-05-15 RX ORDER — CETIRIZINE HYDROCHLORIDE 10 MG/1
10 TABLET ORAL DAILY
Qty: 30 TABLET | Refills: 0 | Status: SHIPPED | OUTPATIENT
Start: 2019-05-15 | End: 2020-05-14

## 2019-05-15 RX ADMIN — LIDOCAINE HYDROCHLORIDE: 20 SOLUTION ORAL; TOPICAL at 10:05

## 2019-05-15 NOTE — ED PROVIDER NOTES
"Encounter Date: 5/15/2019       History     Chief Complaint   Patient presents with    Heartburn     burning chest pain worse after eating     28-year-old female with history of seasonal allergies and postnasal drip presents to the emergency department with complaints of burning in her chest.  She states it has been present for the last 2 days after eating.  She admits that it started after she took antibiotics for her tooth.  She also states that she is still experiencing a cough, postnasal drip and nasal congestion.  She states that she has been taking Robitussin with no relief as well as other over-the-counter medications including "Vicks."  She complains of pain at an 8/10. She denies shortness of breath, fever, chills, nausea or vomiting. She describes it as a burning pain like heartburn.  She states that it worsened after eating lasagna, spaghetti and meatballs and drinking Mountain Dew.  No current treatment at this time.    The history is provided by the patient.     Review of patient's allergies indicates:  No Known Allergies  Past Medical History:   Diagnosis Date    Allergic      Past Surgical History:   Procedure Laterality Date     SECTION N/A 2018    Performed [facility] at [facility]    NO PAST SURGERIES       Family History   Problem Relation Age of Onset    Breast cancer Neg Hx     Colon cancer Neg Hx     Ovarian cancer Neg Hx      Social History     Tobacco Use    Smoking status: Never Smoker    Smokeless tobacco: Never Used   Substance Use Topics    Alcohol use: Yes     Comment: rarely    Drug use: No     Review of Systems   Constitutional: Negative for chills and fever.   HENT: Positive for congestion and postnasal drip. Negative for sore throat and trouble swallowing.    Respiratory: Positive for cough. Negative for shortness of breath.    Cardiovascular: Positive for chest pain.   Gastrointestinal: Negative for abdominal pain, nausea and vomiting. "   Genitourinary: Negative for dysuria.   Musculoskeletal: Negative for back pain.   Skin: Negative for rash.   Neurological: Negative for weakness.   Hematological: Does not bruise/bleed easily.       Physical Exam     Initial Vitals [05/15/19 0927]   BP Pulse Resp Temp SpO2   129/64 73 16 99.2 °F (37.3 °C) 99 %      MAP       --         Physical Exam    Nursing note and vitals reviewed.  Constitutional: Vital signs are normal. She appears well-developed and well-nourished. She is not diaphoretic.  Non-toxic appearance. No distress.   HENT:   Head: Normocephalic and atraumatic.   Right Ear: External ear normal.   Left Ear: External ear normal.   Nose: Nose normal.   Mouth/Throat: Oropharynx is clear and moist. No oropharyngeal exudate.   Eyes: Conjunctivae and lids are normal. No scleral icterus.   Neck: Normal range of motion and phonation normal. Neck supple.   Cardiovascular: Normal rate, regular rhythm and normal heart sounds. Exam reveals no gallop and no friction rub.    No murmur heard.  Pulmonary/Chest: Effort normal and breath sounds normal. No respiratory distress. She has no decreased breath sounds. She has no wheezes. She has no rhonchi. She has no rales.   Abdominal: Normal appearance.   Musculoskeletal: Normal range of motion.   No obvious deformities, moving all extremities, normal gait   Neurological: She is alert and oriented to person, place, and time. She has normal strength. No sensory deficit.   Skin: Skin is warm, dry and intact. No lesion and no rash noted. No erythema.   Psychiatric: She has a normal mood and affect. Her speech is normal and behavior is normal. Judgment normal. Cognition and memory are normal.         ED Course   Procedures  Labs Reviewed   POCT URINE PREGNANCY     EKG Readings: (Independently Interpreted)   Initial Reading: No STEMI. Rhythm: Normal Sinus Rhythm. Heart Rate: 61. Ectopy: No Ectopy. Conduction: Normal. ST Segments: Normal ST Segments. T Waves: Normal. Clinical  Impression: Normal Sinus Rhythm       Imaging Results    None          Medical Decision Making:   History:   Old Medical Records: I decided to obtain old medical records.  Initial Assessment:   28-year-old female with complaints consistent with burning chest pain likely secondary to GERD/reflux.  Vital signs stable, afebrile, neurovascularly intact.  She is alert and healthy and nontoxic appearing.  She is in no apparent distress. No focal neurological deficits.  Exam documented above.  The pain is not reproducible.  She states that is worse after eating.  I did consider possible he pill esophagitis as symptoms started after taking antibiotics for dental infection.  She also complains of postnasal drip, congestion and cough.  Lungs are clear to auscultation. I do not suspect pneumonia or serious bacterial infection.  Her cough could potentially also be due secondary to GERD.  Clinical Tests:   Lab Tests: Reviewed  The following lab test(s) were unremarkable: UPT  ED Management:  UPT obtained from triage and negative. EKG obtained and independently interpreted by myself.  Consistent with normal sinus rhythm with a rate of 61 beats per minute.  No evidence of acute ischemia or STEMI.  She was administered a GI cocktail.  Will discharge home with Pepcid as well as Flonase, Zyrtec and Tessalon Perles.  She is urged close follow-up with primary care physician and [facility].  She is to return to the emergency department for any worsening signs or symptoms. She states understanding agrees with this plan.  This is the extent of patient's complaints today.  This note was created using [employer] dictation.  There may be typographical errors secondary to dictation.                        Clinical Impression:     1. Burning chest pain    2. Chest pain    3. PND (post-nasal drip)    4. Allergic rhinitis, unspecified seasonality, unspecified trigger    5. Cough            Disposition:    Disposition: Discharged  Condition: Stable                        Shobha Castellano PA-C  05/15/19 1023

## 2019-05-15 NOTE — ED NOTES
"Pt denies PMH. C/o "chest burning every time I eat" x several days. Denies SOB. Pt AAOx4 and appropriate at this time. Respirations even and unlabored. No acute distress noted.    "

## 2019-05-15 NOTE — ED NOTES
Appearance: Pt awake, alert & oriented to person, place & time. Pt in no acute distress at present time. Pt is clean and well groomed with clothes appropriately fastened. Pt reporting burning to midsternal chest after eating x several days.   Skin: Skin warm, dry & intact. Color consistent with ethnicity. Mucous membranes moist. No breakdown or brusing noted.   Musculoskeletal: Patient moving all extremities well, no obvious swelling or deformities noted.   Respiratory: Respirations spontaneous, even, and non-labored. Visible chest rise noted. Airway is open and patent. No accessory muscle use noted.   Neurologic: Sensation is intact. Speech is clear and appropriate. Eyes open spontaneously, behavior appropriate to situation, follows commands,  purposeful motor response noted.   Cardiac:  No Bilateral lower extremity edema. Cap refill is <3 seconds. Pt denies  SOB, dizziness, blurred vision, weakness or fatigue at this time.   Abdomen: Pt denies active abd pains, cramping or discomfort, No N/V/D at this time.

## 2019-09-29 DIAGNOSIS — Z30.013 ENCOUNTER FOR INITIAL PRESCRIPTION OF INJECTABLE CONTRACEPTIVE: ICD-10-CM

## 2019-10-01 RX ORDER — MEDROXYPROGESTERONE ACETATE 150 MG/ML
150 INJECTION, SUSPENSION INTRAMUSCULAR
Qty: 1 SYRINGE | Refills: 3 | Status: SHIPPED | OUTPATIENT
Start: 2019-10-01 | End: 2021-04-05 | Stop reason: SDUPTHER

## 2019-10-05 DIAGNOSIS — Z30.013 ENCOUNTER FOR INITIAL PRESCRIPTION OF INJECTABLE CONTRACEPTIVE: ICD-10-CM

## 2019-10-09 RX ORDER — MEDROXYPROGESTERONE ACETATE 150 MG/ML
150 INJECTION, SUSPENSION INTRAMUSCULAR
Qty: 1 SYRINGE | Refills: 3 | OUTPATIENT
Start: 2019-10-09 | End: 2020-05-07

## 2020-09-14 ENCOUNTER — TELEPHONE (OUTPATIENT)
Dept: OBSTETRICS AND GYNECOLOGY | Facility: CLINIC | Age: 30
End: 2020-09-14

## 2020-09-17 DIAGNOSIS — Z30.013 ENCOUNTER FOR INITIAL PRESCRIPTION OF INJECTABLE CONTRACEPTIVE: ICD-10-CM

## 2020-09-18 RX ORDER — MEDROXYPROGESTERONE ACETATE 150 MG/ML
150 INJECTION, SUSPENSION INTRAMUSCULAR
Qty: 1 SYRINGE | Refills: 3 | OUTPATIENT
Start: 2020-09-18 | End: 2021-06-16

## 2020-09-18 NOTE — TELEPHONE ENCOUNTER
----- Message from Svetlana Mcallister sent at 9/18/2020  9:19 AM CDT -----  Pt needs to talk to Doctor about her birth control. Pt can be reached at [phone].

## 2021-02-23 ENCOUNTER — HOSPITAL ENCOUNTER (EMERGENCY)
Facility: OTHER | Age: 31
Discharge: HOME | End: 2021-02-23
Attending: EMERGENCY MEDICINE
Payer: MEDICAID

## 2021-02-23 VITALS
SYSTOLIC BLOOD PRESSURE: 133 MMHG | DIASTOLIC BLOOD PRESSURE: 76 MMHG | HEART RATE: 96 BPM | OXYGEN SATURATION: 100 % | TEMPERATURE: 98 F | RESPIRATION RATE: 18 BRPM

## 2021-02-23 DIAGNOSIS — R00.0 TACHYCARDIA: ICD-10-CM

## 2021-02-23 DIAGNOSIS — T50.905A ADVERSE EFFECT OF DRUG, INITIAL ENCOUNTER: Primary | ICD-10-CM

## 2021-02-23 LAB
B-HCG UR QL: NEGATIVE
CTP QC/QA: YES

## 2021-02-23 PROCEDURE — 96374 THER/PROPH/DIAG INJ IV PUSH: CPT

## 2021-02-23 PROCEDURE — 93010 ELECTROCARDIOGRAM REPORT: CPT | Mod: ,,, | Performed by: INTERNAL MEDICINE

## 2021-02-23 PROCEDURE — 81025 URINE PREGNANCY TEST: CPT | Performed by: EMERGENCY MEDICINE

## 2021-02-23 PROCEDURE — 99284 EMERGENCY DEPT VISIT MOD MDM: CPT | Mod: 25

## 2021-02-23 PROCEDURE — 25000003 PHARM REV CODE 250: Performed by: EMERGENCY MEDICINE

## 2021-02-23 PROCEDURE — 63600175 PHARM REV CODE 636 W HCPCS: Performed by: EMERGENCY MEDICINE

## 2021-02-23 PROCEDURE — 93010 EKG 12-LEAD: ICD-10-PCS | Mod: ,,, | Performed by: INTERNAL MEDICINE

## 2021-02-23 PROCEDURE — 96375 TX/PRO/DX INJ NEW DRUG ADDON: CPT

## 2021-02-23 PROCEDURE — 96361 HYDRATE IV INFUSION ADD-ON: CPT

## 2021-02-23 PROCEDURE — 93005 ELECTROCARDIOGRAM TRACING: CPT

## 2021-02-23 RX ORDER — METHYLPREDNISOLONE SOD SUCC 125 MG
125 VIAL (EA) INJECTION
Status: COMPLETED | OUTPATIENT
Start: 2021-02-23 | End: 2021-02-23

## 2021-02-23 RX ORDER — ONDANSETRON 2 MG/ML
4 INJECTION INTRAMUSCULAR; INTRAVENOUS
Status: COMPLETED | OUTPATIENT
Start: 2021-02-23 | End: 2021-02-23

## 2021-02-23 RX ORDER — DIPHENHYDRAMINE HYDROCHLORIDE 50 MG/ML
25 INJECTION INTRAMUSCULAR; INTRAVENOUS
Status: COMPLETED | OUTPATIENT
Start: 2021-02-23 | End: 2021-02-23

## 2021-02-23 RX ADMIN — SODIUM CHLORIDE 1000 ML: 0.9 INJECTION, SOLUTION INTRAVENOUS at 09:02

## 2021-02-23 RX ADMIN — DIPHENHYDRAMINE HYDROCHLORIDE 25 MG: 50 INJECTION INTRAMUSCULAR; INTRAVENOUS at 09:02

## 2021-02-23 RX ADMIN — ONDANSETRON 4 MG: 2 INJECTION INTRAMUSCULAR; INTRAVENOUS at 09:02

## 2021-02-23 RX ADMIN — METHYLPREDNISOLONE SODIUM SUCCINATE 125 MG: 125 INJECTION, POWDER, FOR SOLUTION INTRAMUSCULAR; INTRAVENOUS at 09:02

## 2021-05-16 ENCOUNTER — HOSPITAL ENCOUNTER (EMERGENCY)
Facility: OTHER | Age: 31
Discharge: HOME | End: 2021-05-16
Attending: EMERGENCY MEDICINE
Payer: MEDICAID

## 2021-05-16 VITALS
RESPIRATION RATE: 19 BRPM | HEART RATE: 64 BPM | BODY MASS INDEX: 27.15 KG/M2 | DIASTOLIC BLOOD PRESSURE: 78 MMHG | HEIGHT: 67 IN | TEMPERATURE: 99 F | WEIGHT: 173 LBS | OXYGEN SATURATION: 99 % | SYSTOLIC BLOOD PRESSURE: 131 MMHG

## 2021-05-16 DIAGNOSIS — K59.00 CONSTIPATION, UNSPECIFIED CONSTIPATION TYPE: Primary | ICD-10-CM

## 2021-05-16 DIAGNOSIS — R53.1 WEAKNESS: ICD-10-CM

## 2021-05-16 DIAGNOSIS — R10.9 ABDOMINAL PAIN: ICD-10-CM

## 2021-05-16 DIAGNOSIS — R20.2 PARESTHESIA: ICD-10-CM

## 2021-05-16 LAB
ALBUMIN SERPL BCP-MCNC: 3.6 G/DL (ref 3.5–5.2)
ALP SERPL-CCNC: 91 U/L (ref 55–135)
ALT SERPL W/O P-5'-P-CCNC: 20 U/L (ref 10–44)
ANION GAP SERPL CALC-SCNC: 9 MMOL/L (ref 8–16)
AST SERPL-CCNC: 16 U/L (ref 10–40)
B-HCG UR QL: NEGATIVE
BASOPHILS # BLD AUTO: 0.03 K/UL (ref 0–0.2)
BASOPHILS NFR BLD: 0.4 % (ref 0–1.9)
BILIRUB SERPL-MCNC: 0.2 MG/DL (ref 0.1–1)
BILIRUB UR QL STRIP: NEGATIVE
BUN SERPL-MCNC: 13 MG/DL (ref 6–20)
CALCIUM SERPL-MCNC: 8.9 MG/DL (ref 8.7–10.5)
CHLORIDE SERPL-SCNC: 107 MMOL/L (ref 95–110)
CK SERPL-CCNC: 205 U/L (ref 20–180)
CLARITY UR: CLEAR
CO2 SERPL-SCNC: 25 MMOL/L (ref 23–29)
COLOR UR: YELLOW
CREAT SERPL-MCNC: 0.9 MG/DL (ref 0.5–1.4)
CTP QC/QA: YES
DIFFERENTIAL METHOD: ABNORMAL
EOSINOPHIL # BLD AUTO: 0.1 K/UL (ref 0–0.5)
EOSINOPHIL NFR BLD: 1.8 % (ref 0–8)
ERYTHROCYTE [DISTWIDTH] IN BLOOD BY AUTOMATED COUNT: 12.9 % (ref 11.5–14.5)
EST. GFR  (AFRICAN AMERICAN): >60 ML/MIN/1.73 M^2
EST. GFR  (NON AFRICAN AMERICAN): >60 ML/MIN/1.73 M^2
GLUCOSE SERPL-MCNC: 95 MG/DL (ref 70–110)
GLUCOSE UR QL STRIP: NEGATIVE
HCT VFR BLD AUTO: 42 % (ref 37–48.5)
HCV AB SERPL QL IA: NEGATIVE
HGB BLD-MCNC: 13.7 G/DL (ref 12–16)
HGB UR QL STRIP: NEGATIVE
HIV 1+2 AB+HIV1 P24 AG SERPL QL IA: NEGATIVE
IMM GRANULOCYTES # BLD AUTO: 0.02 K/UL (ref 0–0.04)
IMM GRANULOCYTES NFR BLD AUTO: 0.3 % (ref 0–0.5)
KETONES UR QL STRIP: NEGATIVE
LEUKOCYTE ESTERASE UR QL STRIP: NEGATIVE
LYMPHOCYTES # BLD AUTO: 1.8 K/UL (ref 1–4.8)
LYMPHOCYTES NFR BLD: 25.5 % (ref 18–48)
MAGNESIUM SERPL-MCNC: 2 MG/DL (ref 1.6–2.6)
MCH RBC QN AUTO: 28.5 PG (ref 27–31)
MCHC RBC AUTO-ENTMCNC: 32.6 G/DL (ref 32–36)
MCV RBC AUTO: 87 FL (ref 82–98)
MONOCYTES # BLD AUTO: 0.7 K/UL (ref 0.3–1)
MONOCYTES NFR BLD: 9.7 % (ref 4–15)
NEUTROPHILS # BLD AUTO: 4.4 K/UL (ref 1.8–7.7)
NEUTROPHILS NFR BLD: 62.3 % (ref 38–73)
NITRITE UR QL STRIP: NEGATIVE
NRBC BLD-RTO: 0 /100 WBC
PH UR STRIP: 7 [PH] (ref 5–8)
PLATELET # BLD AUTO: 245 K/UL (ref 150–450)
PMV BLD AUTO: 9.1 FL (ref 9.2–12.9)
POTASSIUM SERPL-SCNC: 3.6 MMOL/L (ref 3.5–5.1)
PROT SERPL-MCNC: 7.5 G/DL (ref 6–8.4)
PROT UR QL STRIP: NEGATIVE
RBC # BLD AUTO: 4.81 M/UL (ref 4–5.4)
SODIUM SERPL-SCNC: 141 MMOL/L (ref 136–145)
SP GR UR STRIP: 1.02 (ref 1–1.03)
URN SPEC COLLECT METH UR: NORMAL
UROBILINOGEN UR STRIP-ACNC: 1 EU/DL
WBC # BLD AUTO: 7.1 K/UL (ref 3.9–12.7)

## 2021-05-16 PROCEDURE — 96374 THER/PROPH/DIAG INJ IV PUSH: CPT

## 2021-05-16 PROCEDURE — 82550 ASSAY OF CK (CPK): CPT | Performed by: EMERGENCY MEDICINE

## 2021-05-16 PROCEDURE — 99285 EMERGENCY DEPT VISIT HI MDM: CPT | Mod: 25

## 2021-05-16 PROCEDURE — 80053 COMPREHEN METABOLIC PANEL: CPT | Performed by: EMERGENCY MEDICINE

## 2021-05-16 PROCEDURE — 93010 ELECTROCARDIOGRAM REPORT: CPT | Mod: ,,, | Performed by: INTERNAL MEDICINE

## 2021-05-16 PROCEDURE — 93010 EKG 12-LEAD: ICD-10-PCS | Mod: ,,, | Performed by: INTERNAL MEDICINE

## 2021-05-16 PROCEDURE — 81025 URINE PREGNANCY TEST: CPT | Performed by: EMERGENCY MEDICINE

## 2021-05-16 PROCEDURE — 81003 URINALYSIS AUTO W/O SCOPE: CPT | Performed by: EMERGENCY MEDICINE

## 2021-05-16 PROCEDURE — 63600175 PHARM REV CODE 636 W HCPCS: Performed by: EMERGENCY MEDICINE

## 2021-05-16 PROCEDURE — 86703 HIV-1/HIV-2 1 RESULT ANTBDY: CPT | Performed by: EMERGENCY MEDICINE

## 2021-05-16 PROCEDURE — 86803 HEPATITIS C AB TEST: CPT | Performed by: EMERGENCY MEDICINE

## 2021-05-16 PROCEDURE — 83735 ASSAY OF MAGNESIUM: CPT | Performed by: EMERGENCY MEDICINE

## 2021-05-16 PROCEDURE — 25000003 PHARM REV CODE 250: Performed by: EMERGENCY MEDICINE

## 2021-05-16 PROCEDURE — 85025 COMPLETE CBC W/AUTO DIFF WBC: CPT | Performed by: EMERGENCY MEDICINE

## 2021-05-16 PROCEDURE — 93005 ELECTROCARDIOGRAM TRACING: CPT

## 2021-05-16 PROCEDURE — 96361 HYDRATE IV INFUSION ADD-ON: CPT

## 2021-05-16 RX ORDER — KETOROLAC TROMETHAMINE 30 MG/ML
30 INJECTION, SOLUTION INTRAMUSCULAR; INTRAVENOUS
Status: COMPLETED | OUTPATIENT
Start: 2021-05-16 | End: 2021-05-16

## 2021-05-16 RX ORDER — POLYETHYLENE GLYCOL 3350 17 G/17G
17 POWDER, FOR SOLUTION ORAL 2 TIMES DAILY PRN
Qty: 24 PACKET | Refills: 0 | Status: SHIPPED | OUTPATIENT
Start: 2021-05-16

## 2021-05-16 RX ADMIN — KETOROLAC TROMETHAMINE 30 MG: 30 INJECTION, SOLUTION INTRAMUSCULAR; INTRAVENOUS at 09:05

## 2021-05-16 RX ADMIN — SODIUM CHLORIDE 1000 ML: 0.9 INJECTION, SOLUTION INTRAVENOUS at 09:05

## 2021-07-30 ENCOUNTER — HOSPITAL ENCOUNTER (EMERGENCY)
Facility: OTHER | Age: 31
Discharge: HOME | End: 2021-07-30
Attending: EMERGENCY MEDICINE
Payer: MEDICAID

## 2021-07-30 VITALS
HEART RATE: 94 BPM | HEIGHT: 67 IN | SYSTOLIC BLOOD PRESSURE: 125 MMHG | RESPIRATION RATE: 18 BRPM | OXYGEN SATURATION: 99 % | TEMPERATURE: 99 F | DIASTOLIC BLOOD PRESSURE: 70 MMHG | WEIGHT: 171 LBS | BODY MASS INDEX: 26.84 KG/M2

## 2021-07-30 DIAGNOSIS — Z20.822 CLOSE EXPOSURE TO COVID-19 VIRUS: Primary | ICD-10-CM

## 2021-07-30 LAB
CTP QC/QA: YES
SARS-COV-2 RDRP RESP QL NAA+PROBE: NEGATIVE

## 2021-07-30 PROCEDURE — 99282 EMERGENCY DEPT VISIT SF MDM: CPT

## 2021-07-30 PROCEDURE — U0002 COVID-19 LAB TEST NON-CDC: HCPCS | Performed by: EMERGENCY MEDICINE

## 2021-12-29 ENCOUNTER — HOSPITAL ENCOUNTER (EMERGENCY)
Facility: OTHER | Age: 31
Discharge: HOME | End: 2021-12-29
Attending: EMERGENCY MEDICINE
Payer: MEDICAID

## 2021-12-29 VITALS
OXYGEN SATURATION: 100 % | HEART RATE: 71 BPM | TEMPERATURE: 99 F | DIASTOLIC BLOOD PRESSURE: 76 MMHG | RESPIRATION RATE: 20 BRPM | BODY MASS INDEX: 26.63 KG/M2 | WEIGHT: 170 LBS | SYSTOLIC BLOOD PRESSURE: 130 MMHG

## 2021-12-29 DIAGNOSIS — R05.9 COUGH: ICD-10-CM

## 2021-12-29 DIAGNOSIS — U07.1 COVID-19: Primary | ICD-10-CM

## 2021-12-29 LAB
CTP QC/QA: YES
SARS-COV-2 RDRP RESP QL NAA+PROBE: POSITIVE

## 2021-12-29 PROCEDURE — 99282 EMERGENCY DEPT VISIT SF MDM: CPT | Mod: 25

## 2021-12-29 PROCEDURE — 99282 PR EMERGENCY DEPT VISIT,LEVEL II: ICD-10-PCS | Mod: CS,,, | Performed by: NURSE PRACTITIONER

## 2021-12-29 PROCEDURE — 99282 EMERGENCY DEPT VISIT SF MDM: CPT | Mod: CS,,, | Performed by: NURSE PRACTITIONER

## 2021-12-29 PROCEDURE — U0002 COVID-19 LAB TEST NON-CDC: HCPCS | Performed by: EMERGENCY MEDICINE

## 2021-12-29 NOTE — Clinical Note
"Ken"Concha Bills was seen and treated in our emergency department on 12/29/2021.  She may return to work on 01/01/2022.  was seen and treated in our emergency department on 12/29/2021. COVID-19 is present in our communities across the state. She tested positive for Covid-19. Per CDC guidelines, she is to isolate for 5 days from the onset of symptoms. She was instructed to wear a mask for an additional 5 days when in public       If you have any questions or concerns, please don't hesitate to call.      Daya Larios NP"

## 2021-12-29 NOTE — ED PROVIDER NOTES
CHIEF COMPLAINT:   Chief Complaint   Patient presents with    COVID-19 Concerns     Pt c/o congestion and cough. States  tested positive.       HISTORY OF PRESENT ILLNESS: Ken Bills who is a 31 y.o. otherwise healthy female presents to the emergency department today with complaint of congestion, cough, runny nose that started on Monday, 12/27/21. She states that her  tested + for Covid last night. She denies fever, SOB, chest pain, N/V, diarrhea, sore throat.     REVIEW OF SYSTEMS:  Constitutional: no fever, no chills.  Eyes: No discharge. No pain.  HENT: + nasal congestion, no anosmia; No sore throat.   Cardiovascular: No chest pain, no palpitations.  Respiratory: + cough, no shortness of breath.  Gastrointestinal: no nausea, no diarrhea, No abdominal pain, no vomiting.   Genitourinary: No hematuria, dysuria, urgency.  Musculoskeletal: No back pain.  Skin: No rashes, no lesions.  Neurological: No headache, no focal weakness.    Otherwise remaining ROS negative     ALLERGIES REVIEWED  MEDICATIONS REVIEWED  PMH/PSH/SOC/FH REVIEWED     The history is provided by the patient.    Nursing/Ancillary staff note reviewed.        PHYSICAL EXAM:  VS reviewed  Vitals:    12/29/21 1213   BP: 130/76   Pulse: 71   Resp: 20   Temp: 98.5 °F (36.9 °C)       General Appearance: The patient is alert, has no immediate or signs of toxicity. No acute distress.    HEENT: Eyes: Pupils equal; Extra ocular movements intact. No drainage.   Neck:Neck is supple non-tender. No lymphadenopathy. No stridor.   Respiratory: There are no retractions, lungs are clear to auscultation. No wheezing, no crackles. Chest wall nontender to palpation, + congestion.   Cardiovascular: Regular rate and rhythm. No murmurs, rubs or gallops.  Gastrointestinal:  Abdomen is soft and non-tender, No guarding, no rebound.  No pulsatile mass.   Neurological: Alert and oriented x 4. No focal weakness. Strength intact 5/5 bilaterally in upper  and lower extremities.   Skin: Warm and dry, no rashes.  Musculoskeletal: Extremities are non-tender, non-swollen and have full range of motion.      Past Medical History:   Diagnosis Date    Allergic          Past Surgical History:   Procedure Laterality Date     SECTION N/A 2018    Procedure:  SECTION;  Surgeon: Jesi Iqbal MD;  Location: [facility] L&D;  Service: OB/GYN;  Laterality: N/A;    NO PAST SURGERIES           ED COURSE:     Patient presenting with general illness symptoms; appears well and nontoxic. Exam grossly unremarkable at this time.    DIFFERENTIAL DIAGNOSIS: After history and physical exam a differential diagnosis was considered, but was not limited to,   Sepsis, meningitis, otitis media/external, nasal polyp, bacterial sinusitis, allergic rhinitis, influenza, COVID19, bacterial/viral pharyngitis, bacterial/viral pneumonia.    ED management: Patient seen for a viral-like illness, therefore due to the most recent recommendations from our hospital administrations/infectious disease at this time, the patient will be swabbed for COVID 19. Rapid COVID is positive. Ambulatory trial reveals SPO2 97%. Instructed patient on symptomatic treatment and increase oral hydration. Vital signs did not indicate sepsis and patient was welling appearing, okay for discharge home.    IMPRESSION  The primary encounter diagnosis was COVID-19. A diagnosis of Cough was also pertinent to this visit. Strict instructions to follow up with primary care physician or reference provided for further assessment and evaluation. Given instructions to return for any acute symptoms and verbalized understanding of this medical plan.    I will also enroll the patient in the Covid-19 Home Monitoring symptoms and supply her with a Pulse Ox for home use.     Additionally, the patient was instructed to return to the ED with any worsening symptoms including shortness of breath or chest pain. Patient verbalized  understanding.                               Daya Larios, JAYA  12/29/21 7324

## 2022-05-09 ENCOUNTER — HOSPITAL ENCOUNTER (EMERGENCY)
Facility: OTHER | Age: 32
Discharge: HOME | End: 2022-05-09
Attending: EMERGENCY MEDICINE
Payer: MEDICAID

## 2022-05-09 VITALS — OXYGEN SATURATION: 100 % | DIASTOLIC BLOOD PRESSURE: 92 MMHG | HEART RATE: 65 BPM | SYSTOLIC BLOOD PRESSURE: 139 MMHG

## 2022-05-09 DIAGNOSIS — R42 DIZZINESS: ICD-10-CM

## 2022-05-09 DIAGNOSIS — R55 NEAR SYNCOPE: Primary | ICD-10-CM

## 2022-05-09 LAB
ALBUMIN SERPL BCP-MCNC: 4.2 G/DL (ref 3.5–5.2)
ALP SERPL-CCNC: 86 U/L (ref 55–135)
ALT SERPL W/O P-5'-P-CCNC: 24 U/L (ref 10–44)
ANION GAP SERPL CALC-SCNC: 10 MMOL/L (ref 8–16)
AST SERPL-CCNC: 18 U/L (ref 10–40)
B-HCG UR QL: NEGATIVE
BASOPHILS # BLD AUTO: 0.04 K/UL (ref 0–0.2)
BASOPHILS NFR BLD: 0.5 % (ref 0–1.9)
BILIRUB SERPL-MCNC: 0.5 MG/DL (ref 0.1–1)
BILIRUB UR QL STRIP: NEGATIVE
BUN SERPL-MCNC: 12 MG/DL (ref 6–20)
CALCIUM SERPL-MCNC: 9.5 MG/DL (ref 8.7–10.5)
CHLORIDE SERPL-SCNC: 106 MMOL/L (ref 95–110)
CLARITY UR: CLEAR
CO2 SERPL-SCNC: 23 MMOL/L (ref 23–29)
COLOR UR: YELLOW
CREAT SERPL-MCNC: 0.8 MG/DL (ref 0.5–1.4)
CTP QC/QA: YES
DIFFERENTIAL METHOD: ABNORMAL
EOSINOPHIL # BLD AUTO: 0.1 K/UL (ref 0–0.5)
EOSINOPHIL NFR BLD: 1.5 % (ref 0–8)
ERYTHROCYTE [DISTWIDTH] IN BLOOD BY AUTOMATED COUNT: 12.6 % (ref 11.5–14.5)
EST. GFR  (AFRICAN AMERICAN): >60 ML/MIN/1.73 M^2
EST. GFR  (NON AFRICAN AMERICAN): >60 ML/MIN/1.73 M^2
GLUCOSE SERPL-MCNC: 87 MG/DL (ref 70–110)
GLUCOSE UR QL STRIP: NEGATIVE
HCT VFR BLD AUTO: 42.8 % (ref 37–48.5)
HGB BLD-MCNC: 14.2 G/DL (ref 12–16)
HGB UR QL STRIP: NEGATIVE
IMM GRANULOCYTES # BLD AUTO: 0.01 K/UL (ref 0–0.04)
IMM GRANULOCYTES NFR BLD AUTO: 0.1 % (ref 0–0.5)
KETONES UR QL STRIP: NEGATIVE
LEUKOCYTE ESTERASE UR QL STRIP: NEGATIVE
LYMPHOCYTES # BLD AUTO: 2.7 K/UL (ref 1–4.8)
LYMPHOCYTES NFR BLD: 35 % (ref 18–48)
MAGNESIUM SERPL-MCNC: 1.9 MG/DL (ref 1.6–2.6)
MCH RBC QN AUTO: 29.5 PG (ref 27–31)
MCHC RBC AUTO-ENTMCNC: 33.2 G/DL (ref 32–36)
MCV RBC AUTO: 89 FL (ref 82–98)
MONOCYTES # BLD AUTO: 0.6 K/UL (ref 0.3–1)
MONOCYTES NFR BLD: 8.1 % (ref 4–15)
NEUTROPHILS # BLD AUTO: 4.3 K/UL (ref 1.8–7.7)
NEUTROPHILS NFR BLD: 54.8 % (ref 38–73)
NITRITE UR QL STRIP: NEGATIVE
NRBC BLD-RTO: 0 /100 WBC
PH UR STRIP: 6 [PH] (ref 5–8)
PLATELET # BLD AUTO: 273 K/UL (ref 150–450)
PMV BLD AUTO: 9.1 FL (ref 9.2–12.9)
POCT GLUCOSE: 72 MG/DL (ref 70–110)
POTASSIUM SERPL-SCNC: 3.8 MMOL/L (ref 3.5–5.1)
PROT SERPL-MCNC: 8 G/DL (ref 6–8.4)
PROT UR QL STRIP: NEGATIVE
RBC # BLD AUTO: 4.82 M/UL (ref 4–5.4)
SODIUM SERPL-SCNC: 139 MMOL/L (ref 136–145)
SP GR UR STRIP: 1.02 (ref 1–1.03)
URN SPEC COLLECT METH UR: NORMAL
UROBILINOGEN UR STRIP-ACNC: NEGATIVE EU/DL
WBC # BLD AUTO: 7.77 K/UL (ref 3.9–12.7)

## 2022-05-09 PROCEDURE — 96360 HYDRATION IV INFUSION INIT: CPT

## 2022-05-09 PROCEDURE — 93010 EKG 12-LEAD: ICD-10-PCS | Mod: ,,, | Performed by: INTERNAL MEDICINE

## 2022-05-09 PROCEDURE — 25000003 PHARM REV CODE 250: Performed by: EMERGENCY MEDICINE

## 2022-05-09 PROCEDURE — 81003 URINALYSIS AUTO W/O SCOPE: CPT | Performed by: EMERGENCY MEDICINE

## 2022-05-09 PROCEDURE — 80053 COMPREHEN METABOLIC PANEL: CPT | Performed by: EMERGENCY MEDICINE

## 2022-05-09 PROCEDURE — 93005 ELECTROCARDIOGRAM TRACING: CPT

## 2022-05-09 PROCEDURE — 99284 EMERGENCY DEPT VISIT MOD MDM: CPT | Mod: 25

## 2022-05-09 PROCEDURE — 81025 URINE PREGNANCY TEST: CPT | Performed by: EMERGENCY MEDICINE

## 2022-05-09 PROCEDURE — 82962 GLUCOSE BLOOD TEST: CPT

## 2022-05-09 PROCEDURE — 93010 ELECTROCARDIOGRAM REPORT: CPT | Mod: ,,, | Performed by: INTERNAL MEDICINE

## 2022-05-09 PROCEDURE — 85025 COMPLETE CBC W/AUTO DIFF WBC: CPT | Performed by: EMERGENCY MEDICINE

## 2022-05-09 PROCEDURE — 83735 ASSAY OF MAGNESIUM: CPT | Performed by: EMERGENCY MEDICINE

## 2022-05-09 RX ADMIN — SODIUM CHLORIDE 1000 ML: 0.9 INJECTION, SOLUTION INTRAVENOUS at 07:05

## 2022-05-09 RX ADMIN — SODIUM CHLORIDE 1000 ML: 0.9 INJECTION, SOLUTION INTRAVENOUS at 06:05

## 2022-05-09 NOTE — FIRST PROVIDER EVALUATION
Emergency Department TeleTriage Encounter Note      CHIEF COMPLAINT    Chief Complaint   Patient presents with    Eye Problem    Dizziness     Onset this AM, reports feeling lightheaded and blurred vision. Pt also reports lower back pain, and lower abominal pain. Pt denies dysuria, nausea or vomiting.        VITAL SIGNS   Initial Vitals   BP Pulse Resp Temp SpO2   -- -- -- -- --      MAP       --            ALLERGIES    Review of patient's allergies indicates:  No Known Allergies    PROVIDER TRIAGE NOTE  This is a teletriage evaluation of a 31 y.o. female presenting to the ED with c/o feeling lightheaded/blurry vision while standing at work. Little PO intake today. Blurry vision resolved and lightheadedness improved by time of arrival. Abdominal pain while en route to ED since resolved.     PE: sleeping, awakens to voice. Non-toxic/well-appearing. No respiratory distress, speaks in full sentences without issue. No active emesis nor cough. Normal eye contact and mentation.     Plan: EKG, orthostatics, glucose. Further/augmented workup at discretion of examining provider.     All ED beds are full at present; patient notified of this status.  Patient seen and medically screened by STEPHANIA via teletriage. Orders initiated at triage to expedite care.  Patient is stable and will be placed in an ED bed when available.  Care will be transferred to an alternate provider when patient has been placed in an Exam Room further exam, additional orders, and disposition.         ORDERS  Labs Reviewed   POCT GLUCOSE MONITORING CONTINUOUS       ED Orders (720h ago, onward)    Start Ordered     Status Ordering Provider    05/09/22 1609 05/09/22 1609  EKG 12-lead  Once         Ordered SHELLIE ATKINSON    05/09/22 1609 05/09/22 1609  Orthostatic blood pressure  Once         Ordered SHELLIE ATKINSON    05/09/22 1609 05/09/22 1609  Nursing communication  Once        Comments: Jug of water    Ordered SHELLIE ATKINSON    05/09/22 1608  05/09/22 1609  POCT glucose  Once         Ordered SHELLIE ATKINSON            Virtual Visit Note: The provider triage portion of this emergency department evaluation and documentation was performed via [employer], a HIPAA-compliant telemedicine application, in concert with a tele-presenter in the room. A face to face patient evaluation with one of my colleagues will occur once the patient is placed in an emergency department room.      DISCLAIMER: This note was prepared with [employer] voice recognition transcription software. Garbled syntax, mangled pronouns, and other bizarre constructions may be attributed to that software system.

## 2022-05-09 NOTE — ED NOTES
ORTHOSTATIC BP  (2 Minute Intervals)                                                                        BP                       HR                       O2 STATS    Lying                        119/79                    66                             100  Sitting                      132/87                    67                             100  Standing                  123/91                    67                             100    Pt has no complaints during challenge.

## 2022-05-09 NOTE — ED TRIAGE NOTES
Pt presents to ED c/o dizziness and blurry vision. States that when she was at work earlier she began feeling dizzy and felt as if she was about to pass out. Pt states that vision began to get blurry during this episode. Pt states that symptoms have since subsided and reports no symptoms at this time. Denies chest pain, SOB, N/V/D, fever at home. Denies HTN, or any other medical history. Pot AAO x4, answering questions appropriately at this time.

## 2022-05-09 NOTE — ED PROVIDER NOTES
Encounter Date: 2022    SCRIBE #1 NOTE: I, Jesi , am scribing for, and in the presence of, Rosmery Leos MD.       History     Chief Complaint   Patient presents with    Eye Problem    Dizziness     Onset this AM, reports feeling lightheaded and blurred vision. Pt also reports lower back pain, and lower abominal pain. Pt denies dysuria, nausea or vomiting.      Time seen by provider: 6:33 PM    This is a 31 y.o. female who presents with complaint of light-headedness that she felt earlier today. 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 She began to feel as though she was going to pass out and had slightly blurry vision. She has been feeling progressively better since arriving in the ER. She denies chest pain, shortness of breath, dysuria, or leg swelling. This is the extent of the patient's complaints at this time.        The history is provided by the patient.     Review of patient's allergies indicates:  No Known Allergies  Past Medical History:   Diagnosis Date    Allergic      Past Surgical History:   Procedure Laterality Date     SECTION N/A 2018    Procedure:  SECTION;  Surgeon: Jesi Iqbal MD;  [facility];  Service: OB/GYN;  Laterality: N/A;    NO PAST SURGERIES       Family History   Problem Relation Age of Onset    Breast cancer Neg Hx     Colon cancer Neg Hx     Ovarian cancer Neg Hx      Social History     Tobacco Use    Smoking status: Never Smoker    Smokeless tobacco: Never Used   Substance Use Topics    Alcohol use: Yes     Comment: rarely    Drug use: No     Review of Systems   Constitutional: Negative for fever.   HENT: Negative for congestion and sore throat.    Eyes: Positive for visual disturbance.   Respiratory: Negative for shortness of breath.    Cardiovascular: Negative for chest pain.   Gastrointestinal: Positive for nausea. Negative for  abdominal pain, diarrhea and vomiting.   Genitourinary: Negative for dysuria and flank pain.   Musculoskeletal: Negative for back pain and neck pain.   Skin: Negative for color change and rash.   Neurological: Positive for light-headedness. Negative for dizziness and weakness.   Hematological: Does not bruise/bleed easily.       Physical Exam     Initial Vitals [05/09/22 1838]   BP Pulse Resp Temp SpO2   119/79 67 -- -- 100 %      MAP       --         Physical Exam    Nursing note and vitals reviewed.  Constitutional: She appears well-developed and well-nourished. She is not diaphoretic. No distress.   HENT:   Head: Normocephalic and atraumatic.   Eyes: Conjunctivae and EOM are normal.   Neck: Neck supple.   Normal range of motion.  Cardiovascular: Normal rate, regular rhythm and normal heart sounds.   Pulmonary/Chest: Breath sounds normal. No respiratory distress. She has no wheezes. She has no rhonchi. She has no rales.   Abdominal: Abdomen is soft. Bowel sounds are normal. She exhibits no distension. There is no abdominal tenderness. There is no rebound.   Musculoskeletal:         General: No tenderness or edema. Normal range of motion.      Cervical back: Normal range of motion and neck supple.     Neurological: She is alert and oriented to person, place, and time.   Ambulatory with a steady gait.   Skin: Skin is warm and dry. Capillary refill takes less than 2 seconds.         ED Course   Procedures  Labs Reviewed   CBC W/ AUTO DIFFERENTIAL - Abnormal; Notable for the following components:       Result Value    MPV 9.1 (*)     All other components within normal limits   URINALYSIS   COMPREHENSIVE METABOLIC PANEL   MAGNESIUM   POCT URINE PREGNANCY   POCT GLUCOSE          Imaging Results    None          Medications   sodium chloride 0.9% bolus 1,000 mL (0 mLs Intravenous Stopped 5/9/22 2018)   sodium chloride 0.9% bolus 1,000 mL (0 mLs Intravenous Stopped 5/9/22 1948)     Medical Decision Making:   History:    Old Medical Records: I decided to obtain old medical records.  Old Records Summarized: other records and records from another hospital.  Initial Assessment:   6:33PM:  Patient is a 31-year-old female who presents to the emergency department with feeling lightheaded, feeling like she is about to pass out.  Patient appears well, nontoxic.  She does admit to not drinking or eating all day and was in a hot work environment.  Will plan for labs, IV fluids, will continue to follow and reassess.  Independently Interpreted Test(s):   I have ordered and independently interpreted EKG Reading(s) - see prior notes  Clinical Tests:   Lab Tests: Ordered and Reviewed  Medical Tests: Ordered and Reviewed    8:41 PM:  Patient doing well, remains stable.  She is feeling much better after the IV fluids.  Labs are otherwise unremarkable with no acute findings.  Given her reassuring workup, I do not feel that further work up in the ED is indicated at this time.  I updated pt regarding results and I counseled pt regarding supportive care measures.  I have discussed with the pt ED return warnings and need for close PCP f/u.  Pt agreeable to plan and all questions answered.  I feel that pt is stable for discharge and management as an outpatient and no further intervention is needed at this time.  Pt is comfortable returning to the ED if needed.  Will DC home in stable condition.            Scribe Attestation:   Scribe #1: I performed the above scribed service and the documentation accurately describes the services I performed. I attest to the accuracy of the note.              Physician Attestation for Scribe: I, Rosmery Leos, reviewed documentation as scribed in my presence, which is both accurate and complete.      Clinical Impression:   Final diagnoses:  [R42] Dizziness  [R55] Near syncope (Primary)          ED Disposition Condition    Discharge Stable        ED Prescriptions     None        Follow-up Information     Follow up With  Specialties Details Why Contact Info    Primary Care Physican               Rosmery Leos MD  05/09/22 3177

## 2022-12-20 ENCOUNTER — HOSPITAL ENCOUNTER (EMERGENCY)
Facility: OTHER | Age: 32
Discharge: HOME | End: 2022-12-21
Attending: EMERGENCY MEDICINE
Payer: MEDICAID

## 2022-12-20 DIAGNOSIS — R10.30 LOWER ABDOMINAL PAIN: Primary | ICD-10-CM

## 2022-12-20 DIAGNOSIS — R00.2 PALPITATIONS: ICD-10-CM

## 2022-12-20 DIAGNOSIS — M54.50 ACUTE BILATERAL LOW BACK PAIN WITHOUT SCIATICA: ICD-10-CM

## 2022-12-20 PROCEDURE — 81025 URINE PREGNANCY TEST: CPT | Performed by: EMERGENCY MEDICINE

## 2022-12-20 PROCEDURE — 93010 ELECTROCARDIOGRAM REPORT: CPT | Mod: ,,, | Performed by: INTERNAL MEDICINE

## 2022-12-20 PROCEDURE — 99284 EMERGENCY DEPT VISIT MOD MDM: CPT

## 2022-12-20 PROCEDURE — 93010 EKG 12-LEAD: ICD-10-PCS | Mod: ,,, | Performed by: INTERNAL MEDICINE

## 2022-12-20 PROCEDURE — 93005 ELECTROCARDIOGRAM TRACING: CPT

## 2022-12-21 VITALS
BODY MASS INDEX: 26.63 KG/M2 | WEIGHT: 170 LBS | TEMPERATURE: 99 F | SYSTOLIC BLOOD PRESSURE: 104 MMHG | HEART RATE: 80 BPM | DIASTOLIC BLOOD PRESSURE: 77 MMHG | OXYGEN SATURATION: 100 % | RESPIRATION RATE: 16 BRPM

## 2022-12-21 LAB
B-HCG UR QL: NEGATIVE
BILIRUB UR QL STRIP: NEGATIVE
CLARITY UR: CLEAR
COLOR UR: YELLOW
CTP QC/QA: YES
GLUCOSE UR QL STRIP: NEGATIVE
HGB UR QL STRIP: NEGATIVE
KETONES UR QL STRIP: NEGATIVE
LEUKOCYTE ESTERASE UR QL STRIP: NEGATIVE
NITRITE UR QL STRIP: NEGATIVE
PH UR STRIP: 7 [PH] (ref 5–8)
PROT UR QL STRIP: NEGATIVE
SP GR UR STRIP: 1.02 (ref 1–1.03)
URN SPEC COLLECT METH UR: NORMAL
UROBILINOGEN UR STRIP-ACNC: NEGATIVE EU/DL

## 2022-12-21 PROCEDURE — 81003 URINALYSIS AUTO W/O SCOPE: CPT | Performed by: EMERGENCY MEDICINE

## 2022-12-21 RX ORDER — IBUPROFEN 600 MG/1
600 TABLET ORAL EVERY 6 HOURS PRN
Qty: 20 TABLET | Refills: 0 | Status: SHIPPED | OUTPATIENT
Start: 2022-12-21 | End: 2023-01-14 | Stop reason: SDUPTHER

## 2022-12-21 RX ORDER — CYCLOBENZAPRINE HCL 10 MG
10 TABLET ORAL 3 TIMES DAILY PRN
Qty: 15 TABLET | Refills: 0 | Status: SHIPPED | OUTPATIENT
Start: 2022-12-21 | End: 2022-12-26

## 2022-12-21 NOTE — ED TRIAGE NOTES
Pt reports bilateral lower back pain for the last couple of days and mid lower abd pain that started td. Pt denies any dysuria/hematuria, vaginal discharge, chills, fever, N/V/D. Pt VSS, AAOx4, NADN at this time.

## 2022-12-21 NOTE — ED NOTES
Pt resting in bed at this time, TERESITA. Call light next to pt, bed in lowest/locked position, pt free from fall/injury. Pt instructed to ask for assistance, will continue to monitor while in the ER.

## 2022-12-21 NOTE — ED PROVIDER NOTES
Source of History:  The patient    Chief complaint:  Abdominal Pain (Middle/lower abdominal pain X4 hours.  Pt denies urinary symptoms)      HPI:  Ken Bills is a 32 y.o. female presenting with multiple nonspecific complaints.  Initially complained of lower abdominal pain that started about 4 hours prior to arrival.  States it is minimal and cramping.  Denies any nausea vomiting or diarrhea.  No dysuria or discharge.  Also complain of associated low back pain that is achy.  States she has not remembered picking up anything out of the ordinary but did put a [place] tree up couple of days ago.  She also earlier this afternoon had some palpitations and then felt some numbness on her left upper extremity.  Earlier in the day she had some numbness in the right upper extremity.    Currently she is had a difficult time finding work and is very stressed right now about the holidays about not having enough money for her daughter.    This is the extent to the patients complaints today here in the emergency department.    ROS: As per HPI and below:  Constitutional: No fever.  No chills.  Eyes: No visual changes.  ENT: No sore throat. No ear pain    Cardiovascular: No chest pain.  Palpitations  Respiratory: No shortness of breath.  GI:  Positive for low abdominal pain.  No nausea vomiting or diarrhea  Genitourinary: No abnormal urination.  Neurologic: No headache. No focal weakness.  Intermittent bilateral numbness  MSK:  Back pain  Integument: No rashes or lesions.  Hematologic: No easy bruising.  Endocrine: No excessive thirst or urination.    Review of patient's allergies indicates:  No Known Allergies    PMH:  As per HPI and below:  Past Medical History:   Diagnosis Date    Allergic      Past Surgical History:   Procedure Laterality Date     SECTION N/A 2018    Procedure:  SECTION;  Surgeon: Jesi Iqbal MD;  Location: [facility];  Service: OB/GYN;  Laterality: N/A;    NO PAST  SURGERIES         Social History     Tobacco Use    Smoking status: Never    Smokeless tobacco: Never   Substance Use Topics    Alcohol use: Yes     Comment: rarely    Drug use: No       Physical Exam:    /61 (BP Location: Left arm, Patient Position: Sitting)   Pulse 90   Temp 99.1 °F (37.3 °C) (Oral)   Resp 16   Wt 77.1 kg (170 lb)   LMP  (LMP Unknown)   SpO2 99%   Breastfeeding No   BMI 26.63 kg/m²   Nursing note and vital signs reviewed.  Constitutional: No acute distress.  Nontoxic  Eyes: No conjunctival injection.  Extraocular muscles are intact.  ENT: Oropharynx clear.  Normal phonation.  Cardiovascular: Regular rate and rhythm.  No murmurs. No gallops. No rubs  Respiratory: Clear to auscultation bilaterally.  Good air movement.  No wheezes.  No rhonchi. No rales. No accessory muscle use.  Abdomen:  Soft nontender nondistended bowel sounds are normal.  Musculoskeletal: Good range of motion all joints.  No deformities.  Neck supple.  No meningismus.  Skin: No rashes seen.  Good turgor.  No abrasions.  No ecchymoses.  Neuro: alert and oriented x3,  no focal neurological deficits.  Psych: Appropriate, conversant    Labs that have been ordered have been independently reviewed and interpreted by myself.    I decided to obtain the patient's medical records.  Summary of Medical Records:      MDM/ Differential Dx:   32 y.o. female with nonspecific complaints.  Differential would include pregnancy, urinary tract infection, musculoskeletal.  I have considered but do not suspect stroke.  I do feel there is a component of anxiety/stress with the traveling paresthesias.  Currently she is asymptomatic.  Will get an EKG as well as urinalysis and UPT.  Currently I do not feel any blood work is indicated.  I do not suspect stroke or intracranial hemorrhage and do not feel imaging is indicated of the brain.  No blood work indicated at this time based on the history and physical exam.    ED Course as of 12/21/22  0057   Wed Dec 21, 2022   0018 EKG 12-lead  EKG independently interpreted by myself showing normal sinus rhythm at a rate of 64 , normal intervals, normal QRS, no ST, T-wave abnormalities.  Overall impression negative EKG.  Compared to an EKG May 9, 2022 shows no change.   []   0055 Preg Test, Ur: Negative [SM]   0055 Urinalysis, Reflex to Urine Culture Urine, Clean Catch  Negative urine [SM]   0055 Patient remains asymptomatic.  Do not feel any further workup is indicated.  Most likely musculoskeletal.  Reassured the patient.      No further workup is indicated in the emergency department today.  I updated pt regarding results and I counseled pt regarding supportive care measures.  Diagnosis and treatment plan explained to patient. I have answered all questions and the patient is satisfied with the plan of care. Patient discharged home in stable condition.  []      ED Course User Index  [] Daniel Rodriguez DO               Diagnostic Impression:    1. Lower abdominal pain    2. Palpitations    3. Acute bilateral low back pain without sciatica         ED Disposition Condition    Discharge Stable            ED Prescriptions       Medication Sig Dispense Start Date End Date Auth. Provider    ibuprofen (ADVIL,MOTRIN) 600 MG tablet Take 1 tablet (600 mg total) by mouth every 6 (six) hours as needed for Pain. 20 tablet 12/21/2022 -- Daniel Rodriguez DO    cyclobenzaprine (FLEXERIL) 10 MG tablet Take 1 tablet (10 mg total) by mouth 3 (three) times daily as needed for Muscle spasms. 15 tablet 12/21/2022 12/26/2022 Daniel Rodriguez DO          Follow-up Information       Follow up With Specialties Details Why Contact Info    [facility] Gregg Motta  Schedule an appointment as soon as possible for a visit in 2 weeks  [address] [facility] [id number]  [phone]               Daniel Rodriguez DO  12/21/22 0057

## 2022-12-21 NOTE — PLAN OF CARE
SW received this message from ER Coordinator. SW referred this patient to Outpatient Case Management.           MRN [id number]    Good morning, I just spoke this to delightful lady this morning as a follow up from her visit yesterday. In the provider's notes they expressed that the patient has been unable to find a job and is stressed out about having enough money to provide for her daughter. Would it be possible for you to reach out to her with resources to help her during this time? Thank you and have a blessed day.       Trenton KHANN,RN    -    [facility]    [address] [place] [address]    Phone [phone]

## 2023-01-14 ENCOUNTER — HOSPITAL ENCOUNTER (EMERGENCY)
Facility: OTHER | Age: 33
Discharge: HOME | End: 2023-01-14
Attending: EMERGENCY MEDICINE
Payer: MEDICAID

## 2023-01-14 VITALS
DIASTOLIC BLOOD PRESSURE: 72 MMHG | RESPIRATION RATE: 14 BRPM | TEMPERATURE: 99 F | OXYGEN SATURATION: 97 % | SYSTOLIC BLOOD PRESSURE: 117 MMHG | WEIGHT: 200 LBS | HEART RATE: 98 BPM | BODY MASS INDEX: 31.32 KG/M2

## 2023-01-14 DIAGNOSIS — G43.809 OTHER MIGRAINE WITHOUT STATUS MIGRAINOSUS, NOT INTRACTABLE: Primary | ICD-10-CM

## 2023-01-14 LAB
ALBUMIN SERPL BCP-MCNC: 3.6 G/DL (ref 3.5–5.2)
ALP SERPL-CCNC: 84 U/L (ref 55–135)
ALT SERPL W/O P-5'-P-CCNC: 25 U/L (ref 10–44)
ANION GAP SERPL CALC-SCNC: 8 MMOL/L (ref 8–16)
AST SERPL-CCNC: 16 U/L (ref 10–40)
B-HCG UR QL: NEGATIVE
BASOPHILS # BLD AUTO: 0.03 K/UL (ref 0–0.2)
BASOPHILS NFR BLD: 0.3 % (ref 0–1.9)
BILIRUB SERPL-MCNC: 0.2 MG/DL (ref 0.1–1)
BUN SERPL-MCNC: 10 MG/DL (ref 6–20)
CALCIUM SERPL-MCNC: 8.9 MG/DL (ref 8.7–10.5)
CHLORIDE SERPL-SCNC: 108 MMOL/L (ref 95–110)
CO2 SERPL-SCNC: 26 MMOL/L (ref 23–29)
CREAT SERPL-MCNC: 0.8 MG/DL (ref 0.5–1.4)
CTP QC/QA: YES
DIFFERENTIAL METHOD: NORMAL
EOSINOPHIL # BLD AUTO: 0.2 K/UL (ref 0–0.5)
EOSINOPHIL NFR BLD: 1.9 % (ref 0–8)
ERYTHROCYTE [DISTWIDTH] IN BLOOD BY AUTOMATED COUNT: 12.7 % (ref 11.5–14.5)
EST. GFR  (NO RACE VARIABLE): >60 ML/MIN/1.73 M^2
GLUCOSE SERPL-MCNC: 90 MG/DL (ref 70–110)
HCT VFR BLD AUTO: 40.2 % (ref 37–48.5)
HGB BLD-MCNC: 13.7 G/DL (ref 12–16)
IMM GRANULOCYTES # BLD AUTO: 0.02 K/UL (ref 0–0.04)
IMM GRANULOCYTES NFR BLD AUTO: 0.2 % (ref 0–0.5)
LYMPHOCYTES # BLD AUTO: 2.6 K/UL (ref 1–4.8)
LYMPHOCYTES NFR BLD: 28.1 % (ref 18–48)
MCH RBC QN AUTO: 29.9 PG (ref 27–31)
MCHC RBC AUTO-ENTMCNC: 34.1 G/DL (ref 32–36)
MCV RBC AUTO: 88 FL (ref 82–98)
MONOCYTES # BLD AUTO: 0.7 K/UL (ref 0.3–1)
MONOCYTES NFR BLD: 7.6 % (ref 4–15)
NEUTROPHILS # BLD AUTO: 5.6 K/UL (ref 1.8–7.7)
NEUTROPHILS NFR BLD: 61.9 % (ref 38–73)
NRBC BLD-RTO: 0 /100 WBC
PLATELET # BLD AUTO: 285 K/UL (ref 150–450)
PMV BLD AUTO: 9.2 FL (ref 9.2–12.9)
POTASSIUM SERPL-SCNC: 3.5 MMOL/L (ref 3.5–5.1)
PROT SERPL-MCNC: 7.4 G/DL (ref 6–8.4)
RBC # BLD AUTO: 4.58 M/UL (ref 4–5.4)
SODIUM SERPL-SCNC: 142 MMOL/L (ref 136–145)
WBC # BLD AUTO: 9.06 K/UL (ref 3.9–12.7)

## 2023-01-14 PROCEDURE — 96374 THER/PROPH/DIAG INJ IV PUSH: CPT

## 2023-01-14 PROCEDURE — 96375 TX/PRO/DX INJ NEW DRUG ADDON: CPT

## 2023-01-14 PROCEDURE — 85025 COMPLETE CBC W/AUTO DIFF WBC: CPT | Performed by: EMERGENCY MEDICINE

## 2023-01-14 PROCEDURE — 25000003 PHARM REV CODE 250: Performed by: EMERGENCY MEDICINE

## 2023-01-14 PROCEDURE — 96361 HYDRATE IV INFUSION ADD-ON: CPT

## 2023-01-14 PROCEDURE — 99284 EMERGENCY DEPT VISIT MOD MDM: CPT | Mod: 25

## 2023-01-14 PROCEDURE — 81025 URINE PREGNANCY TEST: CPT | Performed by: EMERGENCY MEDICINE

## 2023-01-14 PROCEDURE — 63600175 PHARM REV CODE 636 W HCPCS: Performed by: EMERGENCY MEDICINE

## 2023-01-14 PROCEDURE — 80053 COMPREHEN METABOLIC PANEL: CPT | Performed by: EMERGENCY MEDICINE

## 2023-01-14 RX ORDER — KETOROLAC TROMETHAMINE 30 MG/ML
15 INJECTION, SOLUTION INTRAMUSCULAR; INTRAVENOUS
Status: COMPLETED | OUTPATIENT
Start: 2023-01-14 | End: 2023-01-14

## 2023-01-14 RX ORDER — IBUPROFEN 600 MG/1
600 TABLET ORAL EVERY 6 HOURS PRN
Qty: 20 TABLET | Refills: 0 | OUTPATIENT
Start: 2023-01-14 | End: 2023-01-17

## 2023-01-14 RX ORDER — PROCHLORPERAZINE EDISYLATE 5 MG/ML
5 INJECTION INTRAMUSCULAR; INTRAVENOUS
Status: COMPLETED | OUTPATIENT
Start: 2023-01-14 | End: 2023-01-14

## 2023-01-14 RX ORDER — BUTALBITAL, ACETAMINOPHEN AND CAFFEINE 50; 325; 40 MG/1; MG/1; MG/1
1 TABLET ORAL EVERY 6 HOURS PRN
Qty: 20 TABLET | Refills: 0 | Status: SHIPPED | OUTPATIENT
Start: 2023-01-14 | End: 2023-02-13

## 2023-01-14 RX ORDER — ONDANSETRON 4 MG/1
4 TABLET, ORALLY DISINTEGRATING ORAL EVERY 8 HOURS PRN
Qty: 15 TABLET | Refills: 0 | Status: SHIPPED | OUTPATIENT
Start: 2023-01-14

## 2023-01-14 RX ADMIN — KETOROLAC TROMETHAMINE 15 MG: 30 INJECTION, SOLUTION INTRAMUSCULAR; INTRAVENOUS at 02:01

## 2023-01-14 RX ADMIN — SODIUM CHLORIDE 1000 ML: 0.9 INJECTION, SOLUTION INTRAVENOUS at 02:01

## 2023-01-14 RX ADMIN — PROCHLORPERAZINE EDISYLATE 5 MG: 5 INJECTION INTRAMUSCULAR; INTRAVENOUS at 02:01

## 2023-01-14 NOTE — ED PROVIDER NOTES
"Encounter Date: 2023    SCRIBE #1 NOTE: I, Radha Singh, am scribing for, and in the presence of,  Heber Buck MD. I have scribed the following portions of the note - Other sections scribed: HPI, ROS, PE.     History     Chief Complaint   Patient presents with    Headache     For weeks. Pt states, "I want to go in the machine." Pt seen before for problem, Pt lwbs.     Ken Bills is a 32 y.o. female, with no pertinent PMHx, who presents to the ED for evaluation of multiple complaints. Pt was recently evaluated here in the ED on 22 for left arm numbness which she states has been intermittent since. She reports associated 10/10 intensity headache, right sided neck pain which she describes as a "tightness" and right eye blurry vision during these episodes. States she feels near syncope during these episodes. She has taken muscle relaxants and Ibuprofen, most recently today at 0000 with minimal improvement. No modifying factors. Denies difficulty with P/O intake and has been able to self hydrate.  Pt also reports ongoing lower back pain for the last 2-3 weeks. Denies recent traumas, injuries, falls, inciting factors, or immediate preceding events. Denies chest pain, pain elsewhere, and other somatic complaints. This is the extent of the patient's complaints at this time.    The history is provided by the patient.   Review of patient's allergies indicates:  No Known Allergies  Past Medical History:   Diagnosis Date    Allergic      Past Surgical History:   Procedure Laterality Date     SECTION N/A 2018    Procedure:  SECTION;  Surgeon: Jesi Iqbal MD;  Location: [facility] L&D;  Service: OB/GYN;  Laterality: N/A;    NO PAST SURGERIES       Family History   Problem Relation Age of Onset    Breast cancer Neg Hx     Colon cancer Neg Hx     Ovarian cancer Neg Hx      Social History     Tobacco Use    Smoking status: Never    Smokeless tobacco: Never   Substance Use Topics "    Alcohol use: Yes     Comment: rarely    Drug use: No     Review of Systems   Constitutional:  Negative for fever.   HENT:  Negative for congestion.    Eyes:  Positive for visual disturbance. Negative for redness.   Respiratory:  Negative for shortness of breath.    Cardiovascular:  Negative for chest pain.   Gastrointestinal:  Negative for abdominal pain.   Genitourinary:  Negative for dysuria.   Musculoskeletal:  Positive for back pain and neck pain.   Skin:  Negative for rash.   Neurological:  Positive for numbness and headaches.   Psychiatric/Behavioral:  Negative for confusion.      Physical Exam     Initial Vitals [01/14/23 0123]   BP Pulse Resp Temp SpO2   128/79 85 18 98.2 °F (36.8 °C) 98 %      MAP       --         Physical Exam    Nursing note and vitals reviewed.  Constitutional: She appears well-developed and well-nourished. She is not diaphoretic. No distress.   HENT:   Head: Normocephalic and atraumatic.   Eyes: Conjunctivae are normal. No scleral icterus.   Neck: Neck supple.   Normal range of motion.  Cardiovascular:  Normal rate, regular rhythm, normal heart sounds and intact distal pulses.           No murmur heard.  Pulmonary/Chest: Breath sounds normal. No respiratory distress. She has no wheezes. She has no rhonchi. She has no rales.   Abdominal: Abdomen is soft. There is no abdominal tenderness. There is no rebound.   Musculoskeletal:         General: No edema.      Cervical back: Normal range of motion and neck supple. Muscular tenderness present.      Comments: Right cervical muscle tenderness.     Neurological: She is alert and oriented to person, place, and time. She has normal strength. No cranial nerve deficit or sensory deficit.   Skin: Skin is warm and dry.   Psychiatric: She has a normal mood and affect.       ED Course   Procedures  Labs Reviewed   CBC W/ AUTO DIFFERENTIAL   COMPREHENSIVE METABOLIC PANEL   POCT URINE PREGNANCY          Imaging Results    None          Medications    sodium chloride 0.9% bolus 1,000 mL 1,000 mL (0 mLs Intravenous Stopped 1/14/23 0337)   ketorolac injection 15 mg (15 mg Intravenous Given 1/14/23 0242)   prochlorperazine injection Soln 5 mg (5 mg Intravenous Given 1/14/23 0242)     Medical Decision Making:   History:   Old Medical Records: I decided to obtain old medical records.  Initial Assessment:       32-year-old female with no known past medical history brought by EMS for evaluation of intermittent headache and left-sided numbness.  She states initial onset was about a month ago, and she was seen here on 12/20 for it.  Per chart review, she was seen for lower abdominal pain and left arm numbness was also mentioned but she had normal neuro exam, symptoms attributed to possible anxiety.  She states since then she has had intermittent episodes of left arm numbness, and a retro-orbital throbbing headache as well, with occasional right eye blurry vision.  She was brought by EMS on 01/05 but not seen then due to long wait times.  She also reports intermittent right-sided neck stiffness and low back pain with no known preceding injury.  No chest pain, fevers, difficulty breathing, or other associated symptoms.  On exam patient with normal visual acuity, no meningeal signs, no current left arm numbness or neuro deficits on exam to suggest CVA.  No known risk factors for TIA.  By description, most likely complex migraine since she states headache and left arm numbness usually comes together along with right eye vision changes.  She does have some mild right paraspinal cervical muscle tenderness, no midline tenderness or trauma to suggest fracture or spinal cord compression, no radiation of neck pain to left arm to suggest cervical radiculopathy.  No fevers or exam meningeal signs to suggest meningitis.  Most likely neck/back muscle strain and spasm, she has previously prescribed ibuprofen and Flexeril for this after ED visit on 12/20 but only took 1 dose of  Flexeril due to sedative side effect.  Will treat with migraine cocktail and check basic labs, consider CT head if no improvement.      Initial labs with no acute findings, including normal WBC, LFTs and renal function.  Patient treated with IV Toradol and Compazine and on reassessment she is asymptomatic, with no headache or visual changes, no episodes of left arm numbness while in ED. no sign of CVA or indication for emergent head CT or imaging at this time.  She most likely has complex migraine and is advised on ibuprofen p.r.n. for any further headaches, will also Rx Fioricet for second-line treatment.  She is also referred to our back and spine clinic for further management of her neck and back pain that appears to be muscular, no radiculopathy or extremity neuro deficits to suggest spinal cord compression.  Patient is comfortable with this discharge plan and understands need for PCP follow-up for reassessment and to return to the ED for any worsening symptoms.        Clinical Tests:   Lab Tests: Ordered and Reviewed        Scribe Attestation:   Scribe #1: I performed the above scribed service and the documentation accurately describes the services I performed. I attest to the accuracy of the note.    I, Dr. Heber Buck, personally performed the services described in this documentation. All medical record entries made by the scribe were at my direction and in my presence.  I have reviewed the chart and agree that the record reflects my personal performance and is accurate and complete. Heber Buck MD.                      Clinical Impression:   Final diagnoses:  [G43.809] Other migraine without status migrainosus, not intractable (Primary)        ED Disposition Condition    Discharge Stable          ED Prescriptions       Medication Sig Dispense Start Date End Date Auth. Provider    ibuprofen (ADVIL,MOTRIN) 600 MG tablet Take 1 tablet (600 mg total) by mouth every 6 (six) hours as needed for Pain.  20 tablet 1/14/2023 -- Heber Buck MD    butalbital-acetaminophen-caffeine -40 mg (FIORICET, ESGIC) -40 mg per tablet Take 1 tablet by mouth every 6 (six) hours as needed for Headaches. 20 tablet 1/14/2023 2/13/2023 Heber Buck MD    ondansetron (ZOFRAN-ODT) 4 MG TbDL Take 1 tablet (4 mg total) by mouth every 8 (eight) hours as needed. 15 tablet 1/14/2023 -- Heber Buck MD          Follow-up Information       Follow up With Specialties Details Why Contact Info Additional Information    [facility] - Emergency Dept Emergency Medicine Go to  If symptoms worsen [address] [facility] [address] [phone]     [facility] Spine Services Schedule an appointment as soon as possible for a visit in 1 week  [address], [address]  [facility] [address] [phone] Back & Spine Center - [facility], 4th Floor Please park in [place] and use [place] elevators             Heber Buck MD  01/14/23 8024

## 2023-01-17 ENCOUNTER — HOSPITAL ENCOUNTER (EMERGENCY)
Facility: OTHER | Age: 33
Discharge: HOME | End: 2023-01-17
Attending: EMERGENCY MEDICINE
Payer: MEDICAID

## 2023-01-17 VITALS
DIASTOLIC BLOOD PRESSURE: 90 MMHG | TEMPERATURE: 98 F | HEART RATE: 75 BPM | SYSTOLIC BLOOD PRESSURE: 123 MMHG | HEIGHT: 67 IN | RESPIRATION RATE: 15 BRPM | BODY MASS INDEX: 27.47 KG/M2 | OXYGEN SATURATION: 100 % | WEIGHT: 175 LBS

## 2023-01-17 DIAGNOSIS — M54.50 CHRONIC BILATERAL LOW BACK PAIN WITHOUT SCIATICA: Primary | ICD-10-CM

## 2023-01-17 DIAGNOSIS — G89.29 CHRONIC BILATERAL LOW BACK PAIN WITHOUT SCIATICA: Primary | ICD-10-CM

## 2023-01-17 LAB
B-HCG UR QL: NEGATIVE
BILIRUB UR QL STRIP: NEGATIVE
CLARITY UR: CLEAR
COLOR UR: YELLOW
CTP QC/QA: YES
GLUCOSE UR QL STRIP: NEGATIVE
HGB UR QL STRIP: NEGATIVE
KETONES UR QL STRIP: NEGATIVE
LEUKOCYTE ESTERASE UR QL STRIP: NEGATIVE
NITRITE UR QL STRIP: NEGATIVE
PH UR STRIP: 6 [PH] (ref 5–8)
PROT UR QL STRIP: NEGATIVE
SP GR UR STRIP: 1.02 (ref 1–1.03)
URN SPEC COLLECT METH UR: NORMAL
UROBILINOGEN UR STRIP-ACNC: NEGATIVE EU/DL

## 2023-01-17 PROCEDURE — 99284 EMERGENCY DEPT VISIT MOD MDM: CPT | Mod: 25

## 2023-01-17 PROCEDURE — 81025 URINE PREGNANCY TEST: CPT | Performed by: PHYSICIAN ASSISTANT

## 2023-01-17 PROCEDURE — 96372 THER/PROPH/DIAG INJ SC/IM: CPT | Performed by: NURSE PRACTITIONER

## 2023-01-17 PROCEDURE — 63600175 PHARM REV CODE 636 W HCPCS: Performed by: NURSE PRACTITIONER

## 2023-01-17 PROCEDURE — 81003 URINALYSIS AUTO W/O SCOPE: CPT | Performed by: PHYSICIAN ASSISTANT

## 2023-01-17 PROCEDURE — 25000003 PHARM REV CODE 250: Performed by: NURSE PRACTITIONER

## 2023-01-17 RX ORDER — KETOROLAC TROMETHAMINE 30 MG/ML
15 INJECTION, SOLUTION INTRAMUSCULAR; INTRAVENOUS
Status: COMPLETED | OUTPATIENT
Start: 2023-01-17 | End: 2023-01-17

## 2023-01-17 RX ORDER — LIDOCAINE 50 MG/G
2 PATCH TOPICAL ONCE
Status: DISCONTINUED | OUTPATIENT
Start: 2023-01-17 | End: 2023-01-17 | Stop reason: HOSPADM

## 2023-01-17 RX ORDER — IBUPROFEN 600 MG/1
600 TABLET ORAL EVERY 6 HOURS PRN
Qty: 20 TABLET | Refills: 0 | Status: SHIPPED | OUTPATIENT
Start: 2023-01-17

## 2023-01-17 RX ORDER — METHOCARBAMOL 750 MG/1
1500 TABLET, FILM COATED ORAL 3 TIMES DAILY
Qty: 30 TABLET | Refills: 0 | Status: SHIPPED | OUTPATIENT
Start: 2023-01-17 | End: 2023-01-22

## 2023-01-17 RX ORDER — LIDOCAINE 50 MG/G
1 PATCH TOPICAL DAILY
Qty: 15 PATCH | Refills: 0 | Status: SHIPPED | OUTPATIENT
Start: 2023-01-17

## 2023-01-17 RX ORDER — METHOCARBAMOL 750 MG/1
1500 TABLET, FILM COATED ORAL
Status: COMPLETED | OUTPATIENT
Start: 2023-01-17 | End: 2023-01-17

## 2023-01-17 RX ADMIN — METHOCARBAMOL 1500 MG: 750 TABLET ORAL at 10:01

## 2023-01-17 RX ADMIN — LIDOCAINE 2 PATCH: 50 PATCH CUTANEOUS at 10:01

## 2023-01-17 RX ADMIN — KETOROLAC TROMETHAMINE 15 MG: 30 INJECTION, SOLUTION INTRAMUSCULAR; INTRAVENOUS at 10:01

## 2023-01-17 NOTE — ED PROVIDER NOTES
"     Source of History:  Patient    Chief complaint:  Back Pain (Patient presented to ER with c/o lower back pain x 2 weeks of unknown cause. Pt stated she was treated in ER 2 weeks ago for the same problem. )      HPI:  Ken Bills is a 32 y.o. female presenting with low back pain for the past 2 weeks.  Patient states that she is been seen for this complaint before.  Chart review shows that she was instructed to follow-up with back and spine and she was prescribed NSAIDs and muscle relaxers.  She states that she can not take the muscle relaxer Flexeril as it makes her too sedated.  She is not followed up with back and spine.  No new injury or trauma.  She is insisting on an x-ray of her back considering that her back pain has not improved.  No fever, chills, dysuria or flank pain.    This is the extent to the patients complaints today here in the emergency department.    ROS: As per HPI and below:  General: No fever.  No chills.  Eyes: No visual changes.   ENT: No sore throat. No ear pain.  Urinary: No abnormal urination.  MSK: +back pain  Integument: No rashes or lesions.    Review of patient's allergies indicates:  No Known Allergies    PMH:  As per HPI and below:  Past Medical History:   Diagnosis Date    Allergic      Past Surgical History:   Procedure Laterality Date     SECTION N/A 2018    Procedure:  SECTION;  Surgeon: Jesi Iqbal MD;  Location: [facility] L&D;  Service: OB/GYN;  Laterality: N/A;    NO PAST SURGERIES         Social History     Tobacco Use    Smoking status: Never    Smokeless tobacco: Never   Substance Use Topics    Alcohol use: Yes     Comment: rarely    Drug use: No       Physical Exam:    BP (!) 123/90 (BP Location: Right arm, Patient Position: Sitting)   Pulse 75   Temp 98.2 °F (36.8 °C) (Oral)   Resp 15   Ht 5' 7" (1.702 m)   Wt 79.4 kg (175 lb)   LMP  (LMP Unknown)   SpO2 100%   BMI 27.41 kg/m²   Nursing note and vital signs " reviewed.  Appearance: No acute distress.  Eyes: No conjunctival injection.  ENT: Normal phonation.  Musculoskeletal:  No C/T/L midline tenderness.  Bilateral lumbar paraspinal tenderness, mild.  Patient ambulates with a normal gait.  Full flexion extension and articulation of the back  Skin: No rashes seen.  Good turgor.  No abrasions.  No ecchymoses.  Mental Status:  Alert and oriented x 3.  Appropriate, conversant.        Initial Impression/ Differential Dx:  Urgent evaluation of 32-year-old female presenting with nontraumatic back pain.  Patient is afebrile, not toxic appearing and hemodynamically stable.  Patient appears mildly uncomfortable and is slow to change positions however patient ambulates normally and FROM, full flexion and extension of all extremities and back. Patient's pain is localized and reproducible with palpation of  lumbar paraspinal muscles. No midline tenderness, step-offs or deformities of the cervical, thoracic or lumbar spine.  Patient neurovascularly intact. The patient's back pain is likely a musculoskeletal strain.  There are no signs of saddle anesthesia, incontinence, neurologic deficits, fevers, trauma or midline tenderness on history or physical to suggest cauda equina, infectious process, fracture or subluxation.  I do not think patient needs imaging at this time, however she is insistent.  Will obtain plain film of lumbar spine.  UA and UPT ordered from tele triage.  I will treat with Lidoderm, anti-inflammatories and muscle relaxers for relief.    Differential Diagnosis includes, but is not limited to:  Cauda equina syndrome, diskitis/osteomyelitis, epidural/paraspinal abscess, AAA, aortic dissection, post-op/hardware infection, trauma/vertebral fracture, spinal cord injury, disc herniation, spinal stenosis, sciatica, radiculopathy, neoplasm, lumbar muscle strain, muscle spasm, neuropathic pain, UTI/pyelonephritis, nephrolithiasis.        MDM:        ED Course as of 01/17/23  1829   Tue Jan 17, 2023   1126 Preg Test, Ur: Negative [CU]   1126 Urinalysis, Reflex to Urine Culture Urine, Clean Catch  No evidence of infection or dehydration [CU]   1200 X-Ray Lumbar Spine Ap And Lateral  No acute osseous abnormalities [CU]   1213 At bedside to reassess patient and inform her of her normal x-ray.  She states that she has had some improvement in her pain.  Counseled patient on supportive care and will discharge home with supportive medications.  Will also place a referral for physical therapy and have her follow-up with her PCP as needed.  Patient amenable to this plan and discharged in good condition. Patient educated on signs and symptoms to monitor for and when to return to ED. Patient verbalized understanding agrees with treatment plan. All questions and concerns addressed.      [CU]      ED Course User Index  [CU] Tommy Green NP               Diagnostic Impression:    1. Chronic bilateral low back pain without sciatica         ED Disposition Condition    Discharge Good            ED Prescriptions       Medication Sig Dispense Start Date End Date Auth. Provider    ibuprofen (ADVIL,MOTRIN) 600 MG tablet Take 1 tablet (600 mg total) by mouth every 6 (six) hours as needed for Pain. 20 tablet 1/17/2023 -- Tommy Green NP    methocarbamoL (ROBAXIN) 750 MG Tab Take 2 tablets (1,500 mg total) by mouth 3 (three) times daily. for 5 days 30 tablet 1/17/2023 1/22/2023 Tommy Green NP    LIDOcaine (LIDODERM) 5 % Place 1 patch onto the skin once daily. Remove & Discard patch within 12 hours or as directed by MD 15 patch 1/17/2023 -- Tommy Green NP          Follow-up Information       Follow up With Specialties Details Why Contact Info    [facility] - Physical Therapy Physical Therapy   [address] [facility] [address] [phone]    [facility] Physical Therapy Physical Therapy   [address] [facility] [id number] [phone]               Tommy Green  NP  01/17/23 1830

## 2023-01-17 NOTE — FIRST PROVIDER EVALUATION
Emergency Department TeleTriage Encounter Note      CHIEF COMPLAINT    Chief Complaint   Patient presents with    Back Pain     Patient presented to ER with c/o lower back pain x 2 weeks of unknown cause. Pt stated she was treated in ER 2 weeks ago for the same problem.        VITAL SIGNS   Initial Vitals [01/17/23 0910]   BP Pulse Resp Temp SpO2   137/87 91 18 98.1 °F (36.7 °C) 97 %      MAP       --            ALLERGIES    Review of patient's allergies indicates:  No Known Allergies    PROVIDER TRIAGE NOTE  This is a teletriage evaluation of a 32 y.o. female presenting to the ED complaining of back pain. Patient reports low back pain that started 2 weeks ago. She denies trauma or injury. She has been taking tylenol without relief. She denies urinary symptoms, numbness, tingling, or weakness.     Patient is alert and oriented. She speaks in complete sentences. She is sitting upright in the chair in no distress. She is requesting a CT scan of her back.    Initial orders will be placed and care will be transferred to an alternate provider when patient is roomed for a full evaluation. Any additional orders and the final disposition will be determined by that provider.         ORDERS  Labs Reviewed   URINALYSIS, REFLEX TO URINE CULTURE   POCT URINE PREGNANCY       ED Orders (720h ago, onward)      Start Ordered     Status Ordering Provider    01/17/23 0931 01/17/23 0930  Urinalysis, Reflex to Urine Culture Urine, Clean Catch  STAT         Ordered MALACHI STAPLETON.    01/17/23 0931 01/17/23 0930  POCT urine pregnancy  Once         Ordered MALACHI STAPLETON              Virtual Visit Note: The provider triage portion of this emergency department evaluation and documentation was performed via [employer], a HIPAA-compliant telemedicine application, in concert with a tele-presenter in the room. A face to face patient evaluation with one of my colleagues will occur once the patient is placed in an emergency department  room.      DISCLAIMER: This note was prepared with [employer] voice recognition transcription software. Garbled syntax, mangled pronouns, and other bizarre constructions may be attributed to that software system.

## 2023-01-17 NOTE — ED TRIAGE NOTES
Pt presents to ED c/o lower back pain radiating up back and down legs x2 weeks. Pt also reports migraines with intermittent numbness to neck and L side. States she was seen 2 weeks ago for same s/s with back/spine referral and muscle relaxers but has not followed up or filled her meds. No neuro deficits noted. Ambulatory to room 3 from triage.

## 2023-01-19 ENCOUNTER — PATIENT OUTREACH (OUTPATIENT)
Dept: EMERGENCY MEDICINE | Facility: OTHER | Age: 33
End: 2023-01-19
Payer: MEDICAID

## 2023-01-24 ENCOUNTER — HOSPITAL ENCOUNTER (EMERGENCY)
Facility: OTHER | Age: 33
Discharge: HOME | End: 2023-01-25
Attending: EMERGENCY MEDICINE
Payer: MEDICAID

## 2023-01-24 DIAGNOSIS — R07.89 CHEST TIGHTNESS: ICD-10-CM

## 2023-01-24 DIAGNOSIS — R42 DIZZINESS: Primary | ICD-10-CM

## 2023-01-24 LAB
ANION GAP SERPL CALC-SCNC: 7 MMOL/L (ref 8–16)
BASOPHILS # BLD AUTO: 0.04 K/UL (ref 0–0.2)
BASOPHILS NFR BLD: 0.4 % (ref 0–1.9)
BUN SERPL-MCNC: 14 MG/DL (ref 6–20)
CALCIUM SERPL-MCNC: 9.1 MG/DL (ref 8.7–10.5)
CHLORIDE SERPL-SCNC: 107 MMOL/L (ref 95–110)
CO2 SERPL-SCNC: 24 MMOL/L (ref 23–29)
CREAT SERPL-MCNC: 0.9 MG/DL (ref 0.5–1.4)
DIFFERENTIAL METHOD: ABNORMAL
EOSINOPHIL # BLD AUTO: 0.1 K/UL (ref 0–0.5)
EOSINOPHIL NFR BLD: 1.1 % (ref 0–8)
ERYTHROCYTE [DISTWIDTH] IN BLOOD BY AUTOMATED COUNT: 12.7 % (ref 11.5–14.5)
EST. GFR  (NO RACE VARIABLE): >60 ML/MIN/1.73 M^2
GLUCOSE SERPL-MCNC: 89 MG/DL (ref 70–110)
HCT VFR BLD AUTO: 38.9 % (ref 37–48.5)
HGB BLD-MCNC: 12.8 G/DL (ref 12–16)
IMM GRANULOCYTES # BLD AUTO: 0.03 K/UL (ref 0–0.04)
IMM GRANULOCYTES NFR BLD AUTO: 0.3 % (ref 0–0.5)
LYMPHOCYTES # BLD AUTO: 2.3 K/UL (ref 1–4.8)
LYMPHOCYTES NFR BLD: 25.4 % (ref 18–48)
MCH RBC QN AUTO: 28.7 PG (ref 27–31)
MCHC RBC AUTO-ENTMCNC: 32.9 G/DL (ref 32–36)
MCV RBC AUTO: 87 FL (ref 82–98)
MONOCYTES # BLD AUTO: 0.9 K/UL (ref 0.3–1)
MONOCYTES NFR BLD: 9.6 % (ref 4–15)
NEUTROPHILS # BLD AUTO: 5.7 K/UL (ref 1.8–7.7)
NEUTROPHILS NFR BLD: 63.2 % (ref 38–73)
NRBC BLD-RTO: 0 /100 WBC
PLATELET # BLD AUTO: 243 K/UL (ref 150–450)
PMV BLD AUTO: 8.7 FL (ref 9.2–12.9)
POCT GLUCOSE: 79 MG/DL (ref 70–110)
POTASSIUM SERPL-SCNC: 3.4 MMOL/L (ref 3.5–5.1)
RBC # BLD AUTO: 4.46 M/UL (ref 4–5.4)
SODIUM SERPL-SCNC: 138 MMOL/L (ref 136–145)
TROPONIN I SERPL DL<=0.01 NG/ML-MCNC: 0.01 NG/ML (ref 0–0.03)
WBC # BLD AUTO: 9.07 K/UL (ref 3.9–12.7)

## 2023-01-24 PROCEDURE — 93010 ELECTROCARDIOGRAM REPORT: CPT | Mod: ,,, | Performed by: INTERNAL MEDICINE

## 2023-01-24 PROCEDURE — 84484 ASSAY OF TROPONIN QUANT: CPT | Performed by: EMERGENCY MEDICINE

## 2023-01-24 PROCEDURE — 93010 EKG 12-LEAD: ICD-10-PCS | Mod: ,,, | Performed by: INTERNAL MEDICINE

## 2023-01-24 PROCEDURE — 25000003 PHARM REV CODE 250: Performed by: EMERGENCY MEDICINE

## 2023-01-24 PROCEDURE — 82962 GLUCOSE BLOOD TEST: CPT

## 2023-01-24 PROCEDURE — 80048 BASIC METABOLIC PNL TOTAL CA: CPT | Performed by: EMERGENCY MEDICINE

## 2023-01-24 PROCEDURE — 93005 ELECTROCARDIOGRAM TRACING: CPT

## 2023-01-24 PROCEDURE — 99284 EMERGENCY DEPT VISIT MOD MDM: CPT | Mod: 25

## 2023-01-24 PROCEDURE — 36415 COLL VENOUS BLD VENIPUNCTURE: CPT | Performed by: EMERGENCY MEDICINE

## 2023-01-24 PROCEDURE — 85025 COMPLETE CBC W/AUTO DIFF WBC: CPT | Performed by: EMERGENCY MEDICINE

## 2023-01-24 PROCEDURE — 96360 HYDRATION IV INFUSION INIT: CPT

## 2023-01-24 RX ORDER — POTASSIUM CHLORIDE 20 MEQ/1
20 TABLET, EXTENDED RELEASE ORAL
Status: COMPLETED | OUTPATIENT
Start: 2023-01-24 | End: 2023-01-24

## 2023-01-24 RX ORDER — SODIUM CHLORIDE 9 MG/ML
1000 INJECTION, SOLUTION INTRAVENOUS
Status: COMPLETED | OUTPATIENT
Start: 2023-01-24 | End: 2023-01-25

## 2023-01-24 RX ADMIN — SODIUM CHLORIDE 1000 ML: 0.9 INJECTION, SOLUTION INTRAVENOUS at 11:01

## 2023-01-24 RX ADMIN — POTASSIUM CHLORIDE 20 MEQ: 1500 TABLET, EXTENDED RELEASE ORAL at 11:01

## 2023-01-25 ENCOUNTER — TELEPHONE (OUTPATIENT)
Dept: ADMINISTRATIVE | Facility: OTHER | Age: 33
End: 2023-01-25
Payer: MEDICAID

## 2023-01-25 VITALS
HEART RATE: 76 BPM | RESPIRATION RATE: 16 BRPM | DIASTOLIC BLOOD PRESSURE: 69 MMHG | OXYGEN SATURATION: 100 % | TEMPERATURE: 99 F | SYSTOLIC BLOOD PRESSURE: 117 MMHG

## 2023-01-25 NOTE — ED NOTES
LOC: The patient is awake, alert, and oriented to self, place, time, and situation. Pt is calm and cooperative. Affect is appropriate.  Speech is appropriate and clear.     APPEARANCE: Patient resting on stretcher in no acute distress.  Patient is clean and well groomed.    SKIN: The skin is warm and dry; color consistent with ethnicity.  Patient has normal skin turgor and moist mucus membranes.  Skin intact; no breakdown or bruising noted.     MUSCULOSKELETAL: Patient moving upper and lower extremities without difficulty; denies pain in the extremities or back.  Denies weakness.     RESPIRATORY: Airway is open and patent. Respirations spontaneous, even, easy, and non-labored.  Patient has a normal effort and rate.  No accessory muscle use noted. Denies cough.     CARDIAC:  Normal rate noted.  No peripheral edema noted. Pt reporting mid sternal chest tightness radiating into the right side of the neck.    ABDOMEN: Soft and non tender to palpation.  No distention noted. Pt denies abdominal pain; denies nausea, vomiting, diarrhea, or constipation.    NEUROLOGIC: Eyes open spontaneously.  Behavior appropriate to situation.  Follows commands; facial expression symmetrical.  Purposeful motor response noted; normal sensation in all extremities. Pt denies headache; reports lightheadedness and dizziness; denies visual disturbances; denies loss of balance; denies unilateral weakness.

## 2023-01-25 NOTE — PROGRESS NOTES
Paula Negrete LPN  ED Navigator  Emergency Department    [place] [facility] [place]  Role: Community Health Worker    Date: 01/25/2023  Patient Name: Ken Bills  MRN: [id number]  PCP: Primary Doctor No    Assessment:     Ken Bills is a 32 y.o. female who has presented to ED for Chest Pain. Patient has visited the ED 5 times in the past 3 months. Patient did not contact PCP.     ED Navigator Initial Assessment    ED Navigator Enrollment Documentation  Consent to Services  Does patient consent to completing the assessment?: Yes  Contact  Method of Initial Contact: Phone  Transportation  Does the patient have issues with Transportation?: No  Does the patient have transportation to and from healthcare appointments?: Yes  Insurance Coverage  Do you have coverage/adequate coverage?: Yes  Type/kind of coverage: [facility]  Is patient able to afford co-pays/deductibles?: Yes  Is patient able to afford HME or supplies?: Yes  Does patient have an established [facility] PCP?: Yes  Able to access?: Yes  Does the patient have a lack of adequate coverage?: No  Specialist Appointment  Did the patient come to the ED to see a specialist?: Yes  Does the patient have a pending specialist referral?: Yes  Does the patient have a specialist appointment made?: No  PCP Follow Up Appointment  Has the patient had an appointment with a primary care provider in the past year?: Yes  Approximate date: 1/11/23  Provider: Blanca Cordova NP  Does the patient have a follow up appontment with a PCP?: Yes  Upcoming appointment date: 1/25/23  Provider: Blanca Cordova NP  When was the last time you saw your PCP?: 1/11/23  Medications  Is patient able to afford medication?: Yes  Is patient unable to get medication due to lack of transportation?: No  Psychological  Does the patient have psycho-social concerns?: Yes  What concerns does the patient have?: Anxiety and/or Depression, Difficulty coping with  life situations  Food  Does the patient have concerns about food?: No  Communication/Education  Does the patient have limited English proficiency/English not primary language?: No  Does patient have low literacy and/or low health literacy?: Yes  Does patient have concerns with care?: No  Does patient have dissatisfaction with care?: No  Other Financial Concerns  Does the patient have immediate financial distress?: No  Does the patient have general financial concerns?: Yes  Other Social Barriers/Concerns  Does the patient have any additional barriers or concerns?: Unable to afford utilities  Primary Barrier  Barriers identified: Cognitive barrier (health literacy, language and communication, etc.)  Root Cause of ED Utilization: Patient Knowledge/Low Health Literacy  Plan to address Patient Knowledge/Low Health Literacy: Provided information for [facility] On [facility] 24/7 Nurse triage line [phone] or 1-866-[facility] ([phone])  Next steps: Provided Education  Was education/educational materials provided surrounding PCP services/creating a medical home?: Yes Was education verbal or written?: Written     Was education/educational materials provided surrounding low cost, healthy foods?: Yes Was education verbal or written?: Written     Was education/educational materials provided surrounding other items? If so, use comment to explain.: Yes (Comment: Utility resources, behavioral health resources) Was education verbal or written?: Written   Plan: Utility payment assistance resource given for their region  Expected Date of Follow Up 1: 4/3/23  Additional Documentation: Spoke with patient today and she was agreeable to enrollment and subsequent F/U calls. Pt. Would like behavioral health resources for anxiety. Pt. Is established with [facility] and is being seen today and is currently at the clinic. Pt. Denies smoking. Pt. Would like resources for utility assistance. [facility] form, [place] Virtual Visit  Flyer, [facility] PCP scheduling assistance, [facility] on call RN#, and Heart Healthy Diet education all sent to email as well.         Social History     Socioeconomic History    Marital status: Single   Tobacco Use    Smoking status: Never    Smokeless tobacco: Never   Substance and Sexual Activity    Alcohol use: Not Currently     Comment: rarely    Drug use: No    Sexual activity: Yes     Partners: Male     Birth control/protection: None     Social Determinants of Health     Financial Resource Strain: Medium Risk    Difficulty of Paying Living Expenses: Somewhat hard   Food Insecurity: No Food Insecurity    Worried About Running Out of Food in the Last Year: Never true    Ran Out of Food in the Last Year: Never true   Transportation Needs: No Transportation Needs    Lack of Transportation (Medical): No    Lack of Transportation (Non-Medical): No   Stress: Stress Concern Present    Feeling of Stress : Rather much   Social Connections: Unknown    Frequency of Social Gatherings with Friends and Family: More than three times a week    Marital Status: Never    Housing Stability: Unknown    Unable to Pay for Housing in the Last Year: No    Unstable Housing in the Last Year: No       Plan:   Spoke with patient today and she was agreeable to enrollment and subsequent F/U calls. Pt. Would like behavioral health resources for anxiety. Pt. Is established with [facility] and is being seen today and is currently at the clinic. Pt. Denies smoking. Pt. Would like resources for utility assistance. [facility] form, [place] Virtual Visit Flyer, [facility] PCP scheduling assistance, [facility] on call RN#, and Heart Healthy Diet education all sent to email as well.

## 2023-01-25 NOTE — ED PROVIDER NOTES
"Encounter Date: 2023    SCRIBE #1 NOTE: I, Henry Cevallos, am scribing for, and in the presence of,  Lin Bird MD.     History     Chief Complaint   Patient presents with    Chest Pain     Pt arrives via EMS for chest tightness, dizziness, and "feeling faint" X 1 hour.      Time seen by provider: 9:56 PM    This is a 32 y.o. female who presents with complaint of chest tightness and right sided neck pain onset approximately 7:00 PM earlier tonight. Associated symptoms include generalized weakness, lightheadedness, numbness, paresthesias, but she denies nausea, vomiting, headache, swelling, hemoptysis, or abdominal pain. She endorses that the neck pain radiated into her upper back and she describes the sensation as a "numbness and tingling." She does work at a [employer] and is on her feet often, but she denies any recent injuries or long periods of sitting. Pt does not take prescription medication, but she does receive birth control injections. Aside from this, she denies smoking tobacco. She also denies any prevalent FMHx other than a relative with infrequent seizures. This is the extent of the patient's complaints at this time.    The history is provided by the patient.   Review of patient's allergies indicates:  No Known Allergies  Past Medical History:   Diagnosis Date    Allergic      Past Surgical History:   Procedure Laterality Date     SECTION N/A 2018    Procedure:  SECTION;  Surgeon: Jesi Iqbal MD;  Location: [facility];  Service: OB/GYN;  Laterality: N/A;    NO PAST SURGERIES       Family History   Problem Relation Age of Onset    Breast cancer Neg Hx     Colon cancer Neg Hx     Ovarian cancer Neg Hx      Social History     Tobacco Use    Smoking status: Never    Smokeless tobacco: Never   Substance Use Topics    Alcohol use: Not Currently     Comment: rarely    Drug use: No     Review of Systems   Constitutional:  Negative for fever.   HENT:  Negative " for sore throat.    Respiratory:  Positive for chest tightness. Negative for cough and shortness of breath.    Cardiovascular:  Negative for chest pain.   Gastrointestinal:  Negative for abdominal pain, nausea and vomiting.   Genitourinary:  Negative for dysuria.   Musculoskeletal:  Positive for back pain and neck pain. Negative for joint swelling.   Skin:  Negative for rash.   Neurological:  Positive for weakness, light-headedness and numbness. Negative for headaches.        Positive for paresthesias.    Hematological:  Does not bruise/bleed easily.   All other systems reviewed and are negative.    Physical Exam     Initial Vitals   BP Pulse Resp Temp SpO2   01/24/23 2137 01/24/23 2137 01/24/23 2137 01/24/23 2156 01/24/23 2137   (!) 145/80 95 16 98.5 °F (36.9 °C) 99 %      MAP       --                Physical Exam    Constitutional: She appears well-developed and well-nourished. She is not diaphoretic. No distress.   HENT:   Head: Normocephalic and atraumatic.   Right Ear: External ear normal.   Left Ear: External ear normal.   Eyes: Conjunctivae and EOM are normal. Right eye exhibits no discharge. Left eye exhibits no discharge.   Neck: Neck supple. No tracheal deviation present. No JVD present.   Cardiovascular:  Normal rate, regular rhythm, S1 normal, S2 normal, normal heart sounds and intact distal pulses.     Exam reveals no gallop and no friction rub.       No murmur heard.  Pulmonary/Chest: Breath sounds normal. No stridor. No respiratory distress. She has no wheezes. She has no rhonchi. She has no rales. She exhibits no tenderness.   No chest wall tenderness.    Abdominal: Abdomen is soft. She exhibits no distension. There is no abdominal tenderness. There is no rebound and no guarding.   Musculoskeletal:         General: No tenderness or edema. Normal range of motion.      Cervical back: Neck supple.      Comments: No calf tenderness or edema.      Neurological: She is alert and oriented to person, place,  and time. GCS score is 15. GCS eye subscore is 4. GCS verbal subscore is 5. GCS motor subscore is 6.   Skin: Skin is warm and dry.   Psychiatric: She has a normal mood and affect.       ED Course   Procedures  Labs Reviewed   CBC W/ AUTO DIFFERENTIAL - Abnormal; Notable for the following components:       Result Value    MPV 8.7 (*)     All other components within normal limits   BASIC METABOLIC PANEL - Abnormal; Notable for the following components:    Potassium 3.4 (*)     Anion Gap 7 (*)     All other components within normal limits   TROPONIN I   POCT URINE PREGNANCY   POCT GLUCOSE     EKG Readings: (Independently Interpreted)   Normal sinus rhythm. Rate of 87. Normal intervals. No ST or T wave changes. Previous EKG was December 2022.        Imaging Results    None          Medications   0.9%  NaCl infusion (1,000 mLs Intravenous New Bag 1/24/23 2311)   potassium chloride SA CR tablet 20 mEq (20 mEq Oral Given 1/24/23 2325)     Medical Decision Making:   History:   Old Medical Records: I decided to obtain old medical records.  Independently Interpreted Test(s):   I have ordered and independently interpreted EKG Reading(s) - see prior notes  Clinical Tests:   Lab Tests: Ordered and Reviewed  Medical Tests: Ordered and Reviewed  Additional MDM:   Comments: 32-year-old female with no significant past medical history presents complaining of dizziness, midsternal chest tightness, back and neck pain for approximately 3 hours.  The symptoms started while the patient was talking to her friend on the phone.  She reports being able to perform all of her regular daily activities today which working which is strenuous.    On initial exam the patient appeared very anxious.  This is her 4th ED visit this month with different complaints.  She states that every time she experiences any symptoms, she gets very worried and calls EMS because it is just her and her 4-year-old son at home.    Triage vital signs were within normal  limits and physical exam was otherwise unremarkable.  Her EKG was a normal sinus rhythm without ischemic changes.  Lab work significant only for a potassium of 3.4 but otherwise within normal limits.  P.o. potassium was given.     Patient's workup today is reassuring as has been her evaluations during her recent ED visits.  She was given strict ED return precautions and was otherwise urged to follow-up with her PCP in clinic for further evaluation..   Heart Score:    History:          Slightly suspicious.  ECG:             Normal  Age:               Less than 45 years  Risk factors: no risk factors known  Troponin:       Less than or equal to normal limit  Final Score: 0       Scribe Attestation:   Scribe #1: I performed the above scribed service and the documentation accurately describes the services I performed. I attest to the accuracy of the note.            Physician Attestation for Scribe: I, Lin Bird , reviewed documentation as scribed in my presence, which is both accurate and complete.       Clinical Impression:   Final diagnoses:  [R07.89] Chest tightness  [R42] Dizziness (Primary)        ED Disposition Condition    Discharge Stable          ED Prescriptions    None       Follow-up Information       Follow up With Specialties Details Why Contact Info Additional Information    [facility] Emergency Medicine Go to  If symptoms worsen [address] [address] [phone]     primary care  Call        [facility] Card[facility] Cardiology Call   [address], [address]  [place] [address] [phone] [address]             Lin Bird MD  01/25/23 0121

## 2023-01-25 NOTE — TELEPHONE ENCOUNTER
Not able to reach patient by phone to discuss scheduling Cardiology appointment. Appointment has been scheduled and mailed to her home.

## 2023-01-25 NOTE — ED TRIAGE NOTES
Pt presents to the ER with complaints of sudden onset of substernal chest tightness with radiation into the right side of the neck that started approximately two hours ago. Pt also reporting some dizziness and feeling as if she may pass out.

## 2023-04-03 ENCOUNTER — HOSPITAL ENCOUNTER (EMERGENCY)
Facility: OTHER | Age: 33
Discharge: HOME | End: 2023-04-03
Attending: EMERGENCY MEDICINE
Payer: MEDICAID

## 2023-04-03 VITALS
SYSTOLIC BLOOD PRESSURE: 126 MMHG | DIASTOLIC BLOOD PRESSURE: 89 MMHG | BODY MASS INDEX: 25.06 KG/M2 | OXYGEN SATURATION: 100 % | RESPIRATION RATE: 16 BRPM | HEART RATE: 79 BPM | WEIGHT: 160 LBS | TEMPERATURE: 98 F

## 2023-04-03 DIAGNOSIS — R20.2 PARESTHESIA: ICD-10-CM

## 2023-04-03 DIAGNOSIS — U07.1 COVID-19 VIRUS INFECTION: Primary | ICD-10-CM

## 2023-04-03 LAB
ALBUMIN SERPL BCP-MCNC: 3.6 G/DL (ref 3.5–5.2)
ALP SERPL-CCNC: 86 U/L (ref 55–135)
ALT SERPL W/O P-5'-P-CCNC: 32 U/L (ref 10–44)
ANION GAP SERPL CALC-SCNC: 8 MMOL/L (ref 8–16)
AST SERPL-CCNC: 20 U/L (ref 10–40)
B-HCG UR QL: NEGATIVE
BASOPHILS # BLD AUTO: 0.05 K/UL (ref 0–0.2)
BASOPHILS NFR BLD: 0.5 % (ref 0–1.9)
BILIRUB SERPL-MCNC: 0.3 MG/DL (ref 0.1–1)
BUN SERPL-MCNC: 17 MG/DL (ref 6–20)
CALCIUM SERPL-MCNC: 8.8 MG/DL (ref 8.7–10.5)
CHLORIDE SERPL-SCNC: 109 MMOL/L (ref 95–110)
CO2 SERPL-SCNC: 23 MMOL/L (ref 23–29)
CREAT SERPL-MCNC: 0.9 MG/DL (ref 0.5–1.4)
CRP SERPL-MCNC: 0.4 MG/L (ref 0–8.2)
CTP QC/QA: YES
CTP QC/QA: YES
DIFFERENTIAL METHOD: ABNORMAL
EOSINOPHIL # BLD AUTO: 0.2 K/UL (ref 0–0.5)
EOSINOPHIL NFR BLD: 2.2 % (ref 0–8)
ERYTHROCYTE [DISTWIDTH] IN BLOOD BY AUTOMATED COUNT: 12.9 % (ref 11.5–14.5)
EST. GFR  (NO RACE VARIABLE): >60 ML/MIN/1.73 M^2
GLUCOSE SERPL-MCNC: 99 MG/DL (ref 70–110)
HCT VFR BLD AUTO: 41.4 % (ref 37–48.5)
HGB BLD-MCNC: 13.6 G/DL (ref 12–16)
IMM GRANULOCYTES # BLD AUTO: 0.02 K/UL (ref 0–0.04)
IMM GRANULOCYTES NFR BLD AUTO: 0.2 % (ref 0–0.5)
LYMPHOCYTES # BLD AUTO: 2.6 K/UL (ref 1–4.8)
LYMPHOCYTES NFR BLD: 28.1 % (ref 18–48)
MCH RBC QN AUTO: 29 PG (ref 27–31)
MCHC RBC AUTO-ENTMCNC: 32.9 G/DL (ref 32–36)
MCV RBC AUTO: 88 FL (ref 82–98)
MONOCYTES # BLD AUTO: 0.7 K/UL (ref 0.3–1)
MONOCYTES NFR BLD: 7.1 % (ref 4–15)
NEUTROPHILS # BLD AUTO: 5.8 K/UL (ref 1.8–7.7)
NEUTROPHILS NFR BLD: 61.9 % (ref 38–73)
NRBC BLD-RTO: 0 /100 WBC
PLATELET # BLD AUTO: 274 K/UL (ref 150–450)
PMV BLD AUTO: 8.7 FL (ref 9.2–12.9)
POTASSIUM SERPL-SCNC: 3.5 MMOL/L (ref 3.5–5.1)
PROT SERPL-MCNC: 7.3 G/DL (ref 6–8.4)
RBC # BLD AUTO: 4.69 M/UL (ref 4–5.4)
SARS-COV-2 RDRP RESP QL NAA+PROBE: POSITIVE
SODIUM SERPL-SCNC: 140 MMOL/L (ref 136–145)
TSH SERPL DL<=0.005 MIU/L-ACNC: 1.1 UIU/ML (ref 0.4–4)
VIT B12 SERPL-MCNC: 390 PG/ML (ref 210–950)
WBC # BLD AUTO: 9.39 K/UL (ref 3.9–12.7)

## 2023-04-03 PROCEDURE — 99284 EMERGENCY DEPT VISIT MOD MDM: CPT

## 2023-04-03 PROCEDURE — 85025 COMPLETE CBC W/AUTO DIFF WBC: CPT | Performed by: EMERGENCY MEDICINE

## 2023-04-03 PROCEDURE — 86140 C-REACTIVE PROTEIN: CPT | Performed by: EMERGENCY MEDICINE

## 2023-04-03 PROCEDURE — 63600175 PHARM REV CODE 636 W HCPCS: Performed by: EMERGENCY MEDICINE

## 2023-04-03 PROCEDURE — 96372 THER/PROPH/DIAG INJ SC/IM: CPT | Performed by: EMERGENCY MEDICINE

## 2023-04-03 PROCEDURE — 80053 COMPREHEN METABOLIC PANEL: CPT | Performed by: EMERGENCY MEDICINE

## 2023-04-03 PROCEDURE — 81025 URINE PREGNANCY TEST: CPT | Performed by: EMERGENCY MEDICINE

## 2023-04-03 PROCEDURE — 82607 VITAMIN B-12: CPT | Performed by: EMERGENCY MEDICINE

## 2023-04-03 PROCEDURE — 84443 ASSAY THYROID STIM HORMONE: CPT | Performed by: EMERGENCY MEDICINE

## 2023-04-03 RX ORDER — KETOROLAC TROMETHAMINE 30 MG/ML
15 INJECTION, SOLUTION INTRAMUSCULAR; INTRAVENOUS
Status: COMPLETED | OUTPATIENT
Start: 2023-04-03 | End: 2023-04-03

## 2023-04-03 RX ADMIN — KETOROLAC TROMETHAMINE 15 MG: 30 INJECTION, SOLUTION INTRAMUSCULAR at 01:04

## 2023-04-03 NOTE — ED PROVIDER NOTES
Encounter Date: 4/3/2023       History     Chief Complaint   Patient presents with    COVID-19 Concerns     Body aches w/ a headache since 9pm.     32-year-old female with no known past medical history presents for evaluation of headache and numbness.  She states tonight she started having a right-sided pressure headache with arm tingling, initially when her right arm and now in her left arm.  She states she has had multiple episodes like this in the past and has come to the ED for it but is frustrated because no one has told her any diagnosis before.  She states she gets the numbness almost every day in various parts of her body, but the headache is less frequent.  She denies any associated weakness or difficulty talking.  She denies any cough, congestion, fevers, body aches, or other complaints    Review of patient's allergies indicates:  No Known Allergies  Past Medical History:   Diagnosis Date    Allergic      Past Surgical History:   Procedure Laterality Date     SECTION N/A 2018    Procedure:  SECTION;  Surgeon: Jesi Iqbal MD;  Location: [facility];  Service: OB/GYN;  Laterality: N/A;    NO PAST SURGERIES       Family History   Problem Relation Age of Onset    Breast cancer Neg Hx     Colon cancer Neg Hx     Ovarian cancer Neg Hx      Social History     Tobacco Use    Smoking status: Never    Smokeless tobacco: Never   Substance Use Topics    Alcohol use: Not Currently     Comment: rarely    Drug use: No     Review of Systems   Constitutional:  Negative for fever.   HENT:  Negative for congestion.    Eyes:  Negative for redness.   Respiratory:  Negative for shortness of breath.    Cardiovascular:  Negative for chest pain.   Gastrointestinal:  Negative for abdominal pain.   Genitourinary:  Negative for dysuria.   Skin:  Negative for rash.   Neurological:  Negative for headaches.   Psychiatric/Behavioral:  Negative for confusion.      Physical Exam     Initial Vitals [23 0026]    BP Pulse Resp Temp SpO2   (!) 124/94 84 17 98.4 °F (36.9 °C) 100 %      MAP       --         Physical Exam    Constitutional: She appears well-developed and well-nourished. She is not diaphoretic. No distress.   HENT:   Head: Normocephalic and atraumatic.   Eyes: Conjunctivae are normal.   Neck: Neck supple.   Cardiovascular:  Normal rate, regular rhythm, S1 normal, S2 normal, normal heart sounds and intact distal pulses.           No murmur heard.  Pulmonary/Chest: Breath sounds normal. No respiratory distress. She has no wheezes. She has no rhonchi. She has no rales.   Abdominal: Abdomen is soft. There is no abdominal tenderness. There is no rebound and no guarding.   Musculoskeletal:         General: No edema.      Cervical back: Neck supple.     Neurological: She is alert and oriented to person, place, and time. She has normal strength. No cranial nerve deficit or sensory deficit.   Skin: Skin is warm and dry.   Psychiatric:   Anxious affect       ED Course   Procedures  Labs Reviewed   CBC W/ AUTO DIFFERENTIAL - Abnormal; Notable for the following components:       Result Value    MPV 8.7 (*)     All other components within normal limits   SARS-COV-2 RDRP GENE - Abnormal; Notable for the following components:    POC Rapid COVID Positive (*)     All other components within normal limits   POCT URINE PREGNANCY - Normal   COMPREHENSIVE METABOLIC PANEL   C-REACTIVE PROTEIN   TSH   VITAMIN B12          Imaging Results    None          Medications   ketorolac injection 15 mg (15 mg Intramuscular Given 4/3/23 0146)     Medical Decision Making:   Initial Assessment:       32-year-old female with no known past medical history presents for evaluation of headache and numbness, onset tonight.  Headache described as right-sided pressure, similar previous episodes, she describes numbness as a tingling, initially in her right arm and now in her left.  She states she is had multiple similar previous episodes and coming to  the ED for it with no definite diagnosis.  Per chart review she was seen multiple times with similar presentations in January, symptoms attributed to complex migraine or anxiety.  She has been unable to follow-up with Neurology due to lack of available appointments.  She states she gets these numbness episodes regularly, affecting all her extremities in a migratory pattern, but not always associated with headache.  She denies any clear precipitant, states that she does have some degree of stress and anxiety but no previous panic attacks.  On exam patient somewhat anxious appearing but with completely normal neuro exam, with normal sensation and strength in all extremities.  She did have CT head about 2 years ago on her initial presentation where she had right leg paresthesias that was normal, no indication for repeat at this time now, no concerning symptoms to suggest CVA, brain mass, or ICH.  Migratory pattern of her symptoms make anxiety component or somatization more likely, less likely some sort of peripheral neuropathy.  Patient denies any history of regular alcohol or drug use, so will check basic labs including TSH and B12.    COVID-19 rapid test done on patient arrival and was positive, though she has no other specific COVID symptoms such as cough, fever, congestion, fatigue, or SOB.  She states she has had this headache and paresthesias before and she has had COVID 2 years ago but she was asymptomatic then.  Headache and paresthesias much improved in ED after IM Toradol.  Basic labs reassuring with no significant electrolyte abnormality, normal CRP.  TSH/B12 pending, but patient can follow-up with her PCP for results, and continue to try on Neurology follow-up for further workup as indicated.  She is also advised on supportive care for any development of specific COVID symptoms, and return precautions for any worsening.                        Clinical Impression:   Final diagnoses:  [U07.1] COVID-19 virus  infection (Primary)  [R20.2] Paresthesia        ED Disposition Condition    Discharge Stable          ED Prescriptions    None       Follow-up Information       Follow up With Specialties Details Why Contact Info    Primary Care Provider  Schedule an appointment as soon as possible for a visit in 3 days               Heber Buck MD  04/03/23 1417

## 2023-04-24 ENCOUNTER — OFFICE VISIT (OUTPATIENT)
Dept: CARDIOLOGY | Facility: CLINIC | Age: 33
End: 2023-04-24
Attending: INTERNAL MEDICINE
Payer: MEDICAID

## 2023-04-24 VITALS
DIASTOLIC BLOOD PRESSURE: 84 MMHG | SYSTOLIC BLOOD PRESSURE: 124 MMHG | HEART RATE: 68 BPM | WEIGHT: 172.38 LBS | BODY MASS INDEX: 27.06 KG/M2 | OXYGEN SATURATION: 100 % | HEIGHT: 67 IN

## 2023-04-24 DIAGNOSIS — R07.2 PRECORDIAL PAIN: ICD-10-CM

## 2023-04-24 DIAGNOSIS — R07.89 CHEST TIGHTNESS: ICD-10-CM

## 2023-04-24 PROBLEM — R07.9 CHEST PAIN: Status: ACTIVE | Noted: 2023-04-24

## 2023-04-24 PROCEDURE — 93010 ELECTROCARDIOGRAM REPORT: CPT | Mod: S$PBB,,, | Performed by: INTERNAL MEDICINE

## 2023-04-24 PROCEDURE — 3079F DIAST BP 80-89 MM HG: CPT | Mod: CPTII,,, | Performed by: INTERNAL MEDICINE

## 2023-04-24 PROCEDURE — 1160F RVW MEDS BY RX/DR IN RCRD: CPT | Mod: CPTII,,, | Performed by: INTERNAL MEDICINE

## 2023-04-24 PROCEDURE — 3074F SYST BP LT 130 MM HG: CPT | Mod: CPTII,,, | Performed by: INTERNAL MEDICINE

## 2023-04-24 PROCEDURE — 3008F PR BODY MASS INDEX (BMI) DOCUMENTED: ICD-10-PCS | Mod: CPTII,,, | Performed by: INTERNAL MEDICINE

## 2023-04-24 PROCEDURE — 99999 PR PBB SHADOW E&M-EST. PATIENT-LVL III: CPT | Mod: PBBFAC,,, | Performed by: INTERNAL MEDICINE

## 2023-04-24 PROCEDURE — 99213 OFFICE O/P EST LOW 20 MIN: CPT | Mod: PBBFAC | Performed by: INTERNAL MEDICINE

## 2023-04-24 PROCEDURE — 3008F BODY MASS INDEX DOCD: CPT | Mod: CPTII,,, | Performed by: INTERNAL MEDICINE

## 2023-04-24 PROCEDURE — 1160F PR REVIEW ALL MEDS BY PRESCRIBER/CLIN PHARMACIST DOCUMENTED: ICD-10-PCS | Mod: CPTII,,, | Performed by: INTERNAL MEDICINE

## 2023-04-24 PROCEDURE — 99999 PR PBB SHADOW E&M-EST. PATIENT-LVL III: ICD-10-PCS | Mod: PBBFAC,,, | Performed by: INTERNAL MEDICINE

## 2023-04-24 PROCEDURE — 1159F PR MEDICATION LIST DOCUMENTED IN MEDICAL RECORD: ICD-10-PCS | Mod: CPTII,,, | Performed by: INTERNAL MEDICINE

## 2023-04-24 PROCEDURE — 93005 ELECTROCARDIOGRAM TRACING: CPT | Mod: PBBFAC | Performed by: INTERNAL MEDICINE

## 2023-04-24 PROCEDURE — 93005 ELECTROCARDIOGRAM TRACING: CPT

## 2023-04-24 PROCEDURE — 99204 OFFICE O/P NEW MOD 45 MIN: CPT | Mod: S$PBB,25,, | Performed by: INTERNAL MEDICINE

## 2023-04-24 PROCEDURE — 1159F MED LIST DOCD IN RCRD: CPT | Mod: CPTII,,, | Performed by: INTERNAL MEDICINE

## 2023-04-24 PROCEDURE — 93010 PR ELECTROCARDIOGRAM REPORT: ICD-10-PCS | Mod: S$PBB,,, | Performed by: INTERNAL MEDICINE

## 2023-04-24 PROCEDURE — 3079F PR MOST RECENT DIASTOLIC BLOOD PRESSURE 80-89 MM HG: ICD-10-PCS | Mod: CPTII,,, | Performed by: INTERNAL MEDICINE

## 2023-04-24 PROCEDURE — 99204 PR OFFICE/OUTPT VISIT, NEW, LEVL IV, 45-59 MIN: ICD-10-PCS | Mod: S$PBB,25,, | Performed by: INTERNAL MEDICINE

## 2023-04-24 PROCEDURE — 3074F PR MOST RECENT SYSTOLIC BLOOD PRESSURE < 130 MM HG: ICD-10-PCS | Mod: CPTII,,, | Performed by: INTERNAL MEDICINE

## 2023-04-24 NOTE — PROGRESS NOTES
Subjective:     Ken Bills is a 32 y.o. female. She denies any risk factors for vascular disease. She presented to the emergency room with chest tightness in 2/2023. She was released. She is worried about the presence of heart disease. She comes in for evaluation.      Chest Pain   This is a new problem. The current episode started more than 1 month ago. The onset quality is sudden. The problem has been resolved. The pain is present in the substernal region. The pain is at a severity of 2/10. The pain is mild. The quality of the pain is described as numbness and tightness. The pain does not radiate. Associated symptoms include numbness. Pertinent negatives include no abdominal pain, back pain, claudication, cough, dizziness, fever, headaches, hemoptysis, irregular heartbeat, malaise/fatigue, nausea, near-syncope, orthopnea, palpitations, PND, shortness of breath, syncope, vomiting or weakness.   Pertinent negatives for past medical history include no muscle weakness.     Review of Systems   Constitutional: Negative for chills, fever and malaise/fatigue.   HENT:  Negative for nosebleeds and tinnitus.    Eyes:  Negative for double vision, vision loss in left eye and vision loss in right eye.   Cardiovascular:  Positive for chest pain. Negative for claudication, dyspnea on exertion, irregular heartbeat, leg swelling, near-syncope, orthopnea, palpitations, paroxysmal nocturnal dyspnea and syncope.   Respiratory:  Negative for cough, hemoptysis, shortness of breath and wheezing.    Endocrine: Negative for cold intolerance and heat intolerance.   Hematologic/Lymphatic: Negative for bleeding problem. Does not bruise/bleed easily.   Skin:  Negative for color change and rash.   Musculoskeletal:  Negative for back pain, falls, muscle weakness and myalgias.   Gastrointestinal:  Negative for abdominal pain, diarrhea, dysphagia, heartburn, hematemesis, hematochezia, hemorrhoids, jaundice, melena, nausea and vomiting.    Genitourinary:  Negative for dysuria and hematuria.   Neurological:  Positive for numbness and paresthesias. Negative for dizziness, focal weakness, headaches, light-headedness, loss of balance, tremors, vertigo and weakness.   Psychiatric/Behavioral:  Negative for altered mental status, depression and memory loss. The patient is not nervous/anxious.    Allergic/Immunologic: Negative for hives and persistent infections.       Current Outpatient Medications on File Prior to Visit   Medication Sig Dispense Refill    medroxyPROGESTERone (DEPO-PROVERA) 150 mg/mL injection Inject 1 mL (150 mg total) into the muscle every 3 (three) months. for 5 doses 1 mL 4    cetirizine (ZYRTEC) 10 MG tablet Take 1 tablet (10 mg total) by mouth once daily. 30 tablet 0    docusate sodium (COLACE) 100 MG capsule Take 2 capsules (200 mg total) by mouth 2 (two) times daily. (Patient not taking: Reported on 4/24/2023) 40 capsule 0    famotidine (PEPCID) 20 MG tablet Take 1 tablet (20 mg total) by mouth 2 (two) times daily. 20 tablet 0    fluticasone propionate (FLONASE) 50 mcg/actuation nasal spray 1 spray (50 mcg total) by Each Nare route 2 (two) times daily as needed for Rhinitis or Allergies. (Patient not taking: Reported on 4/24/2023) 15 g 0    ibuprofen (ADVIL,MOTRIN) 600 MG tablet Take 1 tablet (600 mg total) by mouth every 6 (six) hours as needed for Pain. (Patient not taking: Reported on 4/24/2023) 20 tablet 0    LIDOcaine (LIDODERM) 5 % Place 1 patch onto the skin once daily. Remove & Discard patch within 12 hours or as directed by MD (Patient not taking: Reported on 4/24/2023) 15 patch 0    ondansetron (ZOFRAN-ODT) 4 MG TbDL Take 1 tablet (4 mg total) by mouth every 8 (eight) hours as needed. (Patient not taking: Reported on 4/24/2023) 15 tablet 0    polyethylene glycol (GLYCOLAX) 17 gram PwPk Take 17 g by mouth 2 (two) times daily as needed (constipation). (Patient not taking: Reported on 4/24/2023) 24 packet 0     No current  "facility-administered medications on file prior to visit.        /84 (BP Location: Left arm, Patient Position: Sitting, BP Method: Medium (Manual))   Pulse 68   Ht 5' 7" (1.702 m)   Wt 78.2 kg (172 lb 6.4 oz)   SpO2 100%   BMI 27.00 kg/m²       Objective:     Physical Exam  Constitutional:       General: She is not in acute distress.     Appearance: Normal appearance. She is well-developed. She is not toxic-appearing or diaphoretic.   HENT:      Head: Normocephalic and atraumatic.      Nose: Nose normal.   Eyes:      General:         Right eye: No discharge.         Left eye: No discharge.      Conjunctiva/sclera:      Right eye: Right conjunctiva is not injected.      Left eye: Left conjunctiva is not injected.      Pupils: Pupils are equal.      Right eye: Pupil is round.      Left eye: Pupil is round.   Neck:      Thyroid: No thyromegaly.      Vascular: No carotid bruit or JVD.   Cardiovascular:      Rate and Rhythm: Normal rate and regular rhythm. No extrasystoles are present.     Chest Wall: PMI is not displaced.      Pulses:           Radial pulses are 2+ on the right side and 2+ on the left side.        Femoral pulses are 2+ on the right side and 2+ on the left side.       Dorsalis pedis pulses are 2+ on the right side and 2+ on the left side.        Posterior tibial pulses are 2+ on the right side and 2+ on the left side.      Heart sounds: S1 normal and S2 normal.     No gallop.   Pulmonary:      Effort: Pulmonary effort is normal.      Breath sounds: Normal breath sounds.   Abdominal:      Palpations: Abdomen is soft.      Tenderness: There is no abdominal tenderness.   Musculoskeletal:      Cervical back: Neck supple.      Right lower leg: Normal. No swelling. No edema.      Left lower leg: Normal. No swelling. No edema.   Lymphadenopathy:      Head:      Right side of head: No submandibular adenopathy.      Left side of head: No submandibular adenopathy.      Cervical: No cervical adenopathy. " "  Skin:     General: Skin is warm and dry.      Findings: No rash.      Nails: There is no clubbing.   Neurological:      General: No focal deficit present.      Mental Status: She is alert and oriented to person, place, and time. She is not disoriented.      Cranial Nerves: No cranial nerve deficit.   Psychiatric:         Attention and Perception: Attention and perception normal.         Mood and Affect: Mood and affect normal.         Speech: Speech normal.         Behavior: Behavior normal.         Thought Content: Thought content normal.         Cognition and Memory: Cognition and memory normal.         Judgment: Judgment normal.       Assessment:      1. Chest tightness    2. Precordial pain        Plan:     Chest Pain   2/2023: ER: Presented with chest pain.   ECG: SR. PRWP.    Appears atypical.   4/24/2023: Do Stress Echo.    2. Primary Care   Dr. Blanca Cordova, [facility].    F/u PRN.    Chase Underwood M.D.      5/31/2023 10:28 AM, Addendum:    5/31/2023: Stress Echo: 10:30 min. No CP. ECG negative. Echo "negative". Normal left ventricular size and systolic function. EF 65%.    I discussed the above test result and the implications of the findings over the phone.    Chase Underwood M.D.    "

## 2023-05-04 ENCOUNTER — HOSPITAL ENCOUNTER (EMERGENCY)
Facility: OTHER | Age: 33
Discharge: HOME | End: 2023-05-04
Attending: EMERGENCY MEDICINE
Payer: MEDICAID

## 2023-05-04 VITALS
DIASTOLIC BLOOD PRESSURE: 81 MMHG | TEMPERATURE: 98 F | BODY MASS INDEX: 27.8 KG/M2 | SYSTOLIC BLOOD PRESSURE: 131 MMHG | RESPIRATION RATE: 16 BRPM | HEART RATE: 2 BPM | WEIGHT: 173 LBS | HEIGHT: 66 IN | OXYGEN SATURATION: 98 %

## 2023-05-04 DIAGNOSIS — R06.02 SHORTNESS OF BREATH: Primary | ICD-10-CM

## 2023-05-04 DIAGNOSIS — R20.2 PARESTHESIAS: ICD-10-CM

## 2023-05-04 LAB
B-HCG UR QL: NEGATIVE
BILIRUB UR QL STRIP: NEGATIVE
CLARITY UR: CLEAR
COLOR UR: COLORLESS
CTP QC/QA: YES
GLUCOSE UR QL STRIP: NEGATIVE
HGB UR QL STRIP: NEGATIVE
KETONES UR QL STRIP: NEGATIVE
LEUKOCYTE ESTERASE UR QL STRIP: NEGATIVE
NITRITE UR QL STRIP: NEGATIVE
PH UR STRIP: 7 [PH] (ref 5–8)
PROT UR QL STRIP: NEGATIVE
SP GR UR STRIP: 1.01 (ref 1–1.03)
URN SPEC COLLECT METH UR: ABNORMAL
UROBILINOGEN UR STRIP-ACNC: NEGATIVE EU/DL

## 2023-05-04 PROCEDURE — 81025 URINE PREGNANCY TEST: CPT | Performed by: EMERGENCY MEDICINE

## 2023-05-04 PROCEDURE — 81003 URINALYSIS AUTO W/O SCOPE: CPT | Performed by: EMERGENCY MEDICINE

## 2023-05-04 PROCEDURE — 93010 ELECTROCARDIOGRAM REPORT: CPT | Mod: ,,, | Performed by: INTERNAL MEDICINE

## 2023-05-04 PROCEDURE — 25000242 PHARM REV CODE 250 ALT 637 W/ HCPCS: Performed by: EMERGENCY MEDICINE

## 2023-05-04 PROCEDURE — 25000003 PHARM REV CODE 250: Performed by: EMERGENCY MEDICINE

## 2023-05-04 PROCEDURE — 99285 EMERGENCY DEPT VISIT HI MDM: CPT | Mod: 25

## 2023-05-04 PROCEDURE — 93010 EKG 12-LEAD: ICD-10-PCS | Mod: ,,, | Performed by: INTERNAL MEDICINE

## 2023-05-04 PROCEDURE — 93005 ELECTROCARDIOGRAM TRACING: CPT

## 2023-05-04 RX ORDER — LORAZEPAM 0.5 MG/1
0.5 TABLET ORAL
Status: COMPLETED | OUTPATIENT
Start: 2023-05-04 | End: 2023-05-04

## 2023-05-04 RX ORDER — IPRATROPIUM BROMIDE AND ALBUTEROL SULFATE 2.5; .5 MG/3ML; MG/3ML
3 SOLUTION RESPIRATORY (INHALATION)
Status: COMPLETED | OUTPATIENT
Start: 2023-05-04 | End: 2023-05-04

## 2023-05-04 RX ORDER — ALBUTEROL SULFATE 90 UG/1
1-2 AEROSOL, METERED RESPIRATORY (INHALATION) EVERY 4 HOURS PRN
Qty: 6.7 G | Refills: 1 | Status: SHIPPED | OUTPATIENT
Start: 2023-05-04 | End: 2024-05-03

## 2023-05-04 RX ORDER — LORAZEPAM 0.5 MG/1
0.5 TABLET ORAL EVERY 6 HOURS PRN
Qty: 10 TABLET | Refills: 0 | Status: SHIPPED | OUTPATIENT
Start: 2023-05-04 | End: 2023-06-03

## 2023-05-04 RX ORDER — ONDANSETRON 4 MG/1
4 TABLET, ORALLY DISINTEGRATING ORAL
Status: COMPLETED | OUTPATIENT
Start: 2023-05-04 | End: 2023-05-04

## 2023-05-04 RX ADMIN — IPRATROPIUM BROMIDE AND ALBUTEROL SULFATE 3 ML: .5; 3 SOLUTION RESPIRATORY (INHALATION) at 11:05

## 2023-05-04 RX ADMIN — ONDANSETRON 4 MG: 4 TABLET, ORALLY DISINTEGRATING ORAL at 10:05

## 2023-05-04 RX ADMIN — LORAZEPAM 0.5 MG: 0.5 TABLET ORAL at 11:05

## 2023-05-05 NOTE — ED PROVIDER NOTES
"Encounter Date: 2023    SCRIBE #1 NOTE: I, Vik Huerta, am scribing for, and in the presence of,  Tarik Telles MD. I have scribed the following portions of the note - Other sections scribed: HPI, ROS, PE.     History     Chief Complaint   Patient presents with    Shortness of Breath     Patient reports shortness of breath and " weakness " and muscle cramps in bilateral legs that started 1 hr pta. Oxygen saturation 100% RA . Vss . NAD noted      Time seen by provider: 10:45 PM    This is a 32 y.o. female who presents with complaint of  ongoing, unchanged episodes of migratory numbness over the past four to five months. She describes episodes originating from her left leg before moving to her hands, arms, and feet. Associated with generalized weakness and nausea. Patient also notes daily episodes of dyspnea for the same duration. She has an appointment with neurology in 11 days. Of note, patient has had COVID twice: one year ago and again several weeks ago. Patient takes birth control. She denies any FHx of medical problems. This is the extent of the patient's complaints at this time.    The history is provided by the patient.   Review of patient's allergies indicates:  No Known Allergies  Past Medical History:   Diagnosis Date    Allergic      Past Surgical History:   Procedure Laterality Date     SECTION N/A 2018    Procedure:  SECTION;  Surgeon: Jesi Iqbal MD;  Location: [facility] L&D;  Service: OB/GYN;  Laterality: N/A;    NO PAST SURGERIES       Family History   Problem Relation Age of Onset    Breast cancer Neg Hx     Colon cancer Neg Hx     Ovarian cancer Neg Hx      Social History     Tobacco Use    Smoking status: Never    Smokeless tobacco: Never   Substance Use Topics    Alcohol use: Not Currently     Comment: rarely    Drug use: No     Review of Systems   Constitutional:  Negative for chills and fever.   HENT:  Negative for congestion and sore throat.  "   Eyes:  Negative for photophobia and redness.   Respiratory:  Positive for shortness of breath. Negative for cough.    Cardiovascular:  Negative for chest pain.   Gastrointestinal:  Positive for nausea. Negative for abdominal pain and vomiting.   Genitourinary:  Negative for dysuria.   Musculoskeletal:  Negative for back pain.   Skin:  Negative for rash.   Neurological:  Positive for weakness and numbness. Negative for light-headedness and headaches.   Psychiatric/Behavioral:  Negative for confusion.      Physical Exam     Initial Vitals [05/04/23 2221]   BP Pulse Resp Temp SpO2   (!) 133/91 85 16 98.3 °F (36.8 °C) 100 %      MAP       --         Physical Exam    Nursing note and vitals reviewed.  Constitutional: She appears well-developed and well-nourished. She is not diaphoretic. No distress.   HENT:   Head: Normocephalic and atraumatic.   Mouth/Throat: Oropharynx is clear and moist and mucous membranes are normal.   Oropharynx is clear and intact.  Moist mucous membranes.   Eyes: Conjunctivae and EOM are normal. Pupils are equal, round, and reactive to light. No scleral icterus.   Conjunctiva are pink, clear, and intact.   Neck: Neck supple.   Normal range of motion.  Cardiovascular:  Normal rate, regular rhythm, S1 normal and S2 normal.           No murmur heard.  Pulmonary/Chest: No respiratory distress.   Slightly diminished on the right when compared to the left.   Musculoskeletal:         General: No tenderness or edema. Normal range of motion.      Cervical back: Normal range of motion and neck supple.     Lymphadenopathy:     She has no cervical adenopathy.   Neurological: She is alert and oriented to person, place, and time.   Cranial nerves II-XII are grossly intact. 5/5 strength and sensation intact to light touch of the bilateral upper and lower extremities.   Skin: Skin is warm and dry. Capillary refill takes less than 2 seconds. No rash noted. No pallor.   Psychiatric: She has a normal mood and  affect. Thought content normal.       ED Course   Procedures  Labs Reviewed   URINALYSIS, REFLEX TO URINE CULTURE - Abnormal; Notable for the following components:       Result Value    Color, UA Colorless (*)     All other components within normal limits    Narrative:     Specimen Source->Urine   POCT URINE PREGNANCY     EKG Readings: (Independently Interpreted)   Normal sinus rhythm. No acute ST or T wave changes. No STEMI.  Ventricular rate of 71.   ECG Results              EKG 12-lead (Final result)  Result time 05/05/23 13:21:47      Final result by Interface, Lab In [facility] (05/05/23 13:21:47)                   Narrative:    Test Reason : R06.02,    Vent. Rate : 071 BPM     Atrial Rate : 071 BPM     P-R Int : 126 ms          QRS Dur : 086 ms      QT Int : 386 ms       P-R-T Axes : -14 076 059 degrees     QTc Int : 419 ms    Normal sinus rhythm  Normal ECG    Confirmed by Chase Underwood MD ([id number]) on 5/5/2023 1:21:38 PM    Referred By: [email or web address]   SELF           Confirmed By:Chase Underwood MD                                     EKG 12-LEAD (Final result)  Result time 06/02/23 13:41:31      Final result by Unknown User (06/02/23 13:41:31)                                      Imaging Results              X-Ray Chest PA And Lateral (Final result)  Result time 05/04/23 23:21:27      Final result by Oskar Rucker MD (05/04/23 23:21:27)                   Impression:      No acute cardiopulmonary process identified.      Electronically signed by: Oskar Rucker MD  Date:    05/04/2023  Time:    23:21               Narrative:    EXAMINATION:  XR CHEST PA AND LATERAL    CLINICAL HISTORY:  Cough, unspecified    TECHNIQUE:  PA and lateral views of the chest were performed.    COMPARISON:  None    FINDINGS:  Cardiac silhouette is normal in size.  Lungs are symmetrically expanded.  No evidence of focal consolidative process, pneumothorax, or significant pleural effusion.  No acute osseous abnormality identified.                                     X-Rays:   Independently Interpreted Readings:   Chest X-Ray: No enlarged cardiac silhouette. No pulmonary edema. No consolidation.   Medications   ondansetron disintegrating tablet 4 mg (4 mg Oral Given 5/4/23 9764)   albuterol-ipratropium 2.5 mg-0.5 mg/3 mL nebulizer solution 3 mL (3 mLs Nebulization Given 5/4/23 3139)   LORazepam tablet 0.5 mg (0.5 mg Oral Given 5/4/23 2794)     Medical Decision Making:   History:   Old Medical Records: I decided to obtain old medical records.  Independently Interpreted Test(s):   I have ordered and independently interpreted X-rays - see prior notes.  I have ordered and independently interpreted EKG Reading(s) - see prior notes  Clinical Tests:   Lab Tests: Ordered and Reviewed  Radiological Study: Ordered and Reviewed  Medical Tests: Ordered and Reviewed        Scribe Attestation:   Scribe #1: I performed the above scribed service and the documentation accurately describes the services I performed. I attest to the accuracy of the note.    Attending Attestation:             Attending ED Notes:   Emergent evaluation a 32-year-old female with complaint of migratory paresthesias and also intermittent shortness of breath.  Patient is afebrile, nontoxic-appearing stable vital signs.  Heart rate is 70.  Patient is neurovascularly intact without focal neurologic deficits. Cranial nerves II-XII intact. Strength 5/5 throughout. Sensation intact to light touch throughout. Good finger to nose task ability. Negative pronator drift. Two point discrimination is intact throughout. Reflexes 2+ throughout. No papilledema.  No meningeal signs. PERRLA. EOMI.  No acute findings on urinary analysis.  No acute findings on chest x-ray.  EKG reveals sinus rhythm without any acute ST or T-wave changes.  The patient is extensively counseled on their diagnosis and treatment including all diagnostic, laboratory and physical exam findings.  The patient is discharged good condition  and directed follow-up with their PCP and neurology in the next 24-48 hours.    Physician Attestation for Scribe: I, Tarik Szymanski, reviewed documentation as scribed in my presence, which is both accurate and complete.                     Clinical Impression:   Final diagnoses:  [R06.02] Shortness of breath (Primary)  [R20.2] Paresthesias        ED Disposition Condition    Discharge Good          ED Prescriptions       Medication Sig Dispense Start Date End Date Auth. Provider    albuterol (PROVENTIL/VENTOLIN HFA) 90 mcg/actuation inhaler Inhale 1-2 puffs into the lungs every 4 (four) hours as needed for Wheezing or Shortness of Breath. Rescue 6.7 g 2023 5/3/2024 Tarik Szymanski MD    LORazepam (ATIVAN) 0.5 MG tablet () Take 1 tablet (0.5 mg total) by mouth every 6 (six) hours as needed for Anxiety. 10 tablet 2023 6/3/2023 Tarik Szymanski MD          Follow-up Information       Follow up With Specialties Details Why Contact Info    [facility] Emergency Dept Emergency Medicine  If symptoms worsen [address]  [facility] [id number] [phone]             Tarik Szymanski MD  [phone]

## 2023-05-05 NOTE — ED TRIAGE NOTES
Pt report sob with chest pressure that started at 9 pm. Pt report leg weakness as well and reports an history of anxiety but denies n/v/d at this time.

## 2023-05-11 ENCOUNTER — HOSPITAL ENCOUNTER (EMERGENCY)
Facility: OTHER | Age: 33
Discharge: HOME | End: 2023-05-11
Attending: EMERGENCY MEDICINE
Payer: MEDICAID

## 2023-05-11 VITALS
TEMPERATURE: 99 F | OXYGEN SATURATION: 100 % | HEART RATE: 74 BPM | RESPIRATION RATE: 18 BRPM | BODY MASS INDEX: 27.32 KG/M2 | HEIGHT: 66 IN | DIASTOLIC BLOOD PRESSURE: 85 MMHG | WEIGHT: 170 LBS | SYSTOLIC BLOOD PRESSURE: 125 MMHG

## 2023-05-11 DIAGNOSIS — F41.9 ANXIETY: ICD-10-CM

## 2023-05-11 DIAGNOSIS — R25.1 OCCASIONAL TREMORS: Primary | ICD-10-CM

## 2023-05-11 LAB
B-HCG UR QL: NEGATIVE
CTP QC/QA: YES

## 2023-05-11 PROCEDURE — 81025 URINE PREGNANCY TEST: CPT | Performed by: EMERGENCY MEDICINE

## 2023-05-11 PROCEDURE — 96372 THER/PROPH/DIAG INJ SC/IM: CPT | Performed by: EMERGENCY MEDICINE

## 2023-05-11 PROCEDURE — 99284 EMERGENCY DEPT VISIT MOD MDM: CPT

## 2023-05-11 PROCEDURE — 63600175 PHARM REV CODE 636 W HCPCS: Performed by: EMERGENCY MEDICINE

## 2023-05-11 RX ORDER — KETOROLAC TROMETHAMINE 30 MG/ML
15 INJECTION, SOLUTION INTRAMUSCULAR; INTRAVENOUS
Status: COMPLETED | OUTPATIENT
Start: 2023-05-11 | End: 2023-05-11

## 2023-05-11 RX ADMIN — KETOROLAC TROMETHAMINE 15 MG: 30 INJECTION, SOLUTION INTRAMUSCULAR; INTRAVENOUS at 10:05

## 2023-05-11 NOTE — ED TRIAGE NOTES
"Patient states she has been having intermittent episodes of tremors since December. States she has been seen multiple times for same complaint and has appt next week with neurology. This morning, she reports "shaking inside" which resolved after about 1 hr. Admits that it may be due to anxiety but didn't want to take meds until she knew there was nothing else wrong.   "

## 2023-05-11 NOTE — FIRST PROVIDER EVALUATION
Emergency Department TeleTriage Encounter Note      CHIEF COMPLAINT    Chief Complaint   Patient presents with    Tremors     C/o tremors and left neck pain 5/10 that began this am. -trauma/injury. Denies drug/ETOH use. Stated scheduled appointment with Neurology this Monday for right 1st digit numbness. No tremors noted. VSS       VITAL SIGNS   Initial Vitals [05/11/23 1012]   BP Pulse Resp Temp SpO2   125/85 74 18 98.8 °F (37.1 °C) 100 %      MAP       --            ALLERGIES    Review of patient's allergies indicates:  No Known Allergies    PROVIDER TRIAGE NOTE  Patient presents with complaint of left neck pain feeling like her muscles were twitching that started about two hours ago and rseolved.       Phy:   Constitutional: well nourished, well developed, appearing stated age, NAD   HEENT: NCAT, symmetrical lids, No obvious facial deformity.  Normal phonation. Normal Conjunctiva   Neck: NAROM   Respiratory: Normal effort.  No obvious use of accessory muscles   Musculoskeletal: Moved upper extremities well   Neuro: Alert, answers questions appropriately    Psych: appropriate mood and affect      Initial orders will be placed and care will be transferred to an alternate provider when patient is roomed for a full evaluation. Any additional orders and the final disposition will be determined by that provider.        ORDERS  Labs Reviewed - No data to display    ED Orders (720h ago, onward)      None              Virtual Visit Note: The provider triage portion of this emergency department evaluation and documentation was performed via [employer], a HIPAA-compliant telemedicine application, in concert with a tele-presenter in the room. A face to face patient evaluation with one of my colleagues will occur once the patient is placed in an emergency department room.      DISCLAIMER: This note was prepared with [employer] voice recognition transcription software. Garbled syntax, mangled pronouns, and other bizarre  constructions may be attributed to that software system.

## 2023-05-13 ENCOUNTER — HOSPITAL ENCOUNTER (EMERGENCY)
Facility: OTHER | Age: 33
Discharge: HOME | End: 2023-05-13
Attending: EMERGENCY MEDICINE
Payer: MEDICAID

## 2023-05-13 VITALS
WEIGHT: 173 LBS | BODY MASS INDEX: 27.8 KG/M2 | OXYGEN SATURATION: 99 % | TEMPERATURE: 99 F | HEART RATE: 75 BPM | HEIGHT: 66 IN | DIASTOLIC BLOOD PRESSURE: 85 MMHG | RESPIRATION RATE: 18 BRPM | SYSTOLIC BLOOD PRESSURE: 138 MMHG

## 2023-05-13 DIAGNOSIS — M79.604 BILATERAL LOWER EXTREMITY PAIN: Primary | ICD-10-CM

## 2023-05-13 DIAGNOSIS — M79.606 LEG PAIN: ICD-10-CM

## 2023-05-13 DIAGNOSIS — M79.605 BILATERAL LOWER EXTREMITY PAIN: Primary | ICD-10-CM

## 2023-05-13 LAB
B-HCG UR QL: NEGATIVE
CTP QC/QA: YES
HCV AB SERPL QL IA: NEGATIVE
HIV 1+2 AB+HIV1 P24 AG SERPL QL IA: NEGATIVE

## 2023-05-13 PROCEDURE — 87389 HIV-1 AG W/HIV-1&-2 AB AG IA: CPT | Performed by: EMERGENCY MEDICINE

## 2023-05-13 PROCEDURE — 81025 URINE PREGNANCY TEST: CPT | Performed by: EMERGENCY MEDICINE

## 2023-05-13 PROCEDURE — 99284 EMERGENCY DEPT VISIT MOD MDM: CPT | Mod: 25

## 2023-05-13 PROCEDURE — 86803 HEPATITIS C AB TEST: CPT | Performed by: EMERGENCY MEDICINE

## 2023-05-13 RX ORDER — IBUPROFEN 800 MG/1
800 TABLET ORAL EVERY 6 HOURS PRN
Qty: 20 TABLET | Refills: 0 | Status: SHIPPED | OUTPATIENT
Start: 2023-05-13

## 2023-05-13 NOTE — FIRST PROVIDER EVALUATION
Emergency Department TeleTriage Encounter Note      CHIEF COMPLAINT    Chief Complaint   Patient presents with    Leg Pain     C/o bilateral leg pain 8/10 x 1 wk. -trauma/injury. Stated stands on feet for 10 hrs/day. -SOB -CP. VSS       VITAL SIGNS   Initial Vitals [05/13/23 1349]   BP Pulse Resp Temp SpO2   131/82 73 18 98.4 °F (36.9 °C) 100 %      MAP       --            ALLERGIES    Review of patient's allergies indicates:  No Known Allergies    PROVIDER TRIAGE NOTE  32 year old female presents to the ER with complaints of bilateral calf pain x 1 week. Reports noticing pain to begin 1 wek ago. Sometimes left greater than right. No injury. Some swelling to both lower legs noticed. No redness or warmth. No known hx of DVT or coagulopathies in the past.     AAOx3, normal vitals, appears in no acute distress, normal coloration of skin.       ORDERS  Labs Reviewed - No data to display    ED Orders (720h ago, onward)      None              Virtual Visit Note: The provider triage portion of this emergency department evaluation and documentation was performed via [employer], a HIPAA-compliant telemedicine application, in concert with a tele-presenter in the room. A face to face patient evaluation with one of my colleagues will occur once the patient is placed in an emergency department room.      DISCLAIMER: This note was prepared with [facility] voice recognition transcription software. Garbled syntax, mangled pronouns, and other bizarre constructions may be attributed to that software system.

## 2023-05-13 NOTE — ED PROVIDER NOTES
"SCRIBE #1 NOTE: I, Ruby Ann, am scribing for, and in the presence of,  Daniel Rodriguez DO.       Source of History:  The patient.    Chief complaint:  Leg Pain (C/o bilateral leg pain 8/10 x 1 wk. -trauma/injury. Stated stands on feet for 10 hrs/day. -SOB -CP. VSS)      HPI:  Ken Bills is a 32 y.o. female presenting with intermittent bilateral leg pain that began five days ago. The patient denies fall or injury but notes that she stands on her feet for long hours at work. She states that she was seen in this ED two days ago for numbness and has a neurology appointment scheduled for three days from today.    This is the extent to the patients complaints today here in the emergency department.    ROS:   See HPI.    Review of patient's allergies indicates:  No Known Allergies    PMH:  As per HPI and below:  Past Medical History:   Diagnosis Date    Allergic      Past Surgical History:   Procedure Laterality Date     SECTION N/A 2018    Procedure:  SECTION;  Surgeon: Jesi Iqbal MD;  Location: [facility] L&D;  Service: OB/GYN;  Laterality: N/A;    NO PAST SURGERIES         Social History     Tobacco Use    Smoking status: Never    Smokeless tobacco: Never   Substance Use Topics    Alcohol use: Not Currently     Comment: rarely    Drug use: No       Physical Exam:    /72   Pulse 62   Temp 98.4 °F (36.9 °C) (Oral)   Resp 18   Ht 5' 6" (1.676 m)   Wt 78.5 kg (173 lb)   LMP  (LMP Unknown)   SpO2 100%   BMI 27.92 kg/m²   Nursing note and vital signs reviewed.  Constitutional: No acute distress.  Nontoxic  Head:  Normocephalic atraumatic  Musculoskeletal: Good range of motion of all other joints.  No bony tenderness in the extremities.  No deformities.  No soft tissue tenderness.   Skin: No rashes seen.      Review medical records  2023 seen here in the emergency department for occasional tremors as well as complaint of bilateral lower extremity pain.  It is " noted that she is had intermittent tremors since December 2022.  She has an appointment with Neurology scheduled on 05/15, which is in 2 days.   She was given a Toradol shot and reassured.  No further intervention was done as the patient was well-appearing and there was no serious pathology suspected.    Also on review medical records patient has multiple emergency department visits for multitude of complaints including previous a diagnosis of bilateral lower extremity pain and back pain.  She was here just a little over a month ago on 04/03/2003 for COVID concerns in which she tested positive for COVID.  She had blood work done at this time showing no abnormalities on her metabolic panel and no anemia.    MDM/ Differential Dx:   32-year-old female presents with bilateral lower extremity pain.  Had COVID back in April.  It is intermittent.  Based on my history as well as physical I suspect most likely musculoskeletal based on her activity level.  Ultrasound was initiated by the initial provider due to recent history of COVID which I feel is reasonable to rule out DVT.  I do not feel any further blood work is indicated.  I do not suspect significant electrolyte abnormality.  She is been seen in the emergency department in the past for bilateral leg pain including up to 2 days ago.  She is a follow-up appointment with Neurology as an outpatient in 2 days for upper extremity paresthesias.  She is in no distress at this time.  There is no evidence of pitting edema to suggest a cardiac issue.      ED Course as of 05/13/23 1539   Sat May 13, 2023   1422 Pulse: 73 [SM]   1422 Temp: 98.4 °F (36.9 °C) [SM]   1422 SpO2: 100 % [SM]   1422 Preg Test, Ur: Negative [SM]   1434 Preg Test, Ur: Negative [SM]   1535 US Lower Extremity Veins Bilateral  No evidence of DVTs in the ultrasound. [SM]   1535 No further workup is indicated in the emergency department today.  I updated pt regarding results and I counseled pt regarding  supportive care measures.  Diagnosis and treatment plan explained to patient. I have answered all questions and the patient is satisfied with the plan of care. Patient discharged home in stable condition.  [SM]      ED Course User Index  [SM] Daniel Rodriguez DO              Scribe Attestation:   Scribe #1: I performed the above scribed service and the documentation accurately describes the services I performed. I attest to the accuracy of the note.    Physician Attestation for Scribe: I, Dr Daniel Rodriguez, reviewed documentation as scribed in my presence, which is both accurate and complete.    Diagnostic Impression:    1. Bilateral lower extremity pain    2. Leg pain         ED Disposition Condition    Discharge Stable            ED Prescriptions       Medication Sig Dispense Start Date End Date Auth. Provider    ibuprofen (ADVIL,MOTRIN) 800 MG tablet Take 1 tablet (800 mg total) by mouth every 6 (six) hours as needed for Pain. 20 tablet 5/13/2023 -- Daniel Rodriguez DO          Follow-up Information       Follow up With Specialties Details Why Contact Info    Neurology  In 2 days At your scheduled appointment              Daniel Rodriguez DO  05/13/23 4913

## 2023-05-31 ENCOUNTER — HOSPITAL ENCOUNTER (OUTPATIENT)
Dept: CARDIOLOGY | Facility: OTHER | Age: 33
Discharge: HOME | End: 2023-05-31
Attending: INTERNAL MEDICINE
Payer: MEDICAID

## 2023-05-31 VITALS
HEART RATE: 61 BPM | BODY MASS INDEX: 27 KG/M2 | HEIGHT: 67 IN | SYSTOLIC BLOOD PRESSURE: 146 MMHG | DIASTOLIC BLOOD PRESSURE: 76 MMHG | WEIGHT: 172 LBS

## 2023-05-31 DIAGNOSIS — R07.2 PRECORDIAL PAIN: ICD-10-CM

## 2023-05-31 LAB
BSA FOR ECHO PROCEDURE: 1.92 M2
CV ECHO LV RWT: 0.27 CM
CV STRESS BASE HR: 61 BPM
DIASTOLIC BLOOD PRESSURE: 76 MMHG
ECHO LV POSTERIOR WALL: 0.66 CM (ref 0.6–1.1)
EJECTION FRACTION: 65 %
FRACTIONAL SHORTENING: 40 % (ref 28–44)
INTERVENTRICULAR SEPTUM: 0.73 CM (ref 0.6–1.1)
LEFT INTERNAL DIMENSION IN SYSTOLE: 2.98 CM (ref 2.1–4)
LEFT VENTRICLE DIASTOLIC VOLUME INDEX: 60.59 ML/M2
LEFT VENTRICLE DIASTOLIC VOLUME: 115.12 ML
LEFT VENTRICLE MASS INDEX: 59 G/M2
LEFT VENTRICLE SYSTOLIC VOLUME INDEX: 18 ML/M2
LEFT VENTRICLE SYSTOLIC VOLUME: 34.29 ML
LEFT VENTRICULAR INTERNAL DIMENSION IN DIASTOLE: 4.94 CM (ref 3.5–6)
LEFT VENTRICULAR MASS: 111.32 G
OHS CV CPX 1 MINUTE RECOVERY HEART RATE: 107 BPM
OHS CV CPX 85 PERCENT MAX PREDICTED HEART RATE MALE: 151
OHS CV CPX ESTIMATED METS: 13
OHS CV CPX MAX PREDICTED HEART RATE: 178
OHS CV CPX PATIENT IS FEMALE: 1
OHS CV CPX PATIENT IS MALE: 0
OHS CV CPX PEAK DIASTOLIC BLOOD PRESSURE: 58 MMHG
OHS CV CPX PEAK HEAR RATE: 179 BPM
OHS CV CPX PEAK RATE PRESSURE PRODUCT: NORMAL
OHS CV CPX PEAK SYSTOLIC BLOOD PRESSURE: 175 MMHG
OHS CV CPX PERCENT MAX PREDICTED HEART RATE ACHIEVED: 101
OHS CV CPX RATE PRESSURE PRODUCT PRESENTING: 8906
STRESS ECHO POST EXERCISE DUR MIN: 10 MINUTES
STRESS ECHO POST EXERCISE DUR SEC: 30 SECONDS
SYSTOLIC BLOOD PRESSURE: 146 MMHG

## 2023-05-31 PROCEDURE — 93351 STRESS TTE COMPLETE: CPT | Mod: 26,,, | Performed by: INTERNAL MEDICINE

## 2023-05-31 PROCEDURE — 93351 STRESS ECHO (CUPID ONLY): ICD-10-PCS | Mod: 26,,, | Performed by: INTERNAL MEDICINE

## 2023-05-31 PROCEDURE — 93351 STRESS TTE COMPLETE: CPT

## 2023-06-23 ENCOUNTER — HOSPITAL ENCOUNTER (EMERGENCY)
Facility: OTHER | Age: 33
Discharge: HOME | End: 2023-06-23
Attending: EMERGENCY MEDICINE
Payer: MEDICAID

## 2023-06-23 VITALS
BODY MASS INDEX: 27.8 KG/M2 | HEART RATE: 84 BPM | DIASTOLIC BLOOD PRESSURE: 98 MMHG | SYSTOLIC BLOOD PRESSURE: 121 MMHG | TEMPERATURE: 98 F | HEIGHT: 66 IN | WEIGHT: 173 LBS | OXYGEN SATURATION: 99 % | RESPIRATION RATE: 18 BRPM

## 2023-06-23 DIAGNOSIS — M54.12 CERVICAL RADICULOPATHY: Primary | ICD-10-CM

## 2023-06-23 LAB
B-HCG UR QL: NEGATIVE
CTP QC/QA: YES

## 2023-06-23 PROCEDURE — 81025 URINE PREGNANCY TEST: CPT | Performed by: EMERGENCY MEDICINE

## 2023-06-23 PROCEDURE — 99284 EMERGENCY DEPT VISIT MOD MDM: CPT

## 2023-06-23 PROCEDURE — 63600175 PHARM REV CODE 636 W HCPCS: Performed by: EMERGENCY MEDICINE

## 2023-06-23 PROCEDURE — 96372 THER/PROPH/DIAG INJ SC/IM: CPT | Performed by: EMERGENCY MEDICINE

## 2023-06-23 RX ORDER — KETOROLAC TROMETHAMINE 30 MG/ML
10 INJECTION, SOLUTION INTRAMUSCULAR; INTRAVENOUS
Status: COMPLETED | OUTPATIENT
Start: 2023-06-23 | End: 2023-06-23

## 2023-06-23 RX ADMIN — KETOROLAC TROMETHAMINE 10 MG: 30 INJECTION, SOLUTION INTRAMUSCULAR; INTRAVENOUS at 10:06

## 2023-06-24 NOTE — ED PROVIDER NOTES
SCRIBE #1 NOTE: I, Vik Huerta, am scribing for, and in the presence of,  Vielka Nance MD. I have scribed the entire note.       Source of History:  Patient    Chief complaint:  Extremity Weakness (Pt presents to the ER with complaints of numbness and weakness in the left arm radiating up into the left shoulder and upper back x 3-4 hours ago. Pt states she has been seen for these same complaints several times in the past; states she has an appt with a neurologist on Wednesday./)      HPI:  Ken Bills is a 33 y.o. female presenting with ongoing migratory pain currently to her left arm. Patient reports the pain has been in her chest, left shoulder, upper back, either leg, neck, and back of her head. She does endorse recent lifting. Patient denies any appetite changes, leg swelling, abdominal pain, nausea, vomiting, diarrhea, or numbness. No history of neck or arm injuries.     This is the extent to the patients complaints today here in the emergency department.    ROS: As per HPI and below:  General: No fever.  No chills.  Eyes: No visual changes.  ENT: No sore throat. No ear pain  Head: No headache.    Respiratory: No shortness of breath.  Cardiovascular: No chest pain.  Abdomen: No abdominal pain.  No nausea or vomiting.  Genito-Urinary: No abnormal urination.  Neurologic: No focal weakness.  No numbness.  MSK: no back pain. Notes myalgias.  Integument: No rashes or lesions.  Hematologic: No easy bruising.  Endocrine: No excessive thirst or urination.    Review of patient's allergies indicates:  No Known Allergies    PMH:  As per HPI and below:  Past Medical History:   Diagnosis Date    Allergic      Past Surgical History:   Procedure Laterality Date     SECTION N/A 2018    Procedure:  SECTION;  Surgeon: Jesi Iqbal MD;  Location: [facility];  Service: OB/GYN;  Laterality: N/A;    NO PAST SURGERIES         Social History     Tobacco Use    Smoking status: Never     "Smokeless tobacco: Never   Substance Use Topics    Alcohol use: Not Currently     Comment: rarely    Drug use: No       Physical Exam:    BP (!) 121/98 (BP Location: Left arm, Patient Position: Sitting)   Pulse 84   Temp 98.4 °F (36.9 °C) (Oral)   Resp 18   Ht 5' 6" (1.676 m)   Wt 78.5 kg (173 lb)   SpO2 99%   BMI 27.92 kg/m²   Nursing note and vital signs reviewed.    Appearance: Appears anxious. Speaking rapidly.  Eyes: No conjunctival injection.  Neck: No deformity.   ENT: Oropharynx clear.  No stridor.   Chest/ Respiratory: Clear to auscultation bilaterally.  Good air movement.  No wheezes.  No rhonchi. No rales. No accessory muscle use.  Cardiovascular: Regular rate and rhythm.  No murmurs. No gallops. No rubs.  Musculoskeletal: No tenderness or deformity of the left upper extremity. Full active and passive range of motion without discomfort. No focal sensory deficit.  Skin: No rashes seen.  Good turgor.  No abrasions.  No ecchymoses.  Neurologic: Motor intact.  Sensation intact.  Cerebellar intact.  Cranial nerves intact.  Mental Status:  Alert and oriented x 3.  Appropriate, conversant.      I decided to obtain the patient's medical records.  Summary of Medical Records:    On external records, patient underwent a stress echo with cardiology on May 31st which was normal with an EF of 65%. At the time she was complaining of mild substernal chest pains persisting for one month. Patient was seen in the ED three times in the month leading up to this for various complaints with reassuring chest x-ray, EKG, urinalysis, and lower extremity ultrasound. The month prior she had a benign TSH, B12, and inflammatory markers.      Initial Impression  33 y.o. female with frequent visits for various complaints. Currently here with left arm discomfort and prior left neck discomfort. Patient has been working and lifting. Plan IM Toradol and follow-up as scheduled with neurology next week. Suspect large anxiety component " at play with her symptoms.     Differential Dx:  Cervical spine strain, prolapsed disc, radicular pain, muscle spasm, trapezius strain, referred pain from shoulder, abdomen, thorax or oropharynx      MDM:      Patient improved with treatment in the emergency department and comfortable going home. Discussed reasons to return and importance of followup.  Patient understands that the emergency visit today is primarily to address immediate concerns and to rule out emergent cause of symptoms and that they may require further workup and evaluation as an outpatient. All questions addressed and patient given discharge instructions and followup information.              Scribe Attestation:   Scribe #1: I performed the above scribed service and the documentation accurately describes the services I performed. I attest to the accuracy of the note.  Physician Attestation for Scribe: I, Vielka Nance MD, reviewed documentation as scribed in my presence, which is both accurate and complete.      Diagnostic Impression:    1. Cervical radiculopathy         ED Disposition Condition    Discharge Stable            ED Prescriptions    None       Follow-up Information    None          Vielka Nance MD  06/29/23 0027

## 2023-06-24 NOTE — DISCHARGE INSTRUCTIONS
Followup with neurology as scheduled next week.  Return to the hospital for new or worsening symptoms or pain.

## 2023-07-18 ENCOUNTER — PATIENT OUTREACH (OUTPATIENT)
Dept: EMERGENCY MEDICINE | Facility: OTHER | Age: 33
End: 2023-07-18
Payer: MEDICAID

## 2023-07-18 NOTE — PROGRESS NOTES
Spoke to patient today and she stated she was doing fine and declined any scheduling assistance needs. Instructed pt to call with any future needs/concerns and she verbalized understanding.

## 2023-08-12 NOTE — PLAN OF CARE
Problem: Patient Care Overview  Goal: Plan of Care Review  Outcome: Ongoing (interventions implemented as appropriate)  Pt remained stable throughout shift. Pt responds well to infant cues and tends to infant needs. Will continue to monitor pt vitals and pain control. Will proceed with routine postpartal POC.           Alert-The patient is alert, awake and responds to voice. The patient is oriented to time, place, and person. The triage nurse is able to obtain subjective information.

## 2023-08-24 ENCOUNTER — HOSPITAL ENCOUNTER (EMERGENCY)
Facility: OTHER | Age: 33
Discharge: HOME | End: 2023-08-24
Attending: EMERGENCY MEDICINE
Payer: MEDICAID

## 2023-08-24 VITALS
HEIGHT: 66 IN | RESPIRATION RATE: 18 BRPM | SYSTOLIC BLOOD PRESSURE: 134 MMHG | HEART RATE: 82 BPM | DIASTOLIC BLOOD PRESSURE: 86 MMHG | OXYGEN SATURATION: 100 % | WEIGHT: 172 LBS | TEMPERATURE: 98 F | BODY MASS INDEX: 27.64 KG/M2

## 2023-08-24 DIAGNOSIS — R07.89 CHEST DISCOMFORT: ICD-10-CM

## 2023-08-24 DIAGNOSIS — R07.9 CHEST PAIN: ICD-10-CM

## 2023-08-24 DIAGNOSIS — M94.0 COSTOCHONDRITIS: Primary | ICD-10-CM

## 2023-08-24 LAB
ALBUMIN SERPL BCP-MCNC: 3.9 G/DL (ref 3.5–5.2)
ALP SERPL-CCNC: 69 U/L (ref 55–135)
ALT SERPL W/O P-5'-P-CCNC: 19 U/L (ref 10–44)
ANION GAP SERPL CALC-SCNC: 10 MMOL/L (ref 8–16)
AST SERPL-CCNC: 26 U/L (ref 10–40)
B-HCG UR QL: NEGATIVE
BASOPHILS # BLD AUTO: 0.05 K/UL (ref 0–0.2)
BASOPHILS NFR BLD: 0.7 % (ref 0–1.9)
BILIRUB SERPL-MCNC: 0.5 MG/DL (ref 0.1–1)
BILIRUB UR QL STRIP: NEGATIVE
BUN SERPL-MCNC: 10 MG/DL (ref 6–20)
CALCIUM SERPL-MCNC: 8.9 MG/DL (ref 8.7–10.5)
CHLORIDE SERPL-SCNC: 109 MMOL/L (ref 95–110)
CLARITY UR: CLEAR
CO2 SERPL-SCNC: 18 MMOL/L (ref 23–29)
COLOR UR: YELLOW
CREAT SERPL-MCNC: 0.9 MG/DL (ref 0.5–1.4)
CTP QC/QA: YES
DIFFERENTIAL METHOD: NORMAL
EOSINOPHIL # BLD AUTO: 0.1 K/UL (ref 0–0.5)
EOSINOPHIL NFR BLD: 1.7 % (ref 0–8)
ERYTHROCYTE [DISTWIDTH] IN BLOOD BY AUTOMATED COUNT: 13.2 % (ref 11.5–14.5)
EST. GFR  (NO RACE VARIABLE): >60 ML/MIN/1.73 M^2
GLUCOSE SERPL-MCNC: 81 MG/DL (ref 70–110)
GLUCOSE UR QL STRIP: NEGATIVE
HCT VFR BLD AUTO: 42.1 % (ref 37–48.5)
HGB BLD-MCNC: 13.6 G/DL (ref 12–16)
HGB UR QL STRIP: NEGATIVE
IMM GRANULOCYTES # BLD AUTO: 0.02 K/UL (ref 0–0.04)
IMM GRANULOCYTES NFR BLD AUTO: 0.3 % (ref 0–0.5)
KETONES UR QL STRIP: NEGATIVE
LEUKOCYTE ESTERASE UR QL STRIP: NEGATIVE
LYMPHOCYTES # BLD AUTO: 2.2 K/UL (ref 1–4.8)
LYMPHOCYTES NFR BLD: 31.5 % (ref 18–48)
MCH RBC QN AUTO: 28.9 PG (ref 27–31)
MCHC RBC AUTO-ENTMCNC: 32.3 G/DL (ref 32–36)
MCV RBC AUTO: 90 FL (ref 82–98)
MONOCYTES # BLD AUTO: 0.5 K/UL (ref 0.3–1)
MONOCYTES NFR BLD: 7.1 % (ref 4–15)
NEUTROPHILS # BLD AUTO: 4.1 K/UL (ref 1.8–7.7)
NEUTROPHILS NFR BLD: 58.7 % (ref 38–73)
NITRITE UR QL STRIP: NEGATIVE
NRBC BLD-RTO: 0 /100 WBC
PH UR STRIP: 6 [PH] (ref 5–8)
PLATELET # BLD AUTO: 251 K/UL (ref 150–450)
PMV BLD AUTO: 9.2 FL (ref 9.2–12.9)
POC MOLECULAR INFLUENZA A AGN: NEGATIVE
POC MOLECULAR INFLUENZA B AGN: NEGATIVE
POCT GLUCOSE: 78 MG/DL (ref 70–110)
POTASSIUM SERPL-SCNC: 3.8 MMOL/L (ref 3.5–5.1)
POTASSIUM SERPL-SCNC: 5.7 MMOL/L (ref 3.5–5.1)
PROT SERPL-MCNC: 7.9 G/DL (ref 6–8.4)
PROT UR QL STRIP: NEGATIVE
RBC # BLD AUTO: 4.7 M/UL (ref 4–5.4)
SARS-COV-2 RDRP RESP QL NAA+PROBE: NEGATIVE
SODIUM SERPL-SCNC: 137 MMOL/L (ref 136–145)
SP GR UR STRIP: 1.02 (ref 1–1.03)
TROPONIN I SERPL DL<=0.01 NG/ML-MCNC: <0.006 NG/ML (ref 0–0.03)
URN SPEC COLLECT METH UR: NORMAL
UROBILINOGEN UR STRIP-ACNC: NEGATIVE EU/DL
WBC # BLD AUTO: 7.05 K/UL (ref 3.9–12.7)

## 2023-08-24 PROCEDURE — 25000003 PHARM REV CODE 250: Performed by: NURSE PRACTITIONER

## 2023-08-24 PROCEDURE — 84484 ASSAY OF TROPONIN QUANT: CPT | Performed by: NURSE PRACTITIONER

## 2023-08-24 PROCEDURE — 84132 ASSAY OF SERUM POTASSIUM: CPT | Performed by: NURSE PRACTITIONER

## 2023-08-24 PROCEDURE — 82962 GLUCOSE BLOOD TEST: CPT

## 2023-08-24 PROCEDURE — 85025 COMPLETE CBC W/AUTO DIFF WBC: CPT | Performed by: NURSE PRACTITIONER

## 2023-08-24 PROCEDURE — 80053 COMPREHEN METABOLIC PANEL: CPT | Performed by: NURSE PRACTITIONER

## 2023-08-24 PROCEDURE — 81003 URINALYSIS AUTO W/O SCOPE: CPT | Performed by: NURSE PRACTITIONER

## 2023-08-24 PROCEDURE — 96374 THER/PROPH/DIAG INJ IV PUSH: CPT

## 2023-08-24 PROCEDURE — 63600175 PHARM REV CODE 636 W HCPCS: Performed by: NURSE PRACTITIONER

## 2023-08-24 PROCEDURE — 87635 SARS-COV-2 COVID-19 AMP PRB: CPT | Performed by: NURSE PRACTITIONER

## 2023-08-24 PROCEDURE — 99285 EMERGENCY DEPT VISIT HI MDM: CPT | Mod: 25

## 2023-08-24 PROCEDURE — 81025 URINE PREGNANCY TEST: CPT | Performed by: NURSE PRACTITIONER

## 2023-08-24 RX ORDER — ORPHENADRINE CITRATE 100 MG/1
100 TABLET, EXTENDED RELEASE ORAL
Status: COMPLETED | OUTPATIENT
Start: 2023-08-24 | End: 2023-08-24

## 2023-08-24 RX ORDER — NAPROXEN 375 MG/1
375 TABLET ORAL 2 TIMES DAILY WITH MEALS
Qty: 60 TABLET | Refills: 0 | Status: SHIPPED | OUTPATIENT
Start: 2023-08-24

## 2023-08-24 RX ORDER — KETOROLAC TROMETHAMINE 30 MG/ML
15 INJECTION, SOLUTION INTRAMUSCULAR; INTRAVENOUS
Status: COMPLETED | OUTPATIENT
Start: 2023-08-24 | End: 2023-08-24

## 2023-08-24 RX ORDER — METHOCARBAMOL 500 MG/1
1000 TABLET, FILM COATED ORAL 3 TIMES DAILY
Qty: 30 TABLET | Refills: 0 | Status: SHIPPED | OUTPATIENT
Start: 2023-08-24 | End: 2023-08-29

## 2023-08-24 RX ORDER — LIDOCAINE 50 MG/G
1 PATCH TOPICAL DAILY
Qty: 15 PATCH | Refills: 0 | Status: SHIPPED | OUTPATIENT
Start: 2023-08-24

## 2023-08-24 RX ADMIN — ORPHENADRINE CITRATE 100 MG: 100 TABLET, EXTENDED RELEASE ORAL at 05:08

## 2023-08-24 RX ADMIN — KETOROLAC TROMETHAMINE 15 MG: 30 INJECTION, SOLUTION INTRAMUSCULAR; INTRAVENOUS at 04:08

## 2023-08-24 NOTE — ED TRIAGE NOTES
Intermittent pain in left anterior chest for months - states pain usually relieved with Ibuprofen and massaging area. Today, pain associated with nausea and left arm numbness. States pain slightly worse with movement and deep breath. No recent illness. No fever, cough, cold symptoms. No distress noted.

## 2023-08-24 NOTE — FIRST PROVIDER EVALUATION
Emergency Department TeleTriage Encounter Note      CHIEF COMPLAINT    Chief Complaint   Patient presents with    Chest Pain     Pt reporting constant L sided chest pain that radiates to L arm. Pt denies SOB. Denies cardiac hx. Pt also endorsing weakness.        VITAL SIGNS   Initial Vitals [08/24/23 1418]   BP Pulse Resp Temp SpO2   134/86 82 18 98.2 °F (36.8 °C) 100 %      MAP       --            ALLERGIES    Review of patient's allergies indicates:  No Known Allergies    PROVIDER TRIAGE NOTE  Verbal consent for the teletriage evaluation was given by the patient at the start of the evaluation.  All efforts will be made to maintain patient's privacy during the evaluation.      This is a teletriage evaluation of a 33 y.o. female presenting to the ED with c/o CP on the left side, dizziness, body aches, and fatigue that started at approximately 11 am. Limited physical exam via telehealth: The patient is awake, alert, answering questions appropriately and is not in respiratory distress.  As the Teletriage provider, I performed an initial assessment and ordered appropriate labs and imaging studies, if any, to facilitate the patient's care once placed in the ED. Once a room is available, care and a full evaluation will be completed by an alternate ED provider.  Any additional orders and the final disposition will be determined by that provider.  All imaging and labs will not be followed-up by the Teletriage Team, including myself.          ORDERS  Labs Reviewed   URINALYSIS, REFLEX TO URINE CULTURE   SARS-COV-2 RDRP GENE   POCT INFLUENZA A/B MOLECULAR   POCT URINE PREGNANCY   POCT GLUCOSE MONITORING CONTINUOUS       ED Orders (720h ago, onward)      Start Ordered     Status Ordering Provider    08/24/23 1429 08/24/23 1428  X-Ray Chest PA And Lateral  1 time imaging         Ordered MARIA TERESA SIMMONS    08/24/23 1429 08/24/23 1428  POCT glucose  Once         Ordered MARIA TERESA SIMMONS    08/24/23 1428 08/24/23 1427  EKG 12-lead   Once         Completed by FRANCIA DUQUE on 8/24/2023 at  2:27 PM ADEBAYO BRIGHT    08/24/23 1428 08/24/23 1428  POCT COVID-19 Rapid Screening  Once         Ordered MARIA TERESA SIMMONS    08/24/23 1428 08/24/23 1428  POCT Influenza A/B Molecular  Once         Ordered MARIA TERESA SIMMONS    08/24/23 1428 08/24/23 1428  POCT urine pregnancy  Once         Ordered MARIA TERESA SIMMONS    08/24/23 1428 08/24/23 1428  Urinalysis, Reflex to Urine Culture Urine, Clean Catch  STAT         Ordered MARIA TERESA SIMMONS              Virtual Visit Note: The provider triage portion of this emergency department evaluation and documentation was performed via [employer], a HIPAA-compliant telemedicine application, in concert with a tele-presenter in the room. A face to face patient evaluation with one of my colleagues will occur once the patient is placed in an emergency department room.      DISCLAIMER: This note was prepared with [employer] voice recognition transcription software. Garbled syntax, mangled pronouns, and other bizarre constructions may be attributed to that software system.

## 2023-08-24 NOTE — ED PROVIDER NOTES
"Source of History:  Patient    Chief complaint:  Chest Pain (Pt reporting constant L sided chest pain that radiates to L arm. Pt denies SOB. Denies cardiac hx. Pt also endorsing weakness. )      HPI:  Ken Bills is a 33 y.o. female presenting with complaint of left anterior chest pain that began while at work.  Reports that it radiates to her left arm.  History of similar complaints and was told was muscle spasms.  She also reports feeling tired.  States he has been working a lot.  Denies any shortness of breath.  Denies any cough, congestion or any fever/chills    This is the extent to the patients complaints today here in the emergency department.    PMH:  As per HPI and below:  Past Medical History:   Diagnosis Date    Allergic      Past Surgical History:   Procedure Laterality Date     SECTION N/A 2018    Procedure:  SECTION;  Surgeon: Jesi Iqbal MD;  Location: [facility] [place], operated by [facility] L&D;  Service: OB/GYN;  Laterality: N/A;    NO PAST SURGERIES         Social History     Tobacco Use    Smoking status: Never    Smokeless tobacco: Never   Substance Use Topics    Alcohol use: Not Currently     Comment: rarely    Drug use: No       Review of patient's allergies indicates:  No Known Allergies    ROS: As per HPI and below:  Constitutional: No fever.  No chills.  Eyes: No visual changes.  ENT: No sore throat. No ear pain.  Cardiovascular:  Chest pain  Respiratory:  No shortness of breath  GI: No abdominal pain.  No nausea or vomiting.  Genitourinary: No abnormal urination.  Neurologic: No headache. No focal weakness.  No numbness.  MSK: no back pain.  Integument: No rashes or lesions.  Hematologic: No easy bruising.  Endocrine: No excessive thirst or urination.    Physical Exam:    /86 (BP Location: Left arm, Patient Position: Sitting)   Pulse 82   Temp 98.2 °F (36.8 °C) (Oral)   Resp 18   Ht 5' 6" (1.676 m)   Wt 78 kg (172 lb)   SpO2 100%   BMI 27.76 kg/m²   Vitals:    23 " "1418   BP: 134/86   Pulse: 82   Resp: 18   Temp: 98.2 °F (36.8 °C)   TempSrc: Oral   SpO2: 100%   Weight: 78 kg (172 lb)   Height: 5' 6" (1.676 m)       Nursing note and vital signs reviewed.  Constitutional: No acute distress.  Well-appearing, non-toxic.  Eyes: No conjunctival injection.  Extraocular muscles are intact.  ENT: Oropharynx clear.  Mucous membranes are pink and moist  Cardiovascular:  Regular rate  and rhythm no murmurs gallops or rubs.  Chest/ Respiratory:  Clear to auscultation bilaterally without wheezing rhonchi or rales.  Mild left upper chest wall tenderness which is reproducible to palpation and movement.  Abdomen: Soft.  Not distended.  Nontender.  No guarding.  No rebound. Non-peritoneal.  Musculoskeletal: Good range of motion all joints.  No deformities.  Neck supple.  No meningismus.  Skin: No rashes seen.  Good turgor.  No abrasions.  No ecchymoses.  Neuro: alert and oriented x3,  no focal neurological deficits.   is equal bilaterally  Psych: Appropriate, conversant    Labs that have been ordered have been independently reviewed and interpreted by myself.    Labs Reviewed   COMPREHENSIVE METABOLIC PANEL - Abnormal; Notable for the following components:       Result Value    Potassium 5.7 (*)     CO2 18 (*)     All other components within normal limits   URINALYSIS, REFLEX TO URINE CULTURE    Narrative:     Specimen Source->Urine   CBC W/ AUTO DIFFERENTIAL   TROPONIN I   POTASSIUM   SARS-COV-2 RDRP GENE   POCT INFLUENZA A/B MOLECULAR   POCT URINE PREGNANCY   POCT GLUCOSE   POCT GLUCOSE MONITORING CONTINUOUS       X-Ray Chest PA And Lateral   Final Result      No acute cardiopulmonary process identified.         Electronically signed by: Oskar Rucker MD   Date:    08/24/2023   Time:    17:01              Differential Diagnosis:  Differential Diagnosis includes, but is not limited to:  ACS/MI, PE, aortic dissection, pneumothorax, cardiac tamponade, pericarditis/myocarditis, pneumonia, " infection/abscess, lung mass, costochondritis/pleurisy, GERD, biliary disease, pancreatitis    MDM  33 y.o. female with left chest wall tenderness radiating to her arm.  Pain was reproducible with movement and palpation.  She has a history of cervical radiculopathy which have believe is likely the cause of the pain radiating.  Labs reassuring, no leukocytosis, initial troponin was negative.  Initial potassium was elevated however moderately hemolyzed.  Repeat was within normal limits.  She also had relief of symptoms while in the emergency department.  Likely costochondritis.  Discussed needs follow-up with primary care.  ED Course as of 08/24/23 1944   Thu Aug 24, 2023   1820 Potassium: 3.8 [AS]      ED Course User Index  [AS] Ragini Mendez FNP       Diagnostic Impression:    1. Costochondritis    2. Chest pain    3. Chest discomfort         ED Disposition Condition    Discharge Stable            ED Prescriptions       Medication Sig Dispense Start Date End Date Auth. Provider    naproxen (NAPROSYN) 375 MG tablet Take 1 tablet (375 mg total) by mouth 2 (two) times daily with meals. 60 tablet 8/24/2023 -- Ragini Mendez FNP    methocarbamoL (ROBAXIN) 500 MG Tab Take 2 tablets (1,000 mg total) by mouth 3 (three) times daily. for 5 days 30 tablet 8/24/2023 8/29/2023 Ragini Mendez FNP    LIDOcaine (LIDODERM) 5 % Place 1 patch onto the skin once daily. Remove & Discard patch within 12 hours or as directed by MD 15 patch 8/24/2023 -- Ragini Mendez FNP          Follow-up Information       Follow up With Specialties Details Why Contact Info    [facility] Emergency Medicine Go to  If symptoms worsen [address] [place] [facility] [facility] [id number] [phone]             Ragini Mendez FNP  08/24/23 1944

## 2023-09-19 ENCOUNTER — HOSPITAL ENCOUNTER (EMERGENCY)
Facility: OTHER | Age: 33
Discharge: HOME | End: 2023-09-19
Attending: EMERGENCY MEDICINE
Payer: MEDICAID

## 2023-09-19 VITALS
DIASTOLIC BLOOD PRESSURE: 85 MMHG | OXYGEN SATURATION: 98 % | RESPIRATION RATE: 18 BRPM | BODY MASS INDEX: 27.92 KG/M2 | HEART RATE: 96 BPM | TEMPERATURE: 98 F | WEIGHT: 173 LBS | SYSTOLIC BLOOD PRESSURE: 140 MMHG

## 2023-09-19 DIAGNOSIS — R51.9 ACUTE NONINTRACTABLE HEADACHE, UNSPECIFIED HEADACHE TYPE: Primary | ICD-10-CM

## 2023-09-19 DIAGNOSIS — F41.9 ANXIETY: ICD-10-CM

## 2023-09-19 LAB
ANION GAP SERPL CALC-SCNC: 9 MMOL/L (ref 8–16)
BASOPHILS # BLD AUTO: 0.05 K/UL (ref 0–0.2)
BASOPHILS NFR BLD: 0.7 % (ref 0–1.9)
BUN SERPL-MCNC: 13 MG/DL (ref 6–20)
CALCIUM SERPL-MCNC: 8.9 MG/DL (ref 8.7–10.5)
CHLORIDE SERPL-SCNC: 109 MMOL/L (ref 95–110)
CK SERPL-CCNC: 264 U/L (ref 20–180)
CO2 SERPL-SCNC: 23 MMOL/L (ref 23–29)
CREAT SERPL-MCNC: 0.9 MG/DL (ref 0.5–1.4)
DIFFERENTIAL METHOD: ABNORMAL
EOSINOPHIL # BLD AUTO: 0.3 K/UL (ref 0–0.5)
EOSINOPHIL NFR BLD: 4.6 % (ref 0–8)
ERYTHROCYTE [DISTWIDTH] IN BLOOD BY AUTOMATED COUNT: 12.7 % (ref 11.5–14.5)
EST. GFR  (NO RACE VARIABLE): >60 ML/MIN/1.73 M^2
GLUCOSE SERPL-MCNC: 77 MG/DL (ref 70–110)
HCT VFR BLD AUTO: 41.4 % (ref 37–48.5)
HGB BLD-MCNC: 13.5 G/DL (ref 12–16)
IMM GRANULOCYTES # BLD AUTO: 0.01 K/UL (ref 0–0.04)
IMM GRANULOCYTES NFR BLD AUTO: 0.1 % (ref 0–0.5)
LYMPHOCYTES # BLD AUTO: 2.5 K/UL (ref 1–4.8)
LYMPHOCYTES NFR BLD: 35.5 % (ref 18–48)
MCH RBC QN AUTO: 28.9 PG (ref 27–31)
MCHC RBC AUTO-ENTMCNC: 32.6 G/DL (ref 32–36)
MCV RBC AUTO: 89 FL (ref 82–98)
MONOCYTES # BLD AUTO: 0.7 K/UL (ref 0.3–1)
MONOCYTES NFR BLD: 10 % (ref 4–15)
NEUTROPHILS # BLD AUTO: 3.5 K/UL (ref 1.8–7.7)
NEUTROPHILS NFR BLD: 49.1 % (ref 38–73)
NRBC BLD-RTO: 0 /100 WBC
PLATELET # BLD AUTO: 269 K/UL (ref 150–450)
PMV BLD AUTO: 9.1 FL (ref 9.2–12.9)
POTASSIUM SERPL-SCNC: 4.3 MMOL/L (ref 3.5–5.1)
RBC # BLD AUTO: 4.67 M/UL (ref 4–5.4)
SODIUM SERPL-SCNC: 141 MMOL/L (ref 136–145)
WBC # BLD AUTO: 7.13 K/UL (ref 3.9–12.7)

## 2023-09-19 PROCEDURE — 80048 BASIC METABOLIC PNL TOTAL CA: CPT

## 2023-09-19 PROCEDURE — 85025 COMPLETE CBC W/AUTO DIFF WBC: CPT

## 2023-09-19 PROCEDURE — 25000003 PHARM REV CODE 250

## 2023-09-19 PROCEDURE — 96361 HYDRATE IV INFUSION ADD-ON: CPT

## 2023-09-19 PROCEDURE — 96375 TX/PRO/DX INJ NEW DRUG ADDON: CPT

## 2023-09-19 PROCEDURE — 63600175 PHARM REV CODE 636 W HCPCS

## 2023-09-19 PROCEDURE — 96374 THER/PROPH/DIAG INJ IV PUSH: CPT

## 2023-09-19 PROCEDURE — 82550 ASSAY OF CK (CPK): CPT

## 2023-09-19 PROCEDURE — 99284 EMERGENCY DEPT VISIT MOD MDM: CPT | Mod: 25

## 2023-09-19 RX ORDER — BUTALBITAL, ACETAMINOPHEN AND CAFFEINE 50; 325; 40 MG/1; MG/1; MG/1
1 TABLET ORAL EVERY 6 HOURS PRN
Qty: 28 TABLET | Refills: 0 | Status: SHIPPED | OUTPATIENT
Start: 2023-09-19 | End: 2023-09-23 | Stop reason: SDUPTHER

## 2023-09-19 RX ORDER — PROCHLORPERAZINE EDISYLATE 5 MG/ML
10 INJECTION INTRAMUSCULAR; INTRAVENOUS
Status: COMPLETED | OUTPATIENT
Start: 2023-09-19 | End: 2023-09-19

## 2023-09-19 RX ORDER — KETOROLAC TROMETHAMINE 30 MG/ML
15 INJECTION, SOLUTION INTRAMUSCULAR; INTRAVENOUS
Status: COMPLETED | OUTPATIENT
Start: 2023-09-19 | End: 2023-09-19

## 2023-09-19 RX ORDER — LORAZEPAM 1 MG/1
0.5 TABLET ORAL EVERY 6 HOURS PRN
Qty: 10 TABLET | Refills: 0 | Status: SHIPPED | OUTPATIENT
Start: 2023-09-19 | End: 2023-09-24

## 2023-09-19 RX ORDER — DIPHENHYDRAMINE HYDROCHLORIDE 50 MG/ML
25 INJECTION INTRAMUSCULAR; INTRAVENOUS
Status: COMPLETED | OUTPATIENT
Start: 2023-09-19 | End: 2023-09-19

## 2023-09-19 RX ADMIN — PROCHLORPERAZINE EDISYLATE 10 MG: 5 INJECTION INTRAMUSCULAR; INTRAVENOUS at 09:09

## 2023-09-19 RX ADMIN — DIPHENHYDRAMINE HYDROCHLORIDE 25 MG: 50 INJECTION, SOLUTION INTRAMUSCULAR; INTRAVENOUS at 10:09

## 2023-09-19 RX ADMIN — SODIUM CHLORIDE 500 ML: 9 INJECTION, SOLUTION INTRAVENOUS at 09:09

## 2023-09-19 RX ADMIN — SODIUM CHLORIDE 1000 ML: 9 INJECTION, SOLUTION INTRAVENOUS at 10:09

## 2023-09-19 RX ADMIN — KETOROLAC TROMETHAMINE 15 MG: 30 INJECTION, SOLUTION INTRAMUSCULAR; INTRAVENOUS at 09:09

## 2023-09-20 NOTE — ED PROVIDER NOTES
Encounter Date: 2023       History     Chief Complaint   Patient presents with    Headache     C/o of a headache over the past three days with light sensitivity      Ken Bills is a 33 y.o. female presenting to the emergency department for evaluation of persistent frontal headaches for the past 3 days. She describes the headaches as a pressure. She denies recent falls or head trauma. No relief with Tylenol or ibuprofen. She also reports blurry vision in both eyes and intermittent numbness in bilateral arms. She does not wear corrective lenses. She was evaluated by neurology two months ago and states she had a negative workup. She thinks she has been having anxiety attacks lately. Patient states she was told she has anxiety. She was started on Lexapro in the past but has not been taking the medication. She has not been evaluated by psychiatry recently. She denies fever, chills, cough, cold symptoms, SOB, chest pain, abdominal pain, nausea, vomiting, diarrhea, urinary symptoms, vaginal bleeding, or vaginal discharge. She denies use of caffeine.       The history is provided by the patient.     Review of patient's allergies indicates:  No Known Allergies  Past Medical History:   Diagnosis Date    Allergic      Past Surgical History:   Procedure Laterality Date     SECTION N/A 2018    Procedure:  SECTION;  Surgeon: Jesi Iqbal MD;  [facility]: [facility];  Service: OB/GYN;  Laterality: N/A;    NO PAST SURGERIES       Family History   Problem Relation Age of Onset    Breast cancer Neg Hx     Colon cancer Neg Hx     Ovarian cancer Neg Hx      Social History     Tobacco Use    Smoking status: Never    Smokeless tobacco: Never   Substance Use Topics    Alcohol use: Not Currently     Comment: rarely    Drug use: No     Review of Systems   Constitutional:  Negative for chills and fever.   HENT:  Negative for congestion, rhinorrhea and sore throat.    Eyes:  Positive for visual  disturbance (blurry vision).   Respiratory:  Negative for cough and shortness of breath.    Cardiovascular:  Negative for chest pain.   Gastrointestinal:  Negative for abdominal pain, diarrhea, nausea and vomiting.   Genitourinary:  Negative for dysuria, frequency and urgency.   Musculoskeletal:  Negative for back pain.   Skin:  Negative for rash.   Neurological:  Positive for numbness and headaches. Negative for dizziness.   Psychiatric/Behavioral:  Negative for confusion. The patient is nervous/anxious.        Physical Exam     Initial Vitals [09/19/23 2018]   BP Pulse Resp Temp SpO2   (!) 141/81 76 17 98.2 °F (36.8 °C) 100 %      MAP       --         Physical Exam    Nursing note and vitals reviewed.  Constitutional: She appears well-developed and well-nourished. No distress.   HENT:   Head: Normocephalic and atraumatic.   Right Ear: Tympanic membrane, external ear and ear canal normal.   Left Ear: Tympanic membrane, external ear and ear canal normal.   Nose: Nose normal.   Mouth/Throat: Oropharynx is clear and moist.   Eyes: Conjunctivae and EOM are normal. Pupils are equal, round, and reactive to light.   Visual acuity 20/20 in both eyes.   Neck: Neck supple.   Normal range of motion.  Cardiovascular:  Normal rate, regular rhythm, normal heart sounds and intact distal pulses.           Pulmonary/Chest: Breath sounds normal. No respiratory distress. She has no wheezes. She has no rhonchi. She has no rales.   Abdominal: Abdomen is soft. Bowel sounds are normal. She exhibits no distension. There is no abdominal tenderness. There is no rebound and no guarding.   Musculoskeletal:         General: Normal range of motion.      Cervical back: Normal range of motion and neck supple.      Comments: Full range of motion of all extremities.     Neurological: She is alert and oriented to person, place, and time. She has normal strength. No cranial nerve deficit or sensory deficit.   No nystagmus.  No focal neurological  deficits.   Skin: Skin is warm and dry.   Psychiatric: Her behavior is normal. Judgment and thought content normal.   Very anxious.  Pressured speech.         ED Course   Procedures  Labs Reviewed   CBC W/ AUTO DIFFERENTIAL - Abnormal; Notable for the following components:       Result Value    MPV 9.1 (*)     All other components within normal limits   CK - Abnormal; Notable for the following components:     (*)     All other components within normal limits   BASIC METABOLIC PANEL          Imaging Results    None          Medications   sodium chloride 0.9% bolus 500 mL 500 mL (0 mLs Intravenous Stopped 9/19/23 2207)   prochlorperazine injection Soln 10 mg (10 mg Intravenous Given 9/19/23 2137)   ketorolac injection 15 mg (15 mg Intravenous Given 9/19/23 2137)   diphenhydrAMINE injection 25 mg (25 mg Intravenous Given 9/19/23 2204)   sodium chloride 0.9% bolus 1,000 mL 1,000 mL (0 mLs Intravenous Stopped 9/19/23 2236)     Medical Decision Making  Amount and/or Complexity of Data Reviewed  Labs: ordered.    Risk  Prescription drug management.                          Medical Decision Making:   Initial Assessment:   Urgent evaluation of 33-year-old female who presents with persistent headaches, blurry vision for 3 days, intermittent numbness in bilateral arms, and worsening anxiety.  Was seen by Neurology 2 months ago and had negative workup.  No relief of headaches with Tylenol or ibuprofen.  Has not been evaluated by Psychiatry in a while.  States that she tried Lexapro once but did not like how it made her feel.  She has not been on any medications for her anxiety.  She has been seen in the ED for similar complaints few times in the past.  Had negative workup.  On exam, she is anxious appearing but nontoxic.  Hemodynamically stable.  Rest of physical exam benign.  Plan for labs.  Will check visual acuity.  Do not feel imaging is necessary at this time given that she is experiencing similar presentation of  chronic headaches.  She denies recent falls or head trauma.  Clinical Tests:   Lab Tests: Ordered and Reviewed  ED Management:  Visual acuity intact.  20/20 in left, right, and both eyes.  On review of labs, no leukocytosis.  H&H stable.  BMP is grossly unremarkable.  CPK mildly elevated at 264.  Patient currently receiving IV fluids.  I updated the patient on all results.  On reassessment, she was feeling better after receiving medications.  I informed her that she needs to follow-up with Neurology and Psychiatry for further workup and medication management.  Will discharge home with Fioricet and few doses of Ativan.  Stressed importance of follow-up with Neurology and Psychiatry for chronic medication management.  Advised her to stay hydrated.  Patient verbalized understanding and agreement with this plan of care. She was given specific return precautions. Advised to follow up with PCP as needed. All questions and concerns addressed. She is stable for discharge.     This note was created with MModal Fluency Direct Dictation. Please excuse any spelling or grammatical errors.      Clinical Impression:   Final diagnoses:  [R51.9] Acute nonintractable headache, unspecified headache type (Primary)  [F41.9] Anxiety        ED Disposition Condition    Discharge Stable          ED Prescriptions       Medication Sig Dispense Start Date End Date Auth. Provider    butalbital-acetaminophen-caffeine -40 mg (FIORICET, ESGIC) -40 mg per tablet Take 1 tablet by mouth every 6 (six) hours as needed for Pain or Headaches. 28 tablet 9/19/2023 9/26/2023 Boo Pineda PA-C    LORazepam (ATIVAN) 1 MG tablet Take 0.5 tablets (0.5 mg total) by mouth every 6 (six) hours as needed for Anxiety. 10 tablet 9/19/2023 9/24/2023 Boo Pineda PA-C          Follow-up Information    None          Boo Pineda PA-C  09/20/23 0106       Boo Pineda PA-C  09/20/23 0106

## 2023-09-20 NOTE — DISCHARGE INSTRUCTIONS
Please take Fioricet as needed for headaches.  Please follow-up with Neurology for headaches.  Referral has been placed.  Please take Ativan as needed for anxiety and panic attacks.  Please follow-up with psychiatry.  Referral has been placed.  Make sure you increase your fluid intake.  Fluids containing electrolytes are good.

## 2023-09-23 ENCOUNTER — NURSE TRIAGE (OUTPATIENT)
Dept: ADMINISTRATIVE | Facility: CLINIC | Age: 33
End: 2023-09-23
Payer: MEDICAID

## 2023-09-23 ENCOUNTER — HOSPITAL ENCOUNTER (EMERGENCY)
Facility: OTHER | Age: 33
Discharge: HOME | End: 2023-09-23
Attending: EMERGENCY MEDICINE
Payer: MEDICAID

## 2023-09-23 VITALS
BODY MASS INDEX: 27.8 KG/M2 | DIASTOLIC BLOOD PRESSURE: 65 MMHG | HEART RATE: 68 BPM | RESPIRATION RATE: 16 BRPM | OXYGEN SATURATION: 100 % | HEIGHT: 66 IN | TEMPERATURE: 98 F | WEIGHT: 173 LBS | SYSTOLIC BLOOD PRESSURE: 124 MMHG

## 2023-09-23 DIAGNOSIS — M79.602 LEFT ARM PAIN: Primary | ICD-10-CM

## 2023-09-23 LAB
B-HCG UR QL: NEGATIVE
BILIRUB UR QL STRIP: NEGATIVE
CLARITY UR: CLEAR
COLOR UR: YELLOW
CTP QC/QA: YES
GLUCOSE UR QL STRIP: NEGATIVE
HGB UR QL STRIP: NEGATIVE
KETONES UR QL STRIP: NEGATIVE
LEUKOCYTE ESTERASE UR QL STRIP: NEGATIVE
NITRITE UR QL STRIP: NEGATIVE
PH UR STRIP: 7 [PH] (ref 5–8)
PROT UR QL STRIP: NEGATIVE
SP GR UR STRIP: 1.01 (ref 1–1.03)
URN SPEC COLLECT METH UR: NORMAL
UROBILINOGEN UR STRIP-ACNC: NEGATIVE EU/DL

## 2023-09-23 PROCEDURE — 81003 URINALYSIS AUTO W/O SCOPE: CPT | Performed by: EMERGENCY MEDICINE

## 2023-09-23 PROCEDURE — 99284 EMERGENCY DEPT VISIT MOD MDM: CPT

## 2023-09-23 PROCEDURE — 96372 THER/PROPH/DIAG INJ SC/IM: CPT | Performed by: EMERGENCY MEDICINE

## 2023-09-23 PROCEDURE — 63600175 PHARM REV CODE 636 W HCPCS: Performed by: EMERGENCY MEDICINE

## 2023-09-23 PROCEDURE — 81025 URINE PREGNANCY TEST: CPT | Performed by: EMERGENCY MEDICINE

## 2023-09-23 RX ORDER — BUTALBITAL, ACETAMINOPHEN AND CAFFEINE 50; 325; 40 MG/1; MG/1; MG/1
1 TABLET ORAL EVERY 6 HOURS PRN
Qty: 28 TABLET | Refills: 0 | Status: SHIPPED | OUTPATIENT
Start: 2023-09-23 | End: 2023-09-30

## 2023-09-23 RX ORDER — KETOROLAC TROMETHAMINE 30 MG/ML
15 INJECTION, SOLUTION INTRAMUSCULAR; INTRAVENOUS
Status: COMPLETED | OUTPATIENT
Start: 2023-09-23 | End: 2023-09-23

## 2023-09-23 RX ORDER — DEXAMETHASONE SODIUM PHOSPHATE 4 MG/ML
8 INJECTION, SOLUTION INTRA-ARTICULAR; INTRALESIONAL; INTRAMUSCULAR; INTRAVENOUS; SOFT TISSUE
Status: COMPLETED | OUTPATIENT
Start: 2023-09-23 | End: 2023-09-23

## 2023-09-23 RX ADMIN — KETOROLAC TROMETHAMINE 15 MG: 30 INJECTION, SOLUTION INTRAMUSCULAR; INTRAVENOUS at 03:09

## 2023-09-23 RX ADMIN — DEXAMETHASONE SODIUM PHOSPHATE 8 MG: 4 INJECTION INTRA-ARTICULAR; INTRALESIONAL; INTRAMUSCULAR; INTRAVENOUS; SOFT TISSUE at 03:09

## 2023-09-23 NOTE — ED NOTES
LOC: The patient is awake, alert, and oriented to self, place, time, and situation. Pt is calm and cooperative. Affect is appropriate.  Speech is appropriate and clear.     APPEARANCE: Patient sitting in chair in no acute distress.  Patient is clean and well groomed.    SKIN: The skin is warm and dry; color consistent with ethnicity.  Patient has normal skin turgor and moist mucus membranes.  Skin intact; no breakdown or bruising noted.     MUSCULOSKELETAL: Patient moving upper and lower extremities without difficulty though pt complains of pain in the left shoulder and forearm; denies pain in the back.  Denies weakness.     RESPIRATORY: Airway is open and patent. Respirations spontaneous, even, easy, and non-labored.  Patient has a normal effort and rate.  No accessory muscle use noted. Denies cough.     CARDIAC:  Normal rate noted.  No peripheral edema noted. No complaints of chest pain.     ABDOMEN: Soft and non tender to palpation.  No distention noted. Pt denies abdominal pain; denies nausea, vomiting, diarrhea, or constipation.    NEUROLOGIC: Eyes open spontaneously.  Behavior appropriate to situation.  Follows commands; facial expression symmetrical.  Purposeful motor response noted; normal sensation in all extremities. Pt denies headache; denies lightheadedness or dizziness; denies visual disturbances; denies loss of balance; denies unilateral weakness.        
Private car

## 2023-09-23 NOTE — DISCHARGE INSTRUCTIONS
As explained, your x-ray did not reveal any bone problems.  I suspect that your pain is from muscles.  You received a steroid injection here which should help your symptoms in the next day.  Continue to take ibuprofen 800 mg every 8 hours as needed for pain.  Return to the ER for any severe pain weakness, numbness, chest pain, or other concerning symptoms.

## 2023-09-23 NOTE — ED PROVIDER NOTES
"Encounter Date: 2023    SCRIBE #1 NOTE: I, Carol Margie, am scribing for, and in the presence of,  Salazar Sozua MD.       History     Chief Complaint   Patient presents with    Arm Pain     Pt reporting L arm pain x 1 day. Denies injury. Pt requesting imaging.      Ken Bills is a 33 y.o. female who presents to the ED with left arm pain since yesterday.  Patient is frustrated that I am asking her history for the "3rd time" today.  She reports pain began to left shoulder and radiates just past the left elbow.  No trauma or increased use.  It is severe.  No relief despite 800 mg ibuprofen, muscle relaxant, Tylenol, ice.  She reports history of similar episodes without a clear cause.  She requests x-rays.  No weakness, numbness.  No neck pain.  No chest pain.  Pain is constant for over 24 hours.  Of note, patient was seen in the ER 2 days ago for headache.    The history is provided by the patient.     Review of patient's allergies indicates:  No Known Allergies  Past Medical History:   Diagnosis Date    Allergic      Past Surgical History:   Procedure Laterality Date     SECTION N/A 2018    Procedure:  SECTION;  Surgeon: Jesi Iqbal MD;  Location: [facility] L[facility]D;  Service: OB/GYN;  Laterality: N/A;    NO PAST SURGERIES       Family History   Problem Relation Age of Onset    Breast cancer Neg Hx     Colon cancer Neg Hx     Ovarian cancer Neg Hx      Social History     Tobacco Use    Smoking status: Never    Smokeless tobacco: Never   Substance Use Topics    Alcohol use: Not Currently     Comment: rarely    Drug use: No     Review of Systems   Constitutional:  Negative for appetite change, chills and fever.   Cardiovascular:  Negative for palpitations.   Musculoskeletal:  Negative for back pain and neck pain.        Positive for left arm pain.    Skin:  Negative for color change, pallor and rash.   Neurological:  Negative for dizziness and headaches.       Physical Exam "     Initial Vitals [09/23/23 1138]   BP Pulse Resp Temp SpO2   127/66 69 17 98.3 °F (36.8 °C) 100 %      MAP       --         Physical Exam    Nursing note and vitals reviewed.  Constitutional: She appears well-developed and well-nourished. She is not diaphoretic. No distress.   HENT:   Head: Normocephalic.   Cardiovascular:  Normal rate.           Pulses:       Radial pulses are 2+ on the left side.   Pulmonary/Chest: No stridor. No respiratory distress.   Musculoskeletal:         General: Normal range of motion.      Comments: LUE: full active range of motion, No bony tenderness, General muscular tenderness throughout.  Good  strength. Distal pulses preserved.      Neurological: She is alert and oriented to person, place, and time.   Psychiatric:   Anxious, irritated         ED Course   Procedures  Labs Reviewed   URINALYSIS, REFLEX TO URINE CULTURE    Narrative:     Specimen Source->Urine   POCT URINE PREGNANCY          Imaging Results              X-Ray Elbow Complete Left (Final result)  Result time 09/23/23 16:05:55      Final result by Malcolm Zamora MD (09/23/23 16:05:55)                   Impression:      No acute displaced fracture-dislocation identified.      Electronically signed by: Malcolm Zamora MD  Date:    09/23/2023  Time:    16:05               Narrative:    EXAMINATION:  XR ELBOW COMPLETE 3 VIEW LEFT    CLINICAL HISTORY:  Pain in left arm    TECHNIQUE:  AP, lateral, and oblique views of the left elbow were performed.    COMPARISON:  None    FINDINGS:  Bones are well mineralized. Overall alignment is within normal limits.  No displaced fracture, dislocation or destructive osseous process.  No large elbow joint effusion.  Joint space appear relatively maintained. No subcutaneous emphysema or radiodense retained foreign body.                                       X-Ray Shoulder Trauma Left (Final result)  Result time 09/23/23 16:08:53      Final result by Malcolm Zamora MD (09/23/23 16:08:53)                    Impression:      No acute displaced fracture-dislocation identified.      Electronically signed by: Malcolm Zamora MD  Date:    09/23/2023  Time:    16:08               Narrative:    EXAMINATION:  XR SHOULDER TRAUMA 3 VIEW LEFT    CLINICAL HISTORY:  Pain in left arm    TECHNIQUE:  Three views of the left shoulder were performed.    COMPARISON  Chest radiograph 08/24/2023    FINDINGS:  Bones are well mineralized. Overall alignment is within normal limits.  No displaced fracture, dislocation or destructive osseous process.  Os acromiale configuration noted.  Joint spaces appear relatively maintained.  No subcutaneous emphysema or radiodense retained foreign body.  Left lung apex is clear.                                    X-Rays:   Independently Interpreted Readings:   Other Readings:  Left shoulder and left elbow: negative for any fracture     Medications   ketorolac injection 15 mg (15 mg Intramuscular Given 9/23/23 1521)   dexAMETHasone injection 8 mg (8 mg Intramuscular Given 9/23/23 1521)     Medical Decision Making  Ken Bills is a 33 y.o. female who presents to the ED with left arm pain since yesterday.     Muscular pain, fracture, osseous abnormality, rhabdo    No asymmetry to either arm, swelling to suggest DVT.  Distal pulses, sensation, cap refill preserved.  No evidence of arterial issue or neurovascular compromise.  She reports over 24 hours of constant pain throughout left arm that is reproducible and no chest pain, doubt ACS.  Will obtain plain films.  Will administer ketorolac and dexamethasone.  Patient's presentation to be quite atypical for rhabdo but she had a mildly elevated CPK 2 days ago during an ER visit.  Will obtain UA to investigate for blood.    Reassessment:  UA inconsistent with rhabdo.  Plain films negative for any acute osseous abnormality.  On repeat assessment, patient reports pain has improved.  Suspect some muscular component to her symptoms.  Advised  RICE, NSAIDs. Return precautions.    Amount and/or Complexity of Data Reviewed  Labs: ordered.  Radiology: ordered and independent interpretation performed.    Risk  Prescription drug management.            Scribe Attestation:   Scribe #1: I performed the above scribed service and the documentation accurately describes the services I performed. I attest to the accuracy of the note.    Physician Attestation for Scribe: I, Harley, reviewed documentation as scribed in my presence, which is both accurate and complete.                      Clinical Impression:   Final diagnoses:  [M79.602] Left arm pain (Primary)        ED Disposition Condition    Discharge Stable          ED Prescriptions    None       Follow-up Information       Follow up With Specialties Details Why Contact Info    [facility] - Emergency Dept Emergency Medicine  As needed, If symptoms worsen [address]  [facility] [id number] [phone]             Salazar Souza MD  09/23/23 2059

## 2023-09-23 NOTE — FIRST PROVIDER EVALUATION
Emergency Department TeleTriage Encounter Note      CHIEF COMPLAINT    Chief Complaint   Patient presents with    Arm Pain     Pt reporting L arm pain x 1 day. Denies injury. Pt requesting imaging.        VITAL SIGNS   Initial Vitals [09/23/23 1138]   BP Pulse Resp Temp SpO2   127/66 69 17 98.3 °F (36.8 °C) 100 %      MAP       --            ALLERGIES    Review of patient's allergies indicates:  No Known Allergies    PROVIDER TRIAGE NOTE  This is a teletriage evaluation of a 33 y.o. female presenting to the ED complaining of arm pain. Patient reports left arm pain for one day. She denies trauma or injury. She states pain starts around her neck and radiates into her arm. She took tylenol, ibuprofen, and muscle relaxants without relief.    Patient is alert and oriented. She speaks in complete sentences. She is sitting upright in the chair in no distress.     Initial orders will be placed and care will be transferred to an alternate provider when patient is roomed for a full evaluation. Any additional orders and the final disposition will be determined by that provider.         ORDERS  Labs Reviewed - No data to display    ED Orders (720h ago, onward)      None              Virtual Visit Note: The provider triage portion of this emergency department evaluation and documentation was performed via earthmine, a HIPAA-compliant telemedicine application, in concert with a tele-presenter in the room. A face to face patient evaluation with one of my colleagues will occur once the patient is placed in an emergency department room.      DISCLAIMER: This note was prepared with [employer] voice recognition transcription software. Garbled syntax, mangled pronouns, and other bizarre constructions may be attributed to that software system.

## 2023-09-23 NOTE — TELEPHONE ENCOUNTER
Pt calls c/o left arm pain and states that it has been bothering her since yesterday. She denies hx of heart attack or angina. Pt states that she was also seen in the ED on 9/19 for a separate issue and prescribed Fioricet. The RX transmission to pharmacy failed per eMar as follows:    Outpatient Medication Detail     Disp Refills Start End JON   butalbital-acetaminophen-caffeine -40 mg (FIORICET, ESGIC) -40 mg per tablet 28 tablet 0 9/19/2023 9/26/2023 No   Sig - Route: Take 1 tablet by mouth every 6 (six) hours as needed for Pain or Headaches. - Oral   Sent to pharmacy as: butalbital-acetaminophen-caffeine -40 mg (FIORICET, ESGIC) -40 mg per tablet   Class: Normal   Order: [id number]   Date/Time Signed: 9/19/2023 22:26       E-Prescribing Status: Transmission to pharmacy failed (9/20/2023  8:44 AM CDT)     Pharmacy    [facility] #[id number] - [place] - [address]          Pt agrees to answer triage questions based upon her arm pain. Per Care Advice, pt is advised to be seen within the next 24 hours. UCC and ED discussed as in-person care options. Pt states she has to find someone to watch her child in order to go be evaluated.    NT speaks to pharmacy re: above rx. Pharmacy staff states rx needs to be resent as it is now a controlled substance and the prescriber's ESDRAS number is necessary. NT calls [facility] for further assistance. ED advises that prescription will be resent to pt's preferred pharmacy. Pt is informed.    Pt is instructed to call back with any new/worsening sxs, questions, or concerns. She verbalizes understanding.   Reason for Disposition   Numbness (i.e., loss of sensation) in hand or fingers   [1] Prescription not at pharmacy AND [2] was prescribed by PCP recently  (Exception: Triager has access to EMR and prescription is recorded there. Go to Home Care and confirm for pharmacy.)    Additional Information   Negative: Shock suspected (e.g., cold/pale/clammy  "skin, too weak to stand, low BP, rapid pulse)   Negative: [1] Similar pain previously AND [2] it was from "heart attack"   Negative: [1] Similar pain previously AND [2] it was from "angina" AND [3] not relieved by nitroglycerin   Negative: Sounds like a life-threatening emergency to the triager   Negative: Difficulty breathing or unusual sweating (e.g., sweating without exertion)   Negative: [1] Age > 40 AND [2] associated chest or jaw pain AND [3] pain lasts > 5 minutes   Negative: [1] Age > 40 AND [2] no obvious cause AND [3] pain even when not moving the arm  (Exception: Pain is clearly made worse by moving arm or bending neck.)   Negative: [1] SEVERE pain AND [2] not improved 2 hours after pain medicine   Negative: [1] Red area or streak AND [2] fever   Negative: [1] Swollen joint AND [2] fever   Negative: Patient sounds very sick or weak to the triager   Negative: [1] Red area or streak AND [2] large (> 2 in. or 5 cm)   Negative: Entire arm is swollen   Negative: [1] Cast on wrist or arm AND [2] now increased pain   Negative: Weakness (i.e., loss of strength) in hand or fingers  (Exception: Not truly weak; hand feels weak because of pain.)   Negative: [1] Arm pains with exertion (e.g., walking) AND [2] pain goes away on resting AND [3] not present now   Negative: [1] Painful rash AND [2] multiple small blisters grouped together (i.e., dermatomal distribution or "band" or "stripe")   Negative: Looks like a boil, infected sore, deep ulcer or other infected rash (spreading redness, pus)   Negative: [1] Localized rash is very painful AND [2] no fever   Negative: Localized pain, redness or hard lump along vein    Protocols used: Arm Pain-A-AH, Medication Refill and Renewal Call-A-AH    "

## 2023-09-23 NOTE — ED TRIAGE NOTES
Pt presents to the ER with complaints of pain to the left shoulder and arm since yesterday morning; denies injury. Pt states pain is not affected by movement or palpation.

## 2023-11-03 ENCOUNTER — PATIENT OUTREACH (OUTPATIENT)
Dept: EMERGENCY MEDICINE | Facility: OTHER | Age: 33
End: 2023-11-03
Payer: MEDICAID

## 2024-02-21 ENCOUNTER — HOSPITAL ENCOUNTER (EMERGENCY)
Facility: OTHER | Age: 34
Discharge: HOME | End: 2024-02-21
Attending: EMERGENCY MEDICINE
Payer: MEDICAID

## 2024-02-21 VITALS
HEIGHT: 66 IN | SYSTOLIC BLOOD PRESSURE: 132 MMHG | WEIGHT: 169 LBS | OXYGEN SATURATION: 100 % | BODY MASS INDEX: 27.16 KG/M2 | RESPIRATION RATE: 18 BRPM | HEART RATE: 77 BPM | DIASTOLIC BLOOD PRESSURE: 94 MMHG | TEMPERATURE: 98 F

## 2024-02-21 DIAGNOSIS — R42 DIZZINESS: ICD-10-CM

## 2024-02-21 DIAGNOSIS — R51.9 ACUTE NONINTRACTABLE HEADACHE, UNSPECIFIED HEADACHE TYPE: ICD-10-CM

## 2024-02-21 DIAGNOSIS — R53.83 FATIGUE, UNSPECIFIED TYPE: Primary | ICD-10-CM

## 2024-02-21 LAB
ALBUMIN SERPL BCP-MCNC: 4.1 G/DL (ref 3.5–5.2)
ALP SERPL-CCNC: 80 U/L (ref 55–135)
ALT SERPL W/O P-5'-P-CCNC: 21 U/L (ref 10–44)
ANION GAP SERPL CALC-SCNC: 8 MMOL/L (ref 8–16)
AST SERPL-CCNC: 17 U/L (ref 10–40)
B-HCG UR QL: NEGATIVE
BACTERIA #/AREA URNS HPF: ABNORMAL /HPF
BASOPHILS # BLD AUTO: 0.03 K/UL (ref 0–0.2)
BASOPHILS NFR BLD: 0.4 % (ref 0–1.9)
BILIRUB SERPL-MCNC: 0.4 MG/DL (ref 0.1–1)
BILIRUB UR QL STRIP: NEGATIVE
BUN SERPL-MCNC: 14 MG/DL (ref 6–20)
CALCIUM SERPL-MCNC: 9.5 MG/DL (ref 8.7–10.5)
CHLORIDE SERPL-SCNC: 103 MMOL/L (ref 95–110)
CLARITY UR: ABNORMAL
CO2 SERPL-SCNC: 25 MMOL/L (ref 23–29)
COLOR UR: YELLOW
CREAT SERPL-MCNC: 0.9 MG/DL (ref 0.5–1.4)
CTP QC/QA: YES
DIFFERENTIAL METHOD BLD: ABNORMAL
EOSINOPHIL # BLD AUTO: 0.1 K/UL (ref 0–0.5)
EOSINOPHIL NFR BLD: 1.6 % (ref 0–8)
ERYTHROCYTE [DISTWIDTH] IN BLOOD BY AUTOMATED COUNT: 12.4 % (ref 11.5–14.5)
EST. GFR  (NO RACE VARIABLE): >60 ML/MIN/1.73 M^2
GLUCOSE SERPL-MCNC: 73 MG/DL (ref 70–110)
GLUCOSE UR QL STRIP: NEGATIVE
HCT VFR BLD AUTO: 43.4 % (ref 37–48.5)
HGB BLD-MCNC: 14.2 G/DL (ref 12–16)
HGB UR QL STRIP: ABNORMAL
IMM GRANULOCYTES # BLD AUTO: 0.01 K/UL (ref 0–0.04)
IMM GRANULOCYTES NFR BLD AUTO: 0.1 % (ref 0–0.5)
KETONES UR QL STRIP: NEGATIVE
LEUKOCYTE ESTERASE UR QL STRIP: ABNORMAL
LYMPHOCYTES # BLD AUTO: 2.4 K/UL (ref 1–4.8)
LYMPHOCYTES NFR BLD: 32.5 % (ref 18–48)
MCH RBC QN AUTO: 28.9 PG (ref 27–31)
MCHC RBC AUTO-ENTMCNC: 32.7 G/DL (ref 32–36)
MCV RBC AUTO: 88 FL (ref 82–98)
MICROSCOPIC COMMENT: ABNORMAL
MONOCYTES # BLD AUTO: 0.5 K/UL (ref 0.3–1)
MONOCYTES NFR BLD: 6.8 % (ref 4–15)
NEUTROPHILS # BLD AUTO: 4.3 K/UL (ref 1.8–7.7)
NEUTROPHILS NFR BLD: 58.6 % (ref 38–73)
NITRITE UR QL STRIP: NEGATIVE
NRBC BLD-RTO: 0 /100 WBC
PH UR STRIP: 7 [PH] (ref 5–8)
PLATELET # BLD AUTO: 320 K/UL (ref 150–450)
PMV BLD AUTO: 8.8 FL (ref 9.2–12.9)
POC MOLECULAR INFLUENZA A AGN: NEGATIVE
POC MOLECULAR INFLUENZA B AGN: NEGATIVE
POTASSIUM SERPL-SCNC: 4.5 MMOL/L (ref 3.5–5.1)
PROT SERPL-MCNC: 8.3 G/DL (ref 6–8.4)
PROT UR QL STRIP: NEGATIVE
RBC # BLD AUTO: 4.92 M/UL (ref 4–5.4)
RBC #/AREA URNS HPF: 1 /HPF (ref 0–4)
SARS-COV-2 RDRP RESP QL NAA+PROBE: NEGATIVE
SODIUM SERPL-SCNC: 136 MMOL/L (ref 136–145)
SP GR UR STRIP: 1.01 (ref 1–1.03)
SQUAMOUS #/AREA URNS HPF: 27 /HPF
TROPONIN I SERPL DL<=0.01 NG/ML-MCNC: <0.006 NG/ML (ref 0–0.03)
URN SPEC COLLECT METH UR: ABNORMAL
UROBILINOGEN UR STRIP-ACNC: NEGATIVE EU/DL
WBC # BLD AUTO: 7.35 K/UL (ref 3.9–12.7)
WBC #/AREA URNS HPF: 8 /HPF (ref 0–5)

## 2024-02-21 PROCEDURE — 93005 ELECTROCARDIOGRAM TRACING: CPT

## 2024-02-21 PROCEDURE — 81025 URINE PREGNANCY TEST: CPT | Performed by: PHYSICIAN ASSISTANT

## 2024-02-21 PROCEDURE — 99284 EMERGENCY DEPT VISIT MOD MDM: CPT | Mod: 25

## 2024-02-21 PROCEDURE — 25000003 PHARM REV CODE 250: Performed by: NURSE PRACTITIONER

## 2024-02-21 PROCEDURE — 63600175 PHARM REV CODE 636 W HCPCS: Performed by: NURSE PRACTITIONER

## 2024-02-21 PROCEDURE — 96374 THER/PROPH/DIAG INJ IV PUSH: CPT

## 2024-02-21 PROCEDURE — 80053 COMPREHEN METABOLIC PANEL: CPT | Performed by: NURSE PRACTITIONER

## 2024-02-21 PROCEDURE — 85025 COMPLETE CBC W/AUTO DIFF WBC: CPT | Performed by: NURSE PRACTITIONER

## 2024-02-21 PROCEDURE — 96361 HYDRATE IV INFUSION ADD-ON: CPT

## 2024-02-21 PROCEDURE — 81000 URINALYSIS NONAUTO W/SCOPE: CPT | Performed by: PHYSICIAN ASSISTANT

## 2024-02-21 PROCEDURE — 87635 SARS-COV-2 COVID-19 AMP PRB: CPT | Performed by: PHYSICIAN ASSISTANT

## 2024-02-21 PROCEDURE — 93010 ELECTROCARDIOGRAM REPORT: CPT | Mod: ,,, | Performed by: INTERNAL MEDICINE

## 2024-02-21 PROCEDURE — 84484 ASSAY OF TROPONIN QUANT: CPT | Performed by: NURSE PRACTITIONER

## 2024-02-21 RX ORDER — KETOROLAC TROMETHAMINE 10 MG/1
10 TABLET, FILM COATED ORAL EVERY 6 HOURS PRN
Qty: 12 TABLET | Refills: 0 | Status: SHIPPED | OUTPATIENT
Start: 2024-02-21

## 2024-02-21 RX ORDER — METHOCARBAMOL 500 MG/1
1000 TABLET, FILM COATED ORAL 3 TIMES DAILY
Qty: 30 TABLET | Refills: 0 | Status: SHIPPED | OUTPATIENT
Start: 2024-02-21 | End: 2024-02-26

## 2024-02-21 RX ORDER — KETOROLAC TROMETHAMINE 30 MG/ML
15 INJECTION, SOLUTION INTRAMUSCULAR; INTRAVENOUS
Status: COMPLETED | OUTPATIENT
Start: 2024-02-21 | End: 2024-02-21

## 2024-02-21 RX ORDER — METHOCARBAMOL 500 MG/1
1000 TABLET, FILM COATED ORAL
Status: COMPLETED | OUTPATIENT
Start: 2024-02-21 | End: 2024-02-21

## 2024-02-21 RX ADMIN — METHOCARBAMOL 1000 MG: 500 TABLET ORAL at 05:02

## 2024-02-21 RX ADMIN — SODIUM CHLORIDE 1000 ML: 9 INJECTION, SOLUTION INTRAVENOUS at 05:02

## 2024-02-21 RX ADMIN — KETOROLAC TROMETHAMINE 15 MG: 30 INJECTION, SOLUTION INTRAMUSCULAR; INTRAVENOUS at 05:02

## 2024-02-21 NOTE — ED NOTES
POCT Rapid Covid-19 swab currently in process. Awaiting results.   POCT Influenza A/B Molecular swab currently in process. Awaiting results.

## 2024-02-21 NOTE — FIRST PROVIDER EVALUATION
Emergency Department TeleTriage Encounter Note      CHIEF COMPLAINT    Chief Complaint   Patient presents with    Fatigue     Pt reporting generalized weakness and dizziness x 4 days. Denies N/V/D/fever.        VITAL SIGNS   Initial Vitals [02/21/24 1501]   BP Pulse Resp Temp SpO2   (!) 143/71 81 18 98.4 °F (36.9 °C) 100 %      MAP       --            ALLERGIES    Review of patient's allergies indicates:  No Known Allergies    PROVIDER TRIAGE NOTE  This is a teletriage evaluation of a 33 y.o. female presenting to the ED complaining of body aches and fatigue for the past four days. Denies n/v/d and fever.     Alert, no distress.     Initial orders will be placed and care will be transferred to an alternate provider when patient is roomed for a full evaluation. Any additional orders and the final disposition will be determined by that provider.         ORDERS  Labs Reviewed   URINALYSIS, REFLEX TO URINE CULTURE   POCT URINE PREGNANCY   POCT INFLUENZA A/B MOLECULAR   SARS-COV-2 RDRP GENE       ED Orders (720h ago, onward)      Start Ordered     Status Ordering Provider    02/21/24 1543 02/21/24 1542  POCT urine pregnancy  Once         Ordered APRIL LORD    02/21/24 1543 02/21/24 1542  POCT Influenza A/B Molecular  Once         Ordered APRIL LORD    02/21/24 1543 02/21/24 1542  POCT COVID-19 Rapid Screening  Once         Ordered APRIL LORD    02/21/24 1543 02/21/24 1542  Urinalysis, Reflex to Urine Culture Urine, Clean Catch  STAT         Ordered APRIL LORD              Virtual Visit Note: The provider triage portion of this emergency department evaluation and documentation was performed via [employer], a HIPAA-compliant telemedicine application, in concert with a tele-presenter in the room. A face to face patient evaluation with one of my colleagues will occur once the patient is placed in an emergency department room.      DISCLAIMER: This note was prepared with M*Modal voice  recognition transcription software. Garbled syntax, mangled pronouns, and other bizarre constructions may be attributed to that software system.

## 2024-02-21 NOTE — ED TRIAGE NOTES
States seen by neurologist in past for frequent episodes of muscle fatigue and weakness and was told she had fibromyalgia. Presents today with c/o generalized weakness and muscle fatigue over past 4 days. States arms feel heavy and weak with intermittent pains in both arms. Denies taking OTC meds for symptoms. Presents in no distress.

## 2024-02-22 LAB
OHS QRS DURATION: 82 MS
OHS QTC CALCULATION: 393 MS

## 2024-02-22 NOTE — ED PROVIDER NOTES
"     Source of History:  Patient    Chief complaint:  Fatigue (Pt reporting generalized weakness and dizziness x 4 days. Denies N/V/D/fever. )      HPI:  Ken Bills is a 33 y.o. female with reported history of fibromyalgia presenting with feeling generally weak and dizzy intermittently for the past few months but worse over the course of the last few days.  Patient also reports frequent headaches as well as muscle spasms in her chest.  She denies chest pain but states that she intermittently gets these spasms.  She recently started taking pregabalin for her fibromyalgia.  She states she has a child at home and she can not continue to feel like this she does not feel like herself and wants to know what is wrong.  She would like an EKG and her heart evaluated.  She denies fever, chills, visual disturbances, nausea, vomiting, abdominal pain, chest pain and shortness of breath, dysuria currently.  She has not taken any medications for her symptoms today.    This is the extent to the patients complaints today here in the emergency department.    ROS: As per HPI     Review of patient's allergies indicates:  No Known Allergies    PMH:  As per HPI and below:  Past Medical History:   Diagnosis Date    Allergic      Past Surgical History:   Procedure Laterality Date     SECTION N/A 2018    Procedure:  SECTION;  Surgeon: Jesi Iqbal MD;  Location: [facility];  Service: OB/GYN;  Laterality: N/A;    NO PAST SURGERIES         Social History     Tobacco Use    Smoking status: Never    Smokeless tobacco: Never   Substance Use Topics    Alcohol use: Not Currently     Comment: rarely    Drug use: No       Physical Exam:    BP (!) 132/94 (BP Location: Left arm, Patient Position: Sitting)   Pulse 77   Temp 98.4 °F (36.9 °C) (Oral)   Resp 18   Ht 5' 6" (1.676 m)   Wt 76.7 kg (169 lb)   SpO2 100%   BMI 27.28 kg/m²   Nursing note and vital signs reviewed.  Appearance: No acute " distress.  Eyes: No conjunctival injection.  ENT: Oropharynx clear.    Chest/ Respiratory: Clear to auscultation bilaterally.  Good air movement.  No wheezes.  No rhonchi. No rales. No accessory muscle use.  Cardiovascular: Regular rate and rhythm.  No murmurs. No gallops. No rubs.  Abdomen: Soft.  Not distended.  Nontender.  No guarding.  No rebound. Non-peritoneal.  Musculoskeletal: Good range of motion all joints.  No deformities.  Neck supple.  No meningismus.  Skin: No rashes seen.  Good turgor.  No abrasions.  No ecchymoses.  Neurologic: Motor intact.  Sensation intact.  Cerebellar intact.  Cranial nerves intact.  Mental Status:  Alert and oriented x 3.  Appropriate, conversant    Labs that have been ordered have been independently reviewed and interpreted by myself.        Labs Reviewed   URINALYSIS, REFLEX TO URINE CULTURE - Abnormal; Notable for the following components:       Result Value    Appearance, UA Hazy (*)     Occult Blood UA 2+ (*)     Leukocytes, UA Trace (*)     All other components within normal limits    Narrative:     Specimen Source->Urine   CBC W/ AUTO DIFFERENTIAL - Abnormal; Notable for the following components:    MPV 8.8 (*)     All other components within normal limits   URINALYSIS MICROSCOPIC - Abnormal; Notable for the following components:    WBC, UA 8 (*)     All other components within normal limits    Narrative:     Specimen Source->Urine   COMPREHENSIVE METABOLIC PANEL   TROPONIN I   POCT URINE PREGNANCY   POCT INFLUENZA A/B MOLECULAR   SARS-COV-2 RDRP GENE       Imaging Results    None         Initial Impression/ Differential Dx:  Urgent evaluation of 33 y.o. female presenting with intermittent dizziness, headaches and generally feeling unwell and not like herself. Patient is afebrile, not toxic appearing and hemodynamically stable.  She is neurologically intact without focal deficits although patient was concerned about stroke, no evidence of CVA on my exam in patient denies  chest pain and shortness breath.  Considered but do not suspect ACS, however per patient request will obtain EKG and will obtain labs.  Will screen for COVID, flu administer fluids, Toradol and Robaxin and continue to reassess.    Differential Diagnosis includes, but is not limited to:  CVA/TIA, vertigo, anemia/blood loss, cardiogenic shock, arrhythmia, orthostatic hypotension, dehydration, medication side effect, vitamin deficiency, liver disease, hypothyroidism, drug intoxication/withdrawal, metabolic derangement.      MDM:        ED Course as of 02/21/24 1857 Wed Feb 21, 2024   1710 EKG independently interpreted by myself shows normal sinus rhythm at a rate of 68 beats per minute, normal intervals, no STEMI.  Grossly unchanged from prior May 2023 [CU]   1746 SARS-CoV-2 RNA, Amplification, Qual: Negative [CU]   1746 POC Molecular Influenza A Ag: Negative [CU]   1746 POC Molecular Influenza B Ag: Negative [CU]   1746 WBC: 7.35 [CU]   1746 Hemoglobin: 14.2 [CU]   1746 Hematocrit: 43.4 [CU]   1807 Troponin I: <0.006 [CU]   1808 Urinalysis Microscopic(!)  Consistent with contaminated sample, patient without symptoms of dysuria and nitrite negative urine. [CU]   1808 Comprehensive metabolic panel [CU]   1808 At bedside to reassess patient.  She reports relief of her headache and states she feels better no longer dizzy.  Discussed that Lyrica can cause headache and dizziness.  Recommend just taking it at night to see if that improves symptoms.  Alternatively patient can follow up with PCP and trial gabapentin as she continues to be symptomatic from Lyrica.  Patient states that she would like a prescription for Robaxin, will send.  Will also give a short course of Toradol as patient reports relief of her headaches with Toradol and no relief with Tylenol and ibuprofen that she was previously taken.  I do not feel like further emergent workup is indicated this time and patient is appropriate for outpatient management.  Patient educated on signs and symptoms to monitor for and when to return to ED. Patient verbalized understanding agrees with treatment plan. All questions and concerns addressed.      [CU]      ED Course User Index  [CU] Tommy Green NP                   Diagnostic Impression:    1. Fatigue, unspecified type    2. Dizziness    3. Acute nonintractable headache, unspecified headache type         ED Disposition Condition    Discharge Good            ED Prescriptions       Medication Sig Dispense Start Date End Date Auth. Provider    methocarbamoL (ROBAXIN) 500 MG Tab Take 2 tablets (1,000 mg total) by mouth 3 (three) times daily. for 5 days 30 tablet 2/21/2024 2/26/2024 Tommy Green NP    ketorolac (TORADOL) 10 mg tablet Take 1 tablet (10 mg total) by mouth every 6 (six) hours as needed for Pain. 12 tablet 2/21/2024 -- Tommy Green NP          Follow-up Information       Follow up With Specialties Details Why Contact Info    your primary care physician  In 3 days               Tommy Green NP  02/21/24 3348

## 2024-04-29 ENCOUNTER — HOSPITAL ENCOUNTER (EMERGENCY)
Facility: OTHER | Age: 34
Discharge: HOME | End: 2024-04-29
Attending: EMERGENCY MEDICINE
Payer: MEDICAID

## 2024-04-29 VITALS
RESPIRATION RATE: 16 BRPM | BODY MASS INDEX: 27.32 KG/M2 | WEIGHT: 170 LBS | HEART RATE: 72 BPM | HEIGHT: 66 IN | OXYGEN SATURATION: 98 % | SYSTOLIC BLOOD PRESSURE: 136 MMHG | DIASTOLIC BLOOD PRESSURE: 92 MMHG | TEMPERATURE: 98 F

## 2024-04-29 DIAGNOSIS — R03.0 ELEVATED BLOOD PRESSURE READING WITHOUT DIAGNOSIS OF HYPERTENSION: ICD-10-CM

## 2024-04-29 DIAGNOSIS — J11.1 INFLUENZA: Primary | ICD-10-CM

## 2024-04-29 LAB
B-HCG UR QL: NEGATIVE
BILIRUB UR QL STRIP: NEGATIVE
CLARITY UR: CLEAR
COLOR UR: YELLOW
CTP QC/QA: YES
GLUCOSE UR QL STRIP: NEGATIVE
HGB UR QL STRIP: NEGATIVE
KETONES UR QL STRIP: NEGATIVE
LEUKOCYTE ESTERASE UR QL STRIP: NEGATIVE
NITRITE UR QL STRIP: NEGATIVE
PH UR STRIP: 6 [PH] (ref 5–8)
POC MOLECULAR INFLUENZA A AGN: POSITIVE
POC MOLECULAR INFLUENZA B AGN: POSITIVE
PROT UR QL STRIP: NEGATIVE
SARS-COV-2 RDRP RESP QL NAA+PROBE: NEGATIVE
SP GR UR STRIP: 1.02 (ref 1–1.03)
URN SPEC COLLECT METH UR: NORMAL
UROBILINOGEN UR STRIP-ACNC: NEGATIVE EU/DL

## 2024-04-29 PROCEDURE — 81025 URINE PREGNANCY TEST: CPT | Performed by: EMERGENCY MEDICINE

## 2024-04-29 PROCEDURE — 81003 URINALYSIS AUTO W/O SCOPE: CPT | Performed by: EMERGENCY MEDICINE

## 2024-04-29 PROCEDURE — 25000003 PHARM REV CODE 250: Performed by: EMERGENCY MEDICINE

## 2024-04-29 PROCEDURE — 96372 THER/PROPH/DIAG INJ SC/IM: CPT | Performed by: EMERGENCY MEDICINE

## 2024-04-29 PROCEDURE — 99284 EMERGENCY DEPT VISIT MOD MDM: CPT | Mod: 25

## 2024-04-29 PROCEDURE — 63600175 PHARM REV CODE 636 W HCPCS: Performed by: EMERGENCY MEDICINE

## 2024-04-29 PROCEDURE — 87635 SARS-COV-2 COVID-19 AMP PRB: CPT | Performed by: EMERGENCY MEDICINE

## 2024-04-29 RX ORDER — NAPROXEN 500 MG/1
500 TABLET ORAL 2 TIMES DAILY PRN
Qty: 30 TABLET | Refills: 0 | Status: SHIPPED | OUTPATIENT
Start: 2024-04-29

## 2024-04-29 RX ORDER — KETOROLAC TROMETHAMINE 30 MG/ML
10 INJECTION, SOLUTION INTRAMUSCULAR; INTRAVENOUS
Status: COMPLETED | OUTPATIENT
Start: 2024-04-29 | End: 2024-04-29

## 2024-04-29 RX ORDER — ONDANSETRON 8 MG/1
8 TABLET, ORALLY DISINTEGRATING ORAL
Status: COMPLETED | OUTPATIENT
Start: 2024-04-29 | End: 2024-04-29

## 2024-04-29 RX ORDER — OSELTAMIVIR PHOSPHATE 75 MG/1
75 CAPSULE ORAL 2 TIMES DAILY
Qty: 10 CAPSULE | Refills: 0 | Status: SHIPPED | OUTPATIENT
Start: 2024-04-29 | End: 2024-04-29 | Stop reason: ALTCHOICE

## 2024-04-29 RX ORDER — ONDANSETRON 4 MG/1
4 TABLET, ORALLY DISINTEGRATING ORAL EVERY 8 HOURS PRN
Qty: 30 TABLET | Refills: 0 | Status: SHIPPED | OUTPATIENT
Start: 2024-04-29

## 2024-04-29 RX ADMIN — ONDANSETRON 8 MG: 8 TABLET, ORALLY DISINTEGRATING ORAL at 06:04

## 2024-04-29 RX ADMIN — KETOROLAC TROMETHAMINE 10 MG: 30 INJECTION, SOLUTION INTRAMUSCULAR; INTRAVENOUS at 06:04

## 2024-04-29 NOTE — Clinical Note
"Ken"Concha Bills was seen and treated in our emergency department on 4/29/2024.  She may return to work on 05/02/2024.       If you have any questions or concerns, please don't hesitate to call.      Vielka Nance MD"

## 2024-04-29 NOTE — ED PROVIDER NOTES
"Encounter Date: 2024    SCRIBE #1 NOTE: I, Andrea Tracey, am scribing for, and in the presence of,  Vielka Nance MD. I have scribed the entire note.       History     Chief Complaint   Patient presents with    Nausea     C/o nausea for the past 3 days,      Time seen by provider: 6:22 AM    This is a 33 y.o. female who presents with complaint of nausea. She also complains of headache, fatigue, muscle aches, weakness, and environmental induced congestion. She reports that symptoms began on Friday. She took tylenol for pain management with little relief. She states that she is concerned that elevated blood pressure is making her "feel off". She denies emesis, urinary symptoms, diarrhea, cough, or runny nose. She denies prescribed daily medications. She denies history of HTN. She denies recent increased sodium intake. This is the extent of the patient's complaints at this time.          The history is provided by the patient.     Review of patient's allergies indicates:  No Known Allergies  Past Medical History:   Diagnosis Date    Allergic      Past Surgical History:   Procedure Laterality Date     SECTION N/A 2018    Procedure:  SECTION;  Surgeon: Jesi Iqbal MD;  Location: [facility];  Service: OB/GYN;  Laterality: N/A;    NO PAST SURGERIES       Family History   Problem Relation Name Age of Onset    Breast cancer Neg Hx      Colon cancer Neg Hx      Ovarian cancer Neg Hx       Social History     Tobacco Use    Smoking status: Never    Smokeless tobacco: Never   Substance Use Topics    Alcohol use: Not Currently     Comment: rarely    Drug use: No     Review of Systems   Constitutional:  Positive for fatigue. Negative for unexpected weight change.   HENT:  Positive for congestion. Negative for nosebleeds and rhinorrhea.    Eyes:  Negative for pain.   Respiratory:  Negative for apnea and cough.    Cardiovascular:  Negative for palpitations.   Gastrointestinal:  Positive for nausea. " Negative for constipation, diarrhea and vomiting.   Genitourinary: Negative.  Negative for enuresis.   Musculoskeletal:  Positive for myalgias.   Skin:  Negative for pallor.   Neurological:  Positive for weakness and headaches.   Hematological:  Does not bruise/bleed easily.   Psychiatric/Behavioral:  Negative for sleep disturbance.        Physical Exam     Initial Vitals [04/29/24 0547]   BP Pulse Resp Temp SpO2   129/84 72 16 97.9 °F (36.6 °C) 98 %      MAP       --         Physical Exam    Constitutional:   Well appearing. Otherwise normal.            ED Course   Procedures  Labs Reviewed   POCT INFLUENZA A/B MOLECULAR - Abnormal; Notable for the following components:       Result Value    POC Molecular Influenza A Ag Positive (*)     POC Molecular Influenza B Ag Positive (*)     All other components within normal limits   URINALYSIS, REFLEX TO URINE CULTURE    Narrative:     Specimen Source->Urine   POCT URINE PREGNANCY   SARS-COV-2 RDRP GENE          Imaging Results    None          Medications   ondansetron disintegrating tablet 8 mg (8 mg Oral Given 4/29/24 0600)   ketorolac injection 9.999 mg (9.999 mg Intramuscular Given 4/29/24 0627)     Medical Decision Making  Initial impression: This is a 33 year old female who presents with fatigue, headaches, body aches, and sinus congestion. Mildly elevated blood pressure.     Plan: symptom control, flu and COVID testing.     Differential diagnosis: Viral upper respiratory infection, sinusitis, allergic rhinitis, bronchitis, influenza, pneumonia, dental infection, pharyngitis         Amount and/or Complexity of Data Reviewed  Labs: ordered. Decision-making details documented in ED Course.    Risk  Prescription drug management.            Scribe Attestation:   Scribe #1: I performed the above scribed service and the documentation accurately describes the services I performed. I attest to the accuracy of the note.     Physician Attestation for Scribe: I, Vielka Nance MD,  reviewed documentation as scribed in my presence, which is both accurate and complete.                            Clinical Impression:  Final diagnoses:  [J11.1] Influenza (Primary)  [R03.0] Elevated blood pressure reading without diagnosis of hypertension          ED Disposition Condition    Discharge Stable          ED Prescriptions       Medication Sig Dispense Start Date End Date Auth. Provider    naproxen (NAPROSYN) 500 MG tablet Take 1 tablet (500 mg total) by mouth 2 (two) times daily as needed (pain). Take with food 30 tablet 4/29/2024 -- Vielka Nance MD    ondansetron (ZOFRAN-ODT) 4 MG TbDL Take 1 tablet (4 mg total) by mouth every 8 (eight) hours as needed (nausea). 30 tablet 4/29/2024 -- Vielka Nance MD    oseltamivir (TAMIFLU) 75 MG capsule  (Status: Discontinued) Take 1 capsule (75 mg total) by mouth 2 (two) times daily. for 5 days 10 capsule 4/29/2024 4/29/2024 Vielka Nance MD          Follow-up Information    None          Vielka Nance MD  05/21/24 1834

## 2024-08-22 ENCOUNTER — HOSPITAL ENCOUNTER (EMERGENCY)
Facility: OTHER | Age: 34
Discharge: HOME | End: 2024-08-22
Attending: INTERNAL MEDICINE
Payer: MEDICAID

## 2024-08-22 VITALS
TEMPERATURE: 99 F | DIASTOLIC BLOOD PRESSURE: 84 MMHG | RESPIRATION RATE: 18 BRPM | SYSTOLIC BLOOD PRESSURE: 132 MMHG | OXYGEN SATURATION: 99 % | HEART RATE: 74 BPM

## 2024-08-22 DIAGNOSIS — R51.9 NONINTRACTABLE HEADACHE, UNSPECIFIED CHRONICITY PATTERN, UNSPECIFIED HEADACHE TYPE: Primary | ICD-10-CM

## 2024-08-22 LAB
B-HCG UR QL: NEGATIVE
CTP QC/QA: YES
SARS-COV-2 RDRP RESP QL NAA+PROBE: NEGATIVE

## 2024-08-22 PROCEDURE — U0002 COVID-19 LAB TEST NON-CDC: HCPCS | Performed by: INTERNAL MEDICINE

## 2024-08-22 PROCEDURE — 63600175 PHARM REV CODE 636 W HCPCS: Performed by: INTERNAL MEDICINE

## 2024-08-22 PROCEDURE — 99284 EMERGENCY DEPT VISIT MOD MDM: CPT | Mod: 25

## 2024-08-22 PROCEDURE — 81025 URINE PREGNANCY TEST: CPT | Performed by: INTERNAL MEDICINE

## 2024-08-22 PROCEDURE — 25000003 PHARM REV CODE 250: Performed by: INTERNAL MEDICINE

## 2024-08-22 PROCEDURE — 96374 THER/PROPH/DIAG INJ IV PUSH: CPT

## 2024-08-22 PROCEDURE — 96375 TX/PRO/DX INJ NEW DRUG ADDON: CPT

## 2024-08-22 PROCEDURE — 96361 HYDRATE IV INFUSION ADD-ON: CPT

## 2024-08-22 RX ORDER — DIPHENHYDRAMINE HYDROCHLORIDE 50 MG/ML
25 INJECTION INTRAMUSCULAR; INTRAVENOUS
Status: COMPLETED | OUTPATIENT
Start: 2024-08-22 | End: 2024-08-22

## 2024-08-22 RX ORDER — PROCHLORPERAZINE EDISYLATE 5 MG/ML
10 INJECTION INTRAMUSCULAR; INTRAVENOUS ONCE
Status: COMPLETED | OUTPATIENT
Start: 2024-08-22 | End: 2024-08-22

## 2024-08-22 RX ADMIN — PROCHLORPERAZINE EDISYLATE 10 MG: 5 INJECTION INTRAMUSCULAR; INTRAVENOUS at 01:08

## 2024-08-22 RX ADMIN — SODIUM CHLORIDE 1000 ML: 9 INJECTION, SOLUTION INTRAVENOUS at 01:08

## 2024-08-22 RX ADMIN — DIPHENHYDRAMINE HYDROCHLORIDE 25 MG: 50 INJECTION, SOLUTION INTRAMUSCULAR; INTRAVENOUS at 01:08

## 2024-08-22 NOTE — DISCHARGE INSTRUCTIONS
Diagnosis:   1. Nonintractable headache, unspecified chronicity pattern, unspecified headache type        Tests you had showed:   Labs Reviewed   SARS-COV-2 RNA AMPLIFICATION, QUAL       Result Value    SARS-CoV-2 RNA, Amplification, Qual Negative     POCT URINE PREGNANCY    POC Preg Test, Ur Negative       Acceptable Yes        No orders to display       Treatments you received were:   Medications   sodium chloride 0.9% bolus 1,000 mL 1,000 mL (1,000 mLs Intravenous New Bag 8/22/24 0141)   prochlorperazine injection Soln 10 mg (10 mg Intravenous Given 8/22/24 0141)   diphenhydrAMINE injection 25 mg (25 mg Intravenous Given 8/22/24 0141)       Home Care Instructions:  - Medications: Continue taking your home medications as prescribed    Follow-Up Plan:  - Follow-up with: Primary care doctor within 1-2  days  - Additional testing and/or evaluation will be directed by your primary doctor    Return to the Emergency Department for symptoms including but not limited to: worsening symptoms, severe back pain, shortness of breath or chest pain, vomiting with inability to hold down fluids, blood in vomit or poop, fevers greater than 100.4°F, passing out/fainting/unconsciousness, or other concerning symptoms.     No future appointments.

## 2024-08-22 NOTE — ED PROVIDER NOTES
SCRIBE #1 NOTE: Vik RUST, am scribing for, and in the presence of,  Sage Pak MD. I have scribed the following portions of the note - Other sections scribed: HPI, ROS, PE, DDX.        Encounter date: 1:00 AM 08/22/2024    Source of History   Patient/EMS    Chief Complaint   Pt presents with:   Headache (Reports 8/10 headache and muscle spasms x 1 week. Pt reports to EMS taking ibuprofen and muscle relaxer's with minimal relief. )      History Of Present Illness   Ken Bills is a 34 y.o. female with history of headaches who presents to the ED with intermittent headaches for the past week. She describes pain and pressure which oscillates from side to side. Patient has been taking muscle relaxants and ibuprofen 800 mg without relief. She cannot recall any recent falls or head injury. Patient is not on any blood thinners.  Denies thunderclap onset focal neurologic deficits.  She states that she was seen by her primary care doctor who recently started her on sumatriptan.  She was not filled this medication or started a yet.     This is the extent to the patients complaints today here in the emergency department.  Past History   Review of patient's allergies indicates:  No Known Allergies    No current facility-administered medications on file prior to encounter.     Current Outpatient Medications on File Prior to Encounter   Medication Sig Dispense Refill    albuterol (PROVENTIL/VENTOLIN HFA) 90 mcg/actuation inhaler Inhale 1-2 puffs into the lungs every 4 (four) hours as needed for Wheezing or Shortness of Breath. Rescue 6.7 g 1    cetirizine (ZYRTEC) 10 MG tablet Take 1 tablet (10 mg total) by mouth once daily. 30 tablet 0    docusate sodium (COLACE) 100 MG capsule Take 2 capsules (200 mg total) by mouth 2 (two) times daily. 40 capsule 0    famotidine (PEPCID) 20 MG tablet Take 1 tablet (20 mg total) by mouth 2 (two) times daily. 20 tablet 0    fluticasone propionate (FLONASE) 50  mcg/actuation nasal spray 1 spray (50 mcg total) by Each Nare route 2 (two) times daily as needed for Rhinitis or Allergies. 15 g 0    ibuprofen (ADVIL,MOTRIN) 600 MG tablet Take 1 tablet (600 mg total) by mouth every 6 (six) hours as needed for Pain. 20 tablet 0    ibuprofen (ADVIL,MOTRIN) 800 MG tablet Take 1 tablet (800 mg total) by mouth every 6 (six) hours as needed for Pain. 20 tablet 0    ketorolac (TORADOL) 10 mg tablet Take 1 tablet (10 mg total) by mouth every 6 (six) hours as needed for Pain. 12 tablet 0    LIDOcaine (LIDODERM) 5 % Place 1 patch onto the skin once daily. Remove & Discard patch within 12 hours or as directed by MD 15 patch 0    LIDOcaine (LIDODERM) 5 % Place 1 patch onto the skin once daily. Remove & Discard patch within 12 hours or as directed by MD 15 patch 0    LORazepam (ATIVAN) 1 MG tablet Take 0.5 tablets (0.5 mg total) by mouth every 6 (six) hours as needed for Anxiety. 10 tablet 0    medroxyPROGESTERone (DEPO-PROVERA) 150 mg/mL injection Inject 1 mL (150 mg total) into the muscle every 3 (three) months. for 5 doses 1 mL 4    naproxen (NAPROSYN) 500 MG tablet Take 1 tablet (500 mg total) by mouth 2 (two) times daily as needed (pain). Take with food 30 tablet 0    ondansetron (ZOFRAN-ODT) 4 MG TbDL Take 1 tablet (4 mg total) by mouth every 8 (eight) hours as needed (nausea). 30 tablet 0    polyethylene glycol (GLYCOLAX) 17 gram PwPk Take 17 g by mouth 2 (two) times daily as needed (constipation). 24 packet 0       As per HPI and below:  Past Medical History:   Diagnosis Date    Allergic      Past Surgical History:   Procedure Laterality Date     SECTION N/A 2018    Procedure:  SECTION;  Surgeon: Jesi Iqbal MD;  [facility];  Service: OB/GYN;  Laterality: N/A;    NO PAST SURGERIES         Social History     Socioeconomic History    Marital status: Single   Tobacco Use    Smoking status: Never    Smokeless tobacco: Never   Substance and Sexual  Activity    Alcohol use: Not Currently     Comment: rarely    Drug use: No    Sexual activity: Yes     Partners: Male     Birth control/protection: None     Social Determinants of Health     Financial Resource Strain: Medium Risk (1/25/2023)    Overall Financial Resource Strain (CARDIA)     Difficulty of Paying Living Expenses: Somewhat hard   Food Insecurity: No Food Insecurity (1/25/2023)    Hunger Vital Sign     Worried About Running Out of Food in the Last Year: Never true     Ran Out of Food in the Last Year: Never true   Transportation Needs: No Transportation Needs (1/25/2023)    PRAPARE - Transportation     Lack of Transportation (Medical): No     Lack of Transportation (Non-Medical): No   Stress: Stress Concern Present (1/25/2023)    [place] [employer] - Occupational Stress Questionnaire     Feeling of Stress : Rather much   Housing Stability: Unknown (1/25/2023)    Housing Stability Vital Sign     Unable to Pay for Housing in the Last Year: No     Unstable Housing in the Last Year: No       Family History   Problem Relation Name Age of Onset    Breast cancer Neg Hx      Colon cancer Neg Hx      Ovarian cancer Neg Hx         Physical Exam     Vitals:    08/22/24 0032 08/22/24 0327   BP: 127/80 132/84   Pulse: 72 74   Resp: 14 18   Temp: 99.2 °F (37.3 °C)    TempSrc: Oral    SpO2: 100% 99%     Physical Exam:   Nursing note and vitals reviewed.  Appearance: well appearing, non-toxic female in no acute respiratory distress.  Making purposeful movements.  Speaking in full sentences.  Skin: No obvious rashes seen.  Good turgor.  No abrasions.  No ecchymoses.  Eyes: No conjunctival injection. EOMI, PERRL.  Head: NC/AT  Chest: No increased work of breathing, bilateral chest rise.  Cardiovascular: Regular rate and rhythm.  Normal equal bilateral radial pulses.  Abdomen: Soft.  Not distended.  Nontender.  No guarding.  No rebound. No Masses  Musculoskeletal: No obvious deformities. Neck supple,  full range of motion, no obvious deformity.   No tenderness to palpation of cervical through lumbar spine.  No step-offs or deformities. Good range of motion all joints.  Neurologic: Moves all extremities and carries on conversation. CN- II: PERRL; III/IV/VI: EOMI w/out evidence of nystagmus; V: no deficits appreciated to light touch bilateral face; VII: no facial weakness, no facial asymmetry. Eyebrow raise symmetric. Smile symmetric; IX/X: palate midline, and raises symmetrically; XI: shoulder shrug 5/5 bilaterally; XII: tongue is midline w/out asymmetry. Strength 5/5 to bilateral upper and lower extremities, sensation intact to light touch.  Gait normal.  Mental Status:  Alert and oriented x 3.  Appropriate, conversant.      Results and Medications    Procedures    Labs Reviewed   SARS-COV-2 RNA AMPLIFICATION, QUAL       Result Value    SARS-CoV-2 RNA, Amplification, Qual Negative     POCT URINE PREGNANCY    POC Preg Test, Ur Negative       Acceptable Yes         Imaging Results    None             Medications   sodium chloride 0.9% bolus 1,000 mL 1,000 mL (0 mLs Intravenous Stopped 8/22/24 0257)   prochlorperazine injection Soln 10 mg (10 mg Intravenous Given 8/22/24 0141)   diphenhydrAMINE injection 25 mg (25 mg Intravenous Given 8/22/24 0141)       MDM, Impression and Plan   Previous Records:   I decided to obtain old medical records which is listed here or in ED course:     Differential diagnosis:  Unlikely ICH  Headache  Dehydration  Migraine    Initial Assessment & ED Management:    Urgent evaluation of 34 y.o. female with History as above who presents the ED with headaches x1 week.  Patient's vitals remained stable in the ED.  No current indication for advanced head imaging at this point in time.  Noted reduction in headache with IV migraine cocktail.  COVID negative urine pregnancy negative.  On reassessment patient states that she is feeling better and requesting go home.  Informed she  should fill her sumatriptan prescription and follow up with her PCP in the next 1-2 days.  Care plan addressed with patient and all those present. All questions answered.  Strict return precautions discussed.  Patient was instructed on the correct follow-up time and route.  They voiced verbal understanding and agreement  with the plan and were deemed stable for discharge.     Medical Decision Making  Amount and/or Complexity of Data Reviewed  Labs: ordered.    Risk  Prescription drug management.             Please see ED course for discussion of workup and dispo if not listed above.          Final diagnoses:  [R51.9] Nonintractable headache, unspecified chronicity pattern, unspecified headache type (Primary)        ED Disposition Condition    Discharge Stable          ED Prescriptions    None       Follow-up Information       Follow up With Specialties Details Why Contact Info    Idania Gan Of  Schedule an appointment as soon as possible for a visit in 2 days or the primary care doctor of your choice [address]  [facility] [address]  [phone]                     Scribe Attestation:   Scribe #1: I performed the above scribed service and the documentation accurately describes the services I performed. I attest to the accuracy of the note.    Physician Attestation for Scribe: I, Sage Pak MD , reviewed documentation as scribed in my presence, which is both accurate and complete.        MD Pasha Calabrese Heyward B, MD  08/22/24 1583

## 2024-09-19 ENCOUNTER — HOSPITAL ENCOUNTER (EMERGENCY)
Facility: OTHER | Age: 34
Discharge: HOME | End: 2024-09-20
Attending: INTERNAL MEDICINE
Payer: MEDICAID

## 2024-09-19 DIAGNOSIS — R07.9 CHEST PAIN IN ADULT: ICD-10-CM

## 2024-09-19 DIAGNOSIS — R07.9 CHEST PAIN: ICD-10-CM

## 2024-09-19 LAB
ALBUMIN SERPL BCP-MCNC: 4 G/DL (ref 3.5–5.2)
ALP SERPL-CCNC: 80 U/L (ref 55–135)
ALT SERPL W/O P-5'-P-CCNC: 29 U/L (ref 10–44)
ANION GAP SERPL CALC-SCNC: 8 MMOL/L (ref 8–16)
AST SERPL-CCNC: 18 U/L (ref 10–40)
B-HCG UR QL: NEGATIVE
BASOPHILS # BLD AUTO: 0.03 K/UL (ref 0–0.2)
BASOPHILS NFR BLD: 0.4 % (ref 0–1.9)
BILIRUB SERPL-MCNC: 0.3 MG/DL (ref 0.1–1)
BNP SERPL-MCNC: <10 PG/ML (ref 0–99)
BUN SERPL-MCNC: 16 MG/DL (ref 6–20)
CALCIUM SERPL-MCNC: 9.3 MG/DL (ref 8.7–10.5)
CHLORIDE SERPL-SCNC: 111 MMOL/L (ref 95–110)
CO2 SERPL-SCNC: 23 MMOL/L (ref 23–29)
CREAT SERPL-MCNC: 1.1 MG/DL (ref 0.5–1.4)
CTP QC/QA: YES
DIFFERENTIAL METHOD BLD: ABNORMAL
EOSINOPHIL # BLD AUTO: 0.1 K/UL (ref 0–0.5)
EOSINOPHIL NFR BLD: 0.8 % (ref 0–8)
ERYTHROCYTE [DISTWIDTH] IN BLOOD BY AUTOMATED COUNT: 12.8 % (ref 11.5–14.5)
EST. GFR  (NO RACE VARIABLE): >60 ML/MIN/1.73 M^2
GLUCOSE SERPL-MCNC: 90 MG/DL (ref 70–110)
HCT VFR BLD AUTO: 41.1 % (ref 37–48.5)
HCV AB SERPL QL IA: NEGATIVE
HGB BLD-MCNC: 13.7 G/DL (ref 12–16)
HIV 1+2 AB+HIV1 P24 AG SERPL QL IA: NEGATIVE
IMM GRANULOCYTES # BLD AUTO: 0.01 K/UL (ref 0–0.04)
IMM GRANULOCYTES NFR BLD AUTO: 0.1 % (ref 0–0.5)
LYMPHOCYTES # BLD AUTO: 2.5 K/UL (ref 1–4.8)
LYMPHOCYTES NFR BLD: 33.6 % (ref 18–48)
MCH RBC QN AUTO: 29.5 PG (ref 27–31)
MCHC RBC AUTO-ENTMCNC: 33.3 G/DL (ref 32–36)
MCV RBC AUTO: 89 FL (ref 82–98)
MONOCYTES # BLD AUTO: 0.6 K/UL (ref 0.3–1)
MONOCYTES NFR BLD: 8.1 % (ref 4–15)
NEUTROPHILS # BLD AUTO: 4.3 K/UL (ref 1.8–7.7)
NEUTROPHILS NFR BLD: 57 % (ref 38–73)
NRBC BLD-RTO: 0 /100 WBC
PLATELET # BLD AUTO: 281 K/UL (ref 150–450)
PMV BLD AUTO: 9 FL (ref 9.2–12.9)
POTASSIUM SERPL-SCNC: 4 MMOL/L (ref 3.5–5.1)
PROT SERPL-MCNC: 7.7 G/DL (ref 6–8.4)
RBC # BLD AUTO: 4.64 M/UL (ref 4–5.4)
SODIUM SERPL-SCNC: 142 MMOL/L (ref 136–145)
TROPONIN I SERPL DL<=0.01 NG/ML-MCNC: <0.006 NG/ML (ref 0–0.03)
TROPONIN I SERPL DL<=0.01 NG/ML-MCNC: <0.006 NG/ML (ref 0–0.03)
WBC # BLD AUTO: 7.49 K/UL (ref 3.9–12.7)

## 2024-09-19 PROCEDURE — 63600175 PHARM REV CODE 636 W HCPCS: Performed by: INTERNAL MEDICINE

## 2024-09-19 PROCEDURE — 85025 COMPLETE CBC W/AUTO DIFF WBC: CPT | Performed by: PHYSICIAN ASSISTANT

## 2024-09-19 PROCEDURE — 96374 THER/PROPH/DIAG INJ IV PUSH: CPT

## 2024-09-19 PROCEDURE — 93010 ELECTROCARDIOGRAM REPORT: CPT | Mod: ,,, | Performed by: INTERNAL MEDICINE

## 2024-09-19 PROCEDURE — 96375 TX/PRO/DX INJ NEW DRUG ADDON: CPT

## 2024-09-19 PROCEDURE — 25000003 PHARM REV CODE 250: Performed by: INTERNAL MEDICINE

## 2024-09-19 PROCEDURE — 99285 EMERGENCY DEPT VISIT HI MDM: CPT | Mod: 25

## 2024-09-19 PROCEDURE — 84484 ASSAY OF TROPONIN QUANT: CPT | Mod: 91 | Performed by: PHYSICIAN ASSISTANT

## 2024-09-19 PROCEDURE — 83880 ASSAY OF NATRIURETIC PEPTIDE: CPT | Performed by: PHYSICIAN ASSISTANT

## 2024-09-19 PROCEDURE — 63600175 PHARM REV CODE 636 W HCPCS: Performed by: PHYSICIAN ASSISTANT

## 2024-09-19 PROCEDURE — 87635 SARS-COV-2 COVID-19 AMP PRB: CPT | Performed by: INTERNAL MEDICINE

## 2024-09-19 PROCEDURE — 93005 ELECTROCARDIOGRAM TRACING: CPT

## 2024-09-19 PROCEDURE — 80053 COMPREHEN METABOLIC PANEL: CPT | Performed by: PHYSICIAN ASSISTANT

## 2024-09-19 PROCEDURE — 87389 HIV-1 AG W/HIV-1&-2 AB AG IA: CPT | Performed by: EMERGENCY MEDICINE

## 2024-09-19 PROCEDURE — 86803 HEPATITIS C AB TEST: CPT | Performed by: EMERGENCY MEDICINE

## 2024-09-19 PROCEDURE — 81025 URINE PREGNANCY TEST: CPT | Performed by: INTERNAL MEDICINE

## 2024-09-19 RX ORDER — ASPIRIN 325 MG
325 TABLET ORAL
Status: COMPLETED | OUTPATIENT
Start: 2024-09-19 | End: 2024-09-19

## 2024-09-19 RX ORDER — ONDANSETRON HYDROCHLORIDE 2 MG/ML
4 INJECTION, SOLUTION INTRAVENOUS
Status: COMPLETED | OUTPATIENT
Start: 2024-09-19 | End: 2024-09-19

## 2024-09-19 RX ORDER — KETOROLAC TROMETHAMINE 30 MG/ML
10 INJECTION, SOLUTION INTRAMUSCULAR; INTRAVENOUS
Status: COMPLETED | OUTPATIENT
Start: 2024-09-19 | End: 2024-09-19

## 2024-09-19 RX ADMIN — ONDANSETRON 4 MG: 2 INJECTION INTRAMUSCULAR; INTRAVENOUS at 11:09

## 2024-09-19 RX ADMIN — ASPIRIN 325 MG ORAL TABLET 325 MG: 325 PILL ORAL at 11:09

## 2024-09-19 RX ADMIN — KETOROLAC TROMETHAMINE 10 MG: 30 INJECTION, SOLUTION INTRAMUSCULAR; INTRAVENOUS at 11:09

## 2024-09-19 NOTE — Clinical Note
"Ken Bills (Jovanda) was seen and treated in our emergency department on 9/19/2024.  She may return to work on 09/22/2024.       If you have any questions or concerns, please don't hesitate to call.       RN    "

## 2024-09-19 NOTE — Clinical Note
"Ken Kumar" Sanju was seen and treated in our emergency department on 9/19/2024.  She may return to work on 09/22/2024.       If you have any questions or concerns, please don't hesitate to call.       LPN    "

## 2024-09-20 VITALS
HEART RATE: 70 BPM | RESPIRATION RATE: 15 BRPM | DIASTOLIC BLOOD PRESSURE: 80 MMHG | SYSTOLIC BLOOD PRESSURE: 128 MMHG | OXYGEN SATURATION: 95 % | WEIGHT: 170 LBS | TEMPERATURE: 99 F | BODY MASS INDEX: 27.32 KG/M2 | HEIGHT: 66 IN

## 2024-09-20 LAB
CTP QC/QA: YES
CTP QC/QA: YES
POC MOLECULAR INFLUENZA A AGN: NEGATIVE
POC MOLECULAR INFLUENZA B AGN: NEGATIVE
SARS-COV-2 RDRP RESP QL NAA+PROBE: NEGATIVE

## 2024-09-20 NOTE — ED PROVIDER NOTES
Encounter date: 11:43 PM 09/20/2024    Source of History   Patient's    Chief Complaint   Pt presents with:   Chest Pain (Chest pain and shob since 0900.)      History Of Present Illness   Ken Bills is a 34 y.o. female with history as below who presents to the ED with chest pain and shortness of breath since 9:00 a.m..  Patient states that she was had chest tightness, she notes intermittent shortness of breath.  She states that she does not feel that she has been wheezing.  She states that she feels that she has been anxious.  She denies cough, blood loss, nausea, vomiting, diarrhea, abdominal pain or fever    This is the extent to the patients complaints today here in the emergency department.  Past History   Review of patient's allergies indicates:  No Known Allergies    No current facility-administered medications on file prior to encounter.     Current Outpatient Medications on File Prior to Encounter   Medication Sig Dispense Refill    albuterol (PROVENTIL/VENTOLIN HFA) 90 mcg/actuation inhaler Inhale 1-2 puffs into the lungs every 4 (four) hours as needed for Wheezing or Shortness of Breath. Rescue 6.7 g 1    cetirizine (ZYRTEC) 10 MG tablet Take 1 tablet (10 mg total) by mouth once daily. 30 tablet 0    docusate sodium (COLACE) 100 MG capsule Take 2 capsules (200 mg total) by mouth 2 (two) times daily. 40 capsule 0    famotidine (PEPCID) 20 MG tablet Take 1 tablet (20 mg total) by mouth 2 (two) times daily. 20 tablet 0    fluticasone propionate (FLONASE) 50 mcg/actuation nasal spray 1 spray (50 mcg total) by Each Nare route 2 (two) times daily as needed for Rhinitis or Allergies. 15 g 0    ibuprofen (ADVIL,MOTRIN) 600 MG tablet Take 1 tablet (600 mg total) by mouth every 6 (six) hours as needed for Pain. 20 tablet 0    ibuprofen (ADVIL,MOTRIN) 800 MG tablet Take 1 tablet (800 mg total) by mouth every 6 (six) hours as needed for Pain. 20 tablet 0    ketorolac (TORADOL) 10 mg tablet Take 1 tablet  (10 mg total) by mouth every 6 (six) hours as needed for Pain. 12 tablet 0    LIDOcaine (LIDODERM) 5 % Place 1 patch onto the skin once daily. Remove & Discard patch within 12 hours or as directed by MD 15 patch 0    LIDOcaine (LIDODERM) 5 % Place 1 patch onto the skin once daily. Remove & Discard patch within 12 hours or as directed by MD 15 patch 0    LORazepam (ATIVAN) 1 MG tablet Take 0.5 tablets (0.5 mg total) by mouth every 6 (six) hours as needed for Anxiety. 10 tablet 0    medroxyPROGESTERone (DEPO-PROVERA) 150 mg/mL injection Inject 1 mL (150 mg total) into the muscle every 3 (three) months. for 5 doses 1 mL 4    naproxen (NAPROSYN) 500 MG tablet Take 1 tablet (500 mg total) by mouth 2 (two) times daily as needed (pain). Take with food 30 tablet 0    ondansetron (ZOFRAN-ODT) 4 MG TbDL Take 1 tablet (4 mg total) by mouth every 8 (eight) hours as needed (nausea). 30 tablet 0    polyethylene glycol (GLYCOLAX) 17 gram PwPk Take 17 g by mouth 2 (two) times daily as needed (constipation). 24 packet 0       As per HPI and below:  Past Medical History:   Diagnosis Date    Allergic      Past Surgical History:   Procedure Laterality Date     SECTION N/A 2018    Procedure:  SECTION;  Surgeon: Jesi Iqbal MD;  [facility];  Service: OB/GYN;  Laterality: N/A;    NO PAST SURGERIES         Social History     Socioeconomic History    Marital status: Single   Tobacco Use    Smoking status: Never    Smokeless tobacco: Never   Substance and Sexual Activity    Alcohol use: Not Currently     Comment: rarely    Drug use: No    Sexual activity: Yes     Partners: Male     Birth control/protection: None     Social Determinants of Health     Financial Resource Strain: Medium Risk (2023)    Overall Financial Resource Strain (CARDIA)     Difficulty of Paying Living Expenses: Somewhat hard   Food Insecurity: No Food Insecurity (2023)    Hunger Vital Sign     Worried About Running Out of  "Food in the Last Year: Never true     Ran Out of Food in the Last Year: Never true   Transportation Needs: No Transportation Needs (1/25/2023)    PRAPARE - Transportation     Lack of Transportation (Medical): No     Lack of Transportation (Non-Medical): No   Stress: Stress Concern Present (1/25/2023)    [employer] [place] [employer] - Occupational Stress Questionnaire     Feeling of Stress : Rather much   Housing Stability: Unknown (1/25/2023)    Housing Stability Vital Sign     Unable to Pay for Housing in the Last Year: No     Unstable Housing in the Last Year: No       Family History   Problem Relation Name Age of Onset    Breast cancer Neg Hx      Colon cancer Neg Hx      Ovarian cancer Neg Hx         Physical Exam     Vitals:    09/19/24 2024 09/20/24 0009 09/20/24 0028   BP: 125/74  128/80   BP Location:   Left arm   Patient Position:   Sitting   Pulse: 79 (S) 70    Resp: 14  15   Temp: 98.2 °F (36.8 °C)  98.5 °F (36.9 °C)   TempSrc:   Oral   SpO2: 97% (S) 95%    Weight: 77.1 kg (169 lb 15.6 oz)     Height: 5' 6" (1.676 m)       Physical Exam:   Nursing note and vitals reviewed.  Appearance:  Well-appearing nontoxic female in no acute respiratory distress.  Making purposeful movements.  Speaking in full sentences.  Skin: No obvious rashes seen.  Good turgor.  No abrasions.  No ecchymoses.  Eyes: No conjunctival injection. EOMI, PERRL.  Head: NC/AT  Chest: CTAB. No increased work of breathing, bilateral chest rise.  No wheezes no adventitious sounds.  Cardiovascular: Regular rate and rhythm.  Normal equal bilateral radial pulses.  Abdomen: Soft.  Not distended.  Nontender.  No guarding.  No rebound. No Masses  Musculoskeletal: No obvious deformities.   Neck supple, full range of motion, no obvious deformity.   No tenderness to palpation of cervical through lumbar spine.  No step-offs or deformities. Good range of motion all joints.  Neurologic: Moves all extremities.  Normal sensation.  No facial " droop.  Normal speech.    Mental Status:  Alert and oriented x 3.  Appropriate, conversant.      Results and Medications    Procedures    Labs Reviewed   CBC W/ AUTO DIFFERENTIAL - Abnormal       Result Value    WBC 7.49      RBC 4.64      Hemoglobin 13.7      Hematocrit 41.1      MCV 89      MCH 29.5      MCHC 33.3      RDW 12.8      Platelets 281      MPV 9.0 (*)     Immature Granulocytes 0.1      Gran # (ANC) 4.3      Immature Grans (Abs) 0.01      Lymph # 2.5      Mono # 0.6      Eos # 0.1      Baso # 0.03      nRBC 0      Gran % 57.0      Lymph % 33.6      Mono % 8.1      Eosinophil % 0.8      Basophil % 0.4      Differential Method Automated      Narrative:     Release to patient->Immediate   COMPREHENSIVE METABOLIC PANEL - Abnormal    Sodium 142      Potassium 4.0      Chloride 111 (*)     CO2 23      Glucose 90      BUN 16      Creatinine 1.1      Calcium 9.3      Total Protein 7.7      Albumin 4.0      Total Bilirubin 0.3      Alkaline Phosphatase 80      AST 18      ALT 29      eGFR >60      Anion Gap 8      Narrative:     Release to patient->Immediate   HIV 1 / 2 ANTIBODY    HIV 1/2 Ag/Ab Negative      Narrative:     Release to patient->Immediate   HEPATITIS C ANTIBODY    Hepatitis C Ab Negative      Narrative:     Release to patient->Immediate   TROPONIN I    Troponin I <0.006      Narrative:     Release to patient->Immediate   B-TYPE NATRIURETIC PEPTIDE    BNP <10      Narrative:     Release to patient->Immediate   TROPONIN I    Troponin I <0.006     POCT URINE PREGNANCY    POC Preg Test, Ur Negative       Acceptable Yes     SARS-COV-2 RDRP GENE    POC Rapid COVID Negative       Acceptable Yes     POCT INFLUENZA A/B MOLECULAR    POC Molecular Influenza A Ag Negative      POC Molecular Influenza B Ag Negative       Acceptable Yes         Imaging Results              X-Ray Chest AP Portable (Final result)  Result time 09/19/24 21:30:21      Final result by  Oskar Rucker MD (09/19/24 21:30:21)                   Impression:      No acute cardiopulmonary process identified.      Electronically signed by: Oskar Rucker MD  Date:    09/19/2024  Time:    21:30               Narrative:    EXAMINATION:  XR CHEST AP PORTABLE    CLINICAL HISTORY:  Chest Pain;    TECHNIQUE:  Single frontal view of the chest was performed.    COMPARISON:  August 2023.    FINDINGS:  Cardiac silhouette is normal in size.  Lungs are symmetrically expanded.  No evidence of focal consolidative process, pneumothorax, or significant pleural effusion.  No acute osseous abnormality identified.                                          Medications   ondansetron injection 4 mg (4 mg Intravenous Given 9/19/24 2308)   ketorolac injection 9.999 mg (9.999 mg Intravenous Given 9/19/24 2346)   aspirin tablet 325 mg (325 mg Oral Given 9/19/24 2345)       MDM, Impression and Plan   Independently Interpreted Test(s):   EKG:  I independently reviewed and interpreted the EKG and my findings are as follows:   Detailed here or in ED course.   IMAGING:  I have ordered and independently interpreted X-rays and my findings are as follow:  Detailed here or in ED course. CXR shows no pneumothorax, pneumonia or pleural effusions.    Clinical Tests:   Lab Tests: Ordered and Reviewed  Radiological Study: Ordered and Reviewed  Medical Tests: Ordered and Reviewed    Differential diagnosis:  -unlikely ACS   -unlikely pneumonia   -unlikely CHF   -unlikely asthma exacerbation   -unlikely pneumothorax    Initial Assessment & ED Management:    Urgent evaluation of 34 y.o. female with with history as below who presents to the ED with chest pain and shortness of breath since 9:00 a.m..  Vitals remained stable in the ED.  No desaturation ambulatory pulse ox monitoring COVID and flu negative.  EKG and troponin x2 making ACS unlikely presentation not consistent with CHF.  CBC and CMP grossly unremarkable.  Chest x-ray with no  pneumonia.  She noted reduction in her chest tightness with Zofran Toradol and aspirin.  PERC negative.  Low risk for ACS based off heart score.  Symptoms controlled.  Instructed the patient to follow up with PCP.  Care plan addressed with patient and all those present. All questions answered.  Strict return precautions discussed.  Patient was instructed on the correct follow-up time and route.  They voiced verbal understanding and agreement  with the plan and were deemed stable for discharge.           Please see ED course for discussion of workup and dispo if not listed above.   ED Course as of 09/20/24 0153   Thu Sep 19, 2024   2303 EKG shows normal sinus rhythm with ventricular rate of 72 beats per minute.  Narrow QRS.  No ST segment elevations depressions no STEMI.  Similar to previous EKGs.  []   Fri Sep 20, 2024   0039 HEART Score for Major Cardiac Events from [employer]  on 9/20/2024  ** All calculations should be rechecked by clinician prior to use **    RESULT SUMMARY:  2 points  Low Score (0-3 points)    Risk of MACE of 0.9-1.7%.      INPUTS:  History -> 0 = Slightly suspicious  EKG -> 1 = Non-specific repolarization disturbance  Age -> 0 = <45  Risk factors -> 1 = 1-2 risk factors  Initial troponin -> 0 = =normal limit   []   0151 PERC Rule for Pulmonary Embolism from [employer]  on 9/20/2024  ** All calculations should be rechecked by clinician prior to use **    RESULT SUMMARY:  0 criteria  No need for further workup, as <2% chance of PE.    If no criteria are positive and clinician's pre-test probability is <15%, PERC Rule criteria are satisfied.      INPUTS:  Age =50 -> 0 = No  HR =100 -> 0 = No  O2 sat on room air <95% -> 0 = No  Unilateral leg swelling -> 0 = No  Hemoptysis -> 0 = No  Recent surgery or trauma -> 0 = No  Prior PE or DVT -> 0 = No  Hormone use -> 0 = No   []      ED Course User Index  [HM] Sage Pak MD     Medical Decision Making  Amount and/or Complexity of Data  Reviewed  Labs: ordered.    Risk  OTC drugs.  Prescription drug management.                            Final diagnoses:  [R07.9] Chest pain in adult  [R07.9] Chest pain        ED Disposition Condition    Discharge Stable          ED Prescriptions    None       Follow-up Information       Follow up With Specialties Details Why Contact Info    Emily Gan  Schedule an appointment as soon as possible for a visit in 2 days or the primary care doctor of your choice [address] [facility] [address]  [phone]                         MD Pasha Calabrese Heyward B, MD  09/20/24 0153

## 2024-09-20 NOTE — DISCHARGE INSTRUCTIONS
Diagnosis:   1. Chest pain in adult    2. Chest pain        Tests you had showed:   Labs Reviewed   CBC W/ AUTO DIFFERENTIAL - Abnormal       Result Value    WBC 7.49      RBC 4.64      Hemoglobin 13.7      Hematocrit 41.1      MCV 89      MCH 29.5      MCHC 33.3      RDW 12.8      Platelets 281      MPV 9.0 (*)     Immature Granulocytes 0.1      Gran # (ANC) 4.3      Immature Grans (Abs) 0.01      Lymph # 2.5      Mono # 0.6      Eos # 0.1      Baso # 0.03      nRBC 0      Gran % 57.0      Lymph % 33.6      Mono % 8.1      Eosinophil % 0.8      Basophil % 0.4      Differential Method Automated      Narrative:     Release to patient->Immediate   COMPREHENSIVE METABOLIC PANEL - Abnormal    Sodium 142      Potassium 4.0      Chloride 111 (*)     CO2 23      Glucose 90      BUN 16      Creatinine 1.1      Calcium 9.3      Total Protein 7.7      Albumin 4.0      Total Bilirubin 0.3      Alkaline Phosphatase 80      AST 18      ALT 29      eGFR >60      Anion Gap 8      Narrative:     Release to patient->Immediate   HIV 1 / 2 ANTIBODY    HIV 1/2 Ag/Ab Negative      Narrative:     Release to patient->Immediate   HEPATITIS C ANTIBODY    Hepatitis C Ab Negative      Narrative:     Release to patient->Immediate   TROPONIN I    Troponin I <0.006      Narrative:     Release to patient->Immediate   B-TYPE NATRIURETIC PEPTIDE    BNP <10      Narrative:     Release to patient->Immediate   TROPONIN I    Troponin I <0.006     POCT URINE PREGNANCY    POC Preg Test, Ur Negative       Acceptable Yes     SARS-COV-2 RDRP GENE    POC Rapid COVID Negative       Acceptable Yes     POCT INFLUENZA A/B MOLECULAR    POC Molecular Influenza A Ag Negative      POC Molecular Influenza B Ag Negative       Acceptable Yes        X-Ray Chest AP Portable   Final Result      No acute cardiopulmonary process identified.         Electronically signed by: Oskar Rucker MD   Date:    09/19/2024   Time:    21:30           Treatments you received were:   Medications   ondansetron injection 4 mg (4 mg Intravenous Given 9/19/24 2301)   ketorolac injection 9.999 mg (9.999 mg Intravenous Given 9/19/24 2346)   aspirin tablet 325 mg (325 mg Oral Given 9/19/24 2345)       Home Care Instructions:  - Medications: Continue taking your home medications as prescribed  -you can take Motrin Tylenol for your chest pain.    Follow-Up Plan:  - Follow-up with: Primary care doctor within 1-2  days  - Additional testing and/or evaluation will be directed by your primary doctor    Return to the Emergency Department for symptoms including but not limited to: worsening symptoms, severe back pain, shortness of breath or chest pain, vomiting with inability to hold down fluids, blood in vomit or poop, fevers greater than 100.4°F, passing out/fainting/unconsciousness, or other concerning symptoms.     No future appointments.

## 2024-09-20 NOTE — ED TRIAGE NOTES
Patient reports chronic pain s/t fibromyalgia for 2 years. This morning began with pressure in chest with SOB. States pain is intermittent and feels like a pulling sensation in chest. + nausea with no vomiting. Reports increased anxiety as well. Taking home meds without relief. No h/o injury. Presents in no distress.

## 2024-09-20 NOTE — FIRST PROVIDER EVALUATION
Emergency Department TeleTriage Encounter Note      CHIEF COMPLAINT    Chief Complaint   Patient presents with    Chest Pain     Chest pain and shob since 0900.       VITAL SIGNS   Initial Vitals [09/19/24 2024]   BP Pulse Resp Temp SpO2   125/74 79 14 98.2 °F (36.8 °C) 97 %      MAP       --            ALLERGIES    Review of patient's allergies indicates:  No Known Allergies    PROVIDER TRIAGE NOTE  Patient presents with constant chest pain, muscles aches and shortness of breath. No cough. + nausea.       ORDERS  Labs Reviewed   HIV 1 / 2 ANTIBODY   HEPATITIS C ANTIBODY       ED Orders (720h ago, onward)      Start Ordered     Status Ordering Provider    09/19/24 2026 09/19/24 2026  HIV 1/2 Ag/Ab (4th Gen)  STAT         Ordered MODE DANIELS    09/19/24 2026 09/19/24 2026  Hepatitis C Antibody  STAT         Ordered MODE DANIELS    09/19/24 2026 09/19/24 2026  EKG 12-lead  Once         Completed by KATHY AVILA on 9/19/2024 at  8:33 PM MODE DANIELS              Virtual Visit Note: The provider triage portion of this emergency department evaluation and documentation was performed via "[employer] a HIPAA-compliant telemedicine application, in concert with a tele-presenter in the room. A face to face patient evaluation with one of my colleagues will occur once the patient is placed in an emergency department room.      DISCLAIMER: This note was prepared with [employer] voice recognition transcription software. Garbled syntax, mangled pronouns, and other bizarre constructions may be attributed to that software system.

## 2024-09-21 LAB
OHS QRS DURATION: 82 MS
OHS QTC CALCULATION: 400 MS